# Patient Record
Sex: MALE | Race: WHITE | NOT HISPANIC OR LATINO | Employment: OTHER | URBAN - METROPOLITAN AREA
[De-identification: names, ages, dates, MRNs, and addresses within clinical notes are randomized per-mention and may not be internally consistent; named-entity substitution may affect disease eponyms.]

---

## 2023-08-23 PROBLEM — N50.89 TESTICULAR MASS: Status: ACTIVE | Noted: 2023-08-23

## 2023-08-23 PROBLEM — D48.5 NEOPLASM OF UNCERTAIN BEHAVIOR OF SKIN: Status: ACTIVE | Noted: 2017-01-25

## 2023-08-23 PROBLEM — G47.9 SLEEP DISTURBANCES: Status: ACTIVE | Noted: 2023-08-23

## 2023-08-23 PROBLEM — N52.9 IMPOTENCE DUE TO ERECTILE DYSFUNCTION: Status: ACTIVE | Noted: 2023-08-23

## 2023-08-23 PROBLEM — K80.10 CHRONIC CHOLECYSTITIS WITH CALCULUS: Status: ACTIVE | Noted: 2023-08-23

## 2023-08-23 PROBLEM — K51.90 ULCERATIVE COLITIS (MULTI): Status: ACTIVE | Noted: 2023-08-23

## 2023-08-23 PROBLEM — L74.511 PRIMARY FOCAL HYPERHIDROSIS, FACE: Status: ACTIVE | Noted: 2017-01-25

## 2023-08-23 PROBLEM — K21.9 GERD (GASTROESOPHAGEAL REFLUX DISEASE): Status: ACTIVE | Noted: 2023-08-23

## 2023-08-23 PROBLEM — G62.9 NEUROPATHY: Status: ACTIVE | Noted: 2023-08-23

## 2023-08-23 PROBLEM — Z96.652 STATUS POST TOTAL LEFT KNEE REPLACEMENT: Status: ACTIVE | Noted: 2023-08-23

## 2023-08-23 PROBLEM — N40.0 BPH WITH ELEVATED PSA: Status: ACTIVE | Noted: 2023-08-23

## 2023-08-23 PROBLEM — M12.812 ROTATOR CUFF ARTHROPATHY OF LEFT SHOULDER: Status: ACTIVE | Noted: 2023-08-23

## 2023-08-23 PROBLEM — N39.41 URGE INCONTINENCE OF URINE: Status: ACTIVE | Noted: 2023-08-23

## 2023-08-23 PROBLEM — G47.00 INSOMNIA: Status: ACTIVE | Noted: 2023-08-23

## 2023-08-23 PROBLEM — E78.00 ELEVATED LDL CHOLESTEROL LEVEL: Status: ACTIVE | Noted: 2023-08-23

## 2023-08-23 PROBLEM — M71.22 BAKER'S CYST, LEFT: Status: ACTIVE | Noted: 2023-08-23

## 2023-08-23 PROBLEM — G25.5 CHOREA: Status: ACTIVE | Noted: 2023-08-23

## 2023-08-23 PROBLEM — F41.8 MIXED ANXIETY DEPRESSIVE DISORDER: Status: ACTIVE | Noted: 2023-08-23

## 2023-08-23 PROBLEM — M96.1 POSTLAMINECTOMY SYNDROME, LUMBAR: Status: ACTIVE | Noted: 2023-08-23

## 2023-08-23 PROBLEM — F41.9 ANXIETY: Status: ACTIVE | Noted: 2023-08-23

## 2023-08-23 PROBLEM — E78.2 MIXED HYPERLIPIDEMIA: Status: ACTIVE | Noted: 2023-08-23

## 2023-08-23 PROBLEM — E03.9 HYPOTHYROIDISM: Status: ACTIVE | Noted: 2023-08-23

## 2023-08-23 PROBLEM — M17.9 KNEE OSTEOARTHRITIS: Status: ACTIVE | Noted: 2023-08-23

## 2023-08-23 PROBLEM — G25.9 MOVEMENT DISORDER: Status: ACTIVE | Noted: 2023-08-23

## 2023-08-23 PROBLEM — R97.20 BPH WITH ELEVATED PSA: Status: ACTIVE | Noted: 2023-08-23

## 2023-08-23 PROBLEM — E55.9 VITAMIN D DEFICIENCY: Status: ACTIVE | Noted: 2023-08-23

## 2023-08-23 PROBLEM — F17.210 LIGHT CIGARETTE SMOKER: Status: ACTIVE | Noted: 2023-08-23

## 2023-08-23 PROBLEM — M65.331 TRIGGER MIDDLE FINGER OF RIGHT HAND: Status: ACTIVE | Noted: 2023-08-23

## 2023-08-23 PROBLEM — M79.7 FIBROMYALGIA: Status: ACTIVE | Noted: 2023-08-23

## 2023-08-23 PROBLEM — M81.0 OSTEOPOROSIS: Status: ACTIVE | Noted: 2023-08-23

## 2023-08-23 PROBLEM — E78.00 PURE HYPERCHOLESTEROLEMIA: Status: ACTIVE | Noted: 2023-08-23

## 2023-08-23 PROBLEM — N40.0 ENLARGED PROSTATE: Status: ACTIVE | Noted: 2023-08-23

## 2023-08-23 PROBLEM — L73.8 OTHER SPECIFIED FOLLICULAR DISORDERS: Status: ACTIVE | Noted: 2017-01-25

## 2023-08-23 PROBLEM — R31.9 HEMATURIA: Status: ACTIVE | Noted: 2023-08-23

## 2023-08-23 PROBLEM — R91.1 LUNG NODULE: Status: ACTIVE | Noted: 2023-08-23

## 2023-08-23 RX ORDER — ATORVASTATIN CALCIUM 40 MG/1
1 TABLET, FILM COATED ORAL DAILY
COMMUNITY
Start: 2018-03-28 | End: 2023-10-25 | Stop reason: ALTCHOICE

## 2023-08-23 RX ORDER — CEPHALEXIN 500 MG/1
1 TABLET ORAL 3 TIMES DAILY
COMMUNITY
Start: 2020-11-27 | End: 2023-10-25 | Stop reason: ALTCHOICE

## 2023-08-23 RX ORDER — ATORVASTATIN CALCIUM 20 MG/1
1 TABLET, FILM COATED ORAL DAILY
COMMUNITY
End: 2023-10-25 | Stop reason: SDUPTHER

## 2023-08-23 RX ORDER — IBUPROFEN 800 MG/1
TABLET ORAL
COMMUNITY
End: 2023-10-25 | Stop reason: ALTCHOICE

## 2023-08-23 RX ORDER — DOXYCYCLINE 100 MG/1
1 CAPSULE ORAL 2 TIMES DAILY
COMMUNITY
Start: 2020-11-27 | End: 2023-10-25 | Stop reason: ALTCHOICE

## 2023-08-23 RX ORDER — MUPIROCIN 20 MG/G
1 OINTMENT TOPICAL
COMMUNITY
Start: 2017-01-25 | End: 2023-10-25 | Stop reason: ALTCHOICE

## 2023-08-23 RX ORDER — TOPIRAMATE 100 MG/1
1 TABLET, COATED ORAL 2 TIMES DAILY
COMMUNITY
Start: 2020-10-07 | End: 2023-10-25 | Stop reason: SDUPTHER

## 2023-08-23 RX ORDER — NALOXONE HYDROCHLORIDE 4 MG/.1ML
SPRAY NASAL
COMMUNITY
Start: 2020-09-16 | End: 2023-10-25 | Stop reason: ALTCHOICE

## 2023-08-23 RX ORDER — GABAPENTIN 400 MG/1
CAPSULE ORAL 4 TIMES DAILY
COMMUNITY
Start: 2020-11-09 | End: 2023-10-25 | Stop reason: SDUPTHER

## 2023-08-23 RX ORDER — ESCITALOPRAM OXALATE 5 MG/1
1 TABLET ORAL DAILY
COMMUNITY
Start: 2020-09-17 | End: 2023-10-25 | Stop reason: SDUPTHER

## 2023-08-23 RX ORDER — OXYCODONE HYDROCHLORIDE 5 MG/1
TABLET ORAL
COMMUNITY
Start: 2020-11-27 | End: 2023-10-25 | Stop reason: ALTCHOICE

## 2023-08-23 RX ORDER — OMEPRAZOLE 40 MG/1
1 CAPSULE, DELAYED RELEASE ORAL DAILY
COMMUNITY
Start: 2021-05-17 | End: 2023-10-25 | Stop reason: ALTCHOICE

## 2023-08-23 RX ORDER — TRAZODONE HYDROCHLORIDE 150 MG/1
1 TABLET ORAL NIGHTLY PRN
COMMUNITY
Start: 2016-07-08 | End: 2023-10-25 | Stop reason: SDUPTHER

## 2023-08-23 RX ORDER — HYDROMORPHONE HYDROCHLORIDE 4 MG/1
TABLET ORAL 4 TIMES DAILY PRN
COMMUNITY
Start: 2020-09-16 | End: 2023-10-25 | Stop reason: ALTCHOICE

## 2023-08-23 RX ORDER — ESCITALOPRAM OXALATE 10 MG/1
1 TABLET ORAL DAILY
COMMUNITY
Start: 2020-10-20 | End: 2023-10-25 | Stop reason: ALTCHOICE

## 2023-10-16 ENCOUNTER — APPOINTMENT (OUTPATIENT)
Dept: PRIMARY CARE | Facility: CLINIC | Age: 68
End: 2023-10-16
Payer: MEDICARE

## 2023-10-25 ENCOUNTER — OFFICE VISIT (OUTPATIENT)
Dept: PRIMARY CARE | Facility: CLINIC | Age: 68
End: 2023-10-25
Payer: MEDICARE

## 2023-10-25 VITALS
HEART RATE: 86 BPM | BODY MASS INDEX: 20.73 KG/M2 | WEIGHT: 140 LBS | HEIGHT: 69 IN | TEMPERATURE: 97.6 F | SYSTOLIC BLOOD PRESSURE: 108 MMHG | DIASTOLIC BLOOD PRESSURE: 60 MMHG | OXYGEN SATURATION: 95 %

## 2023-10-25 DIAGNOSIS — Z23 NEED FOR VACCINATION FOR H FLU TYPE B: ICD-10-CM

## 2023-10-25 DIAGNOSIS — G62.9 NEUROPATHY: ICD-10-CM

## 2023-10-25 DIAGNOSIS — Z01.89 ENCOUNTER FOR ROUTINE LABORATORY TESTING: ICD-10-CM

## 2023-10-25 DIAGNOSIS — Z12.5 ENCOUNTER FOR PROSTATE CANCER SCREENING: ICD-10-CM

## 2023-10-25 DIAGNOSIS — F41.8 MIXED ANXIETY DEPRESSIVE DISORDER: ICD-10-CM

## 2023-10-25 DIAGNOSIS — G47.9 SLEEP DISTURBANCES: ICD-10-CM

## 2023-10-25 DIAGNOSIS — G25.5 CHOREA: ICD-10-CM

## 2023-10-25 DIAGNOSIS — E78.2 MIXED HYPERLIPIDEMIA: Primary | ICD-10-CM

## 2023-10-25 DIAGNOSIS — R53.83 OTHER FATIGUE: ICD-10-CM

## 2023-10-25 DIAGNOSIS — M96.1 POSTLAMINECTOMY SYNDROME, LUMBAR: ICD-10-CM

## 2023-10-25 PROBLEM — E78.00 ELEVATED LDL CHOLESTEROL LEVEL: Status: RESOLVED | Noted: 2023-08-23 | Resolved: 2023-10-25

## 2023-10-25 PROCEDURE — 1159F MED LIST DOCD IN RCRD: CPT | Performed by: INTERNAL MEDICINE

## 2023-10-25 PROCEDURE — 99214 OFFICE O/P EST MOD 30 MIN: CPT | Performed by: INTERNAL MEDICINE

## 2023-10-25 PROCEDURE — G0008 ADMIN INFLUENZA VIRUS VAC: HCPCS | Performed by: INTERNAL MEDICINE

## 2023-10-25 PROCEDURE — 90662 IIV NO PRSV INCREASED AG IM: CPT | Performed by: INTERNAL MEDICINE

## 2023-10-25 PROCEDURE — 1125F AMNT PAIN NOTED PAIN PRSNT: CPT | Performed by: INTERNAL MEDICINE

## 2023-10-25 RX ORDER — ATORVASTATIN CALCIUM 20 MG/1
20 TABLET, FILM COATED ORAL DAILY
Qty: 90 TABLET | Refills: 1 | Status: SHIPPED | OUTPATIENT
Start: 2023-10-25 | End: 2024-04-24 | Stop reason: SDUPTHER

## 2023-10-25 RX ORDER — ESCITALOPRAM OXALATE 5 MG/1
5 TABLET ORAL DAILY
Qty: 90 TABLET | Refills: 1 | Status: SHIPPED | OUTPATIENT
Start: 2023-10-25 | End: 2024-04-24 | Stop reason: ALTCHOICE

## 2023-10-25 RX ORDER — TOPIRAMATE 100 MG/1
100 TABLET, COATED ORAL 2 TIMES DAILY
Qty: 90 TABLET | Refills: 1 | Status: SHIPPED | OUTPATIENT
Start: 2023-10-25 | End: 2023-12-06

## 2023-10-25 RX ORDER — GABAPENTIN 400 MG/1
400 CAPSULE ORAL 4 TIMES DAILY
Qty: 360 CAPSULE | Refills: 1 | Status: SHIPPED | OUTPATIENT
Start: 2023-10-25 | End: 2023-11-09 | Stop reason: SDUPTHER

## 2023-10-25 RX ORDER — TRAZODONE HYDROCHLORIDE 150 MG/1
150 TABLET ORAL NIGHTLY PRN
Qty: 90 TABLET | Refills: 1 | Status: SHIPPED | OUTPATIENT
Start: 2023-10-25 | End: 2024-03-22

## 2023-10-25 ASSESSMENT — ENCOUNTER SYMPTOMS
DEPRESSION: 0
OCCASIONAL FEELINGS OF UNSTEADINESS: 1
LOSS OF SENSATION IN FEET: 0

## 2023-10-25 ASSESSMENT — PATIENT HEALTH QUESTIONNAIRE - PHQ9
SUM OF ALL RESPONSES TO PHQ9 QUESTIONS 1 AND 2: 0
2. FEELING DOWN, DEPRESSED OR HOPELESS: NOT AT ALL
1. LITTLE INTEREST OR PLEASURE IN DOING THINGS: NOT AT ALL

## 2023-10-25 ASSESSMENT — PAIN SCALES - GENERAL: PAINLEVEL: 4

## 2023-10-25 NOTE — PROGRESS NOTES
[facility]: [facility]  PROGRESS NOTE      Davi Garsia is a 68 y.o. male that is presenting today for Follow-up.    Assessment/Plan   Diagnoses and all orders for this visit:  Mixed hyperlipidemia    Per most recent BW well controlled  Continue current medication  Rx Escripted   -     atorvastatin (Lipitor) 20 mg tablet; Take 1 tablet (20 mg) by mouth once daily. 90 x 1  -     TSH with reflex to Free T4 if abnormal; Future  -     Lipid Panel; Future  Neuropathy       Under control with current treatment   Continue the same   Rx E-scripted  -     gabapentin (Neurontin) 400 mg capsule; Take 1 capsule (400 mg) by mouth 4 times a day. 90 d x 1  Mixed anxiety depressive disorder     Under control with current treatment   Continue the same   Rx E-scripted  -     escitalopram (Lexapro) 5 mg tablet; Take 1 tablet (5 mg) by mouth once daily. 90 D x 1  -     Topamax 100 mg tablet; Take 1 tablet (100 mg) by mouth 2 times a day.  90 D x 1  Encounter for routine laboratory testing  -     Comprehensive Metabolic Panel; Future  -     CBC; Future  -     Prostate Specific Antigen; Future  -     Lipid Panel; Future  Encounter for prostate cancer screening  -     Prostate Specific Antigen; Future  Sleep disturbances  -     traZODone (Desyrel) 150 mg tablet; Take 1 tablet (150 mg) by mouth as needed at bedtime for sleep.  Other fatigue  -     CBC; Future  Need for vaccination for H flu type B  -     Flu vaccine, quadrivalent, high-dose, preservative free, age 65y+ (FLUZONE)  Chorea        Stable   -     Disability Placard 5 yrs  Postlaminectomy syndrome, lumbar  -     Disability Placard 5 yrs  Subjective   Patient is here for his 6 months FUV been doing fairly well and has no concerns at this time    Review of Systems   All pertinent POSITIVES as noted per HPI.  All other systems have been reviewed and are NEGATIVE and /or Noncontributory to this patient current visit or complaint.   Objective   Vitals:     10/25/23 0931   BP: 108/60   Pulse: 86   Temp: 36.4 °C (97.6 °F)   SpO2: 95%      Body mass index is 20.98 kg/m².  Physical Exam  Vitals and nursing note reviewed.   Constitutional:       Appearance: Normal appearance.   HENT:      Head: Normocephalic and atraumatic.   Neck:      Vascular: No carotid bruit.   Cardiovascular:      Rate and Rhythm: Normal rate and regular rhythm.      Pulses: Normal pulses.      Heart sounds: Normal heart sounds.   Pulmonary:      Effort: Pulmonary effort is normal.      Breath sounds: Normal breath sounds.   Abdominal:      General: Abdomen is flat. Bowel sounds are normal.      Palpations: Abdomen is soft.   Musculoskeletal:         General: No swelling. Normal range of motion.      Cervical back: Neck supple.   Lymphadenopathy:      Cervical: No cervical adenopathy.   Skin:     General: Skin is warm and dry.   Neurological:      Mental Status: He is alert.      Comments: Chorea with involuntary movement   Psychiatric:         Mood and Affect: Mood normal.         Behavior: Behavior normal.       Diagnostic Results   Lab Results   Component Value Date    GLUCOSE 101 (H) 06/26/2023    CALCIUM 9.1 06/26/2023     06/26/2023    K 3.8 06/26/2023    CO2 27 06/26/2023     06/26/2023    BUN 13 06/26/2023    CREATININE 0.9 06/26/2023     Lab Results   Component Value Date    ALT 18 06/26/2023    AST 17 06/26/2023    ALKPHOS 79 06/26/2023    BILITOT 0.2 06/26/2023     Lab Results   Component Value Date    WBC 7.9 06/26/2023    HGB 14.6 06/26/2023    HCT 43.6 06/26/2023    MCV 94.6 06/26/2023     06/26/2023     Lab Results   Component Value Date    CHOL 159 06/26/2023    CHOL 144 08/22/2022    CHOL 163 01/14/2021     Lab Results   Component Value Date    HDL 66 06/26/2023    HDL 57 08/22/2022    HDL 41 01/14/2021     Lab Results   Component Value Date    LDLCALC 77 06/26/2023    LDLCALC 76 08/22/2022    LDLCALC 108 01/14/2021     Lab Results   Component Value Date    TRIG  "80 06/26/2023    TRIG 57 08/22/2022    TRIG 70 01/14/2021     No components found for: \"CHOLHDL\"  Lab Results   Component Value Date    HGBA1C 5.1 06/26/2023     Other labs not included in the list above were reviewed either before or during this encounter.    History    Past Medical History:   Diagnosis Date    Depression     Fibromyalgia     GERD (gastroesophageal reflux disease)     Insomnia     Osteoporosis     Other conditions influencing health status     Arthritis    Personal history of other infectious and parasitic diseases     History of mumps     Past Surgical History:   Procedure Laterality Date    BACK SURGERY      COLONOSCOPY W/ BIOPSIES      PENILE PROSTHESIS IMPLANT      SHOULDER      TOTAL KNEE ARTHROPLASTY       Family History   Problem Relation Name Age of Onset    Dementia Mother      Mental illness Mother      Heart attack Father      Diabetes Father      Other (brain tumor) Father      Heart disease Father      Cancer Maternal Grandfather      Leukemia Other      Pancreatic cancer Other       Social History     Socioeconomic History    Marital status:      Spouse name: Not on file    Number of children: Not on file    Years of education: Not on file    Highest education level: Not on file   Occupational History    Not on file   Tobacco Use    Smoking status: Every Day     Packs/day: .5     Types: Cigarettes     Passive exposure: Current    Smokeless tobacco: Never   Vaping Use    Vaping Use: Never used   Substance and Sexual Activity    Alcohol use: Never    Drug use: Never    Sexual activity: Not on file   Other Topics Concern    Not on file   Social History Narrative    Not on file     Social Determinants of Health     Financial Resource Strain: Not on file   Food Insecurity: Not on file   Transportation Needs: Not on file   Physical Activity: Not on file   Stress: Not on file   Social Connections: Not on file   Intimate Partner Violence: Not on file   Housing Stability: Not on file "     Allergies   Allergen Reactions    Adhesive Tape-Silicones Unknown     Reactions: Blistering    Cephalexin Unknown    Clindamycin Unknown    Linezolid Unknown    Adhesive Rash     water blisters    Penicillins Rash and Swelling     chest swelling     Current Outpatient Medications on File Prior to Visit   Medication Sig Dispense Refill    ketamine (Ketalar) 4 mg/mL swish & spit solution Swish and spit 1 time. Swish and swallow prn      [DISCONTINUED] atorvastatin (Lipitor) 20 mg tablet Take 1 tablet (20 mg) by mouth once daily.      [DISCONTINUED] escitalopram (Lexapro) 5 mg tablet Take 1 tablet (5 mg) by mouth once daily.      [DISCONTINUED] gabapentin (Neurontin) 400 mg capsule Take by mouth 4 times a day.      [DISCONTINUED] Topamax 100 mg tablet Take 1 tablet (100 mg) by mouth 2 times a day.      [DISCONTINUED] traZODone (Desyrel) 150 mg tablet Take 1 tablet (150 mg) by mouth as needed at bedtime.      [DISCONTINUED] atorvastatin (Lipitor) 40 mg tablet Take 1 tablet (40 mg) by mouth once daily.      [DISCONTINUED] cephalexin (Keftab) 500 mg tablet Take 1 tablet (500 mg) by mouth 3 times a day.      [DISCONTINUED] diphenhydramine HCl (BENADRYL ORAL) Benadryl      [DISCONTINUED] doxycycline (Vibramycin) 100 mg capsule Take 1 capsule (100 mg) by mouth 2 times a day.      [DISCONTINUED] escitalopram (Lexapro) 10 mg tablet Take 1 tablet (10 mg) by mouth once daily.      [DISCONTINUED] HYDROmorphone (Dilaudid) 4 mg tablet Take by mouth 4 times a day as needed.      [DISCONTINUED] ibuprofen 800 mg tablet Take by mouth. 3 to 4 times a day      [DISCONTINUED] morphine sulfate (MORPHINE ORAL) as directed as directed via IT pump, managed by pain management      [DISCONTINUED] mupirocin (Bactroban) 2 % ointment 1 Application.      [DISCONTINUED] naloxone (Narcan) 4 mg/0.1 mL nasal spray Administer into affected nostril(s). INHALE 0.1ML INTO NOSTRIL FOR ACCIDENTAL OVERDOSE      [DISCONTINUED] omeprazole (PriLOSEC) 40 mg  DR capsule Take 1 capsule (40 mg) by mouth once daily. 30 minutes before morning meal      [DISCONTINUED] oxyCODONE (Roxicodone) 5 mg immediate release tablet Take by mouth.  TAKE 1-2 TABS BY MOUTH EVERY 6 HOURS AS NEEDED FOR PAIN       No current facility-administered medications on file prior to visit.     Immunization History   Administered Date(s) Administered    Flu vaccine, quadrivalent, high-dose, preservative free, age 65y+ (FLUZONE) 12/17/2020, 10/04/2021, 10/25/2022, 10/25/2023    Influenza, High Dose Seasonal, Preservative Free 10/01/2019, 09/07/2020    Influenza, injectable, quadrivalent 10/07/2016    Influenza, seasonal, injectable 09/29/2012, 10/24/2013, 10/09/2015, 09/19/2018    Novel influenza-H1N1-09, preservative-free 01/04/2010    Pfizer COVID-19 vaccine, bivalent, age 12 years and older (30 mcg/0.3 mL) 10/25/2022    Pfizer Purple Cap SARS-CoV-2 03/24/2021, 04/23/2021, 11/11/2021    Pneumococcal conjugate vaccine, 13-valent (PREVNAR 13) 10/07/2016    Pneumococcal conjugate vaccine, 20-valent (PREVNAR 20) 02/15/2023    Pneumococcal polysaccharide vaccine, 23-valent, age 2 years and older (PNEUMOVAX 23) 03/24/2010, 09/04/2018, 07/17/2020    Td vaccine, age 7 years and older (TENIVAC) 07/23/1996    Tdap vaccine, age 7 year and older (BOOSTRIX) 10/01/2019     Patient's medical history was reviewed and updated either before or during this encounter.    Alcira Wooten MD

## 2023-11-06 ENCOUNTER — TELEPHONE (OUTPATIENT)
Dept: PRIMARY CARE | Facility: CLINIC | Age: 68
End: 2023-11-06
Payer: MEDICARE

## 2023-11-06 DIAGNOSIS — G62.9 NEUROPATHY: ICD-10-CM

## 2023-11-06 NOTE — TELEPHONE ENCOUNTER
Patient states that he has been taking gabapentin 400mg 2 tables 4 times a day and you sent 1 tablet 4 times a day and it won't help if he does not take 2 tablets 4 times a day please advise.

## 2023-11-08 NOTE — TELEPHONE ENCOUNTER
Please review, there is no past Rx in [facility], Epic record matches current 1 tab 4xday, med was sent 10/25 with above orders

## 2023-11-09 RX ORDER — GABAPENTIN 400 MG/1
400 CAPSULE ORAL 4 TIMES DAILY
Qty: 360 CAPSULE | Refills: 0 | Status: SHIPPED | OUTPATIENT
Start: 2023-11-09 | End: 2024-01-29 | Stop reason: SDUPTHER

## 2023-11-22 DIAGNOSIS — F41.8 MIXED ANXIETY DEPRESSIVE DISORDER: ICD-10-CM

## 2023-12-04 NOTE — TELEPHONE ENCOUNTER
"Pt called today asking if can send topiramate 100 mg instead of name brand topamax.  Pt would not pay for brand name because too costly.      Pt indicates the Rx for topamax indicates \"no substitution\" which is what prompted this message from pharm.  Please send topiramate.  "

## 2023-12-06 DIAGNOSIS — G25.9 MOVEMENT DISORDER: ICD-10-CM

## 2023-12-06 DIAGNOSIS — G25.5 CHOREA: Primary | ICD-10-CM

## 2023-12-06 RX ORDER — TOPIRAMATE 100 MG/1
100 TABLET, FILM COATED ORAL 2 TIMES DAILY
Qty: 180 TABLET | Refills: 1 | Status: SHIPPED | OUTPATIENT
Start: 2023-12-06 | End: 2024-12-05

## 2023-12-07 RX ORDER — TOPIRAMATE 100 MG/1
100 TABLET, COATED ORAL 2 TIMES DAILY
Refills: 3 | OUTPATIENT
Start: 2023-12-07

## 2023-12-11 DIAGNOSIS — G25.5 CHOREA: ICD-10-CM

## 2023-12-11 DIAGNOSIS — G25.9 MOVEMENT DISORDER: ICD-10-CM

## 2023-12-11 NOTE — TELEPHONE ENCOUNTER
[facility] states they never received the Rx for topiramate 100 mg even though receipt was confirmed by pharm.  Asking if you could resend to [facility] so they can ship out immediately.

## 2023-12-14 RX ORDER — TOPIRAMATE 100 MG/1
100 TABLET, FILM COATED ORAL 2 TIMES DAILY
Qty: 180 TABLET | Refills: 1 | OUTPATIENT
Start: 2023-12-14 | End: 2024-12-13

## 2024-01-26 ENCOUNTER — TELEPHONE (OUTPATIENT)
Dept: PRIMARY CARE | Facility: CLINIC | Age: 69
End: 2024-01-26
Payer: MEDICARE

## 2024-01-28 DIAGNOSIS — G62.9 NEUROPATHY: ICD-10-CM

## 2024-01-28 DIAGNOSIS — G47.9 SLEEP DISTURBANCES: ICD-10-CM

## 2024-01-29 RX ORDER — GABAPENTIN 400 MG/1
800 CAPSULE ORAL 4 TIMES DAILY
Qty: 360 CAPSULE | Refills: 0
Start: 2024-01-29 | End: 2024-03-11

## 2024-02-09 ENCOUNTER — OFFICE VISIT (OUTPATIENT)
Dept: BEHAVIORAL HEALTH | Facility: CLINIC | Age: 69
End: 2024-02-09
Payer: MEDICARE

## 2024-02-09 ENCOUNTER — OFFICE VISIT (OUTPATIENT)
Dept: NEUROLOGY | Facility: CLINIC | Age: 69
End: 2024-02-09
Payer: MEDICARE

## 2024-02-09 ENCOUNTER — OFFICE VISIT (OUTPATIENT)
Dept: GENETICS | Facility: CLINIC | Age: 69
End: 2024-02-09
Payer: MEDICARE

## 2024-02-09 VITALS — WEIGHT: 129 LBS | RESPIRATION RATE: 16 BRPM | HEIGHT: 68 IN | BODY MASS INDEX: 19.55 KG/M2

## 2024-02-09 DIAGNOSIS — G25.5 CHOREA: Primary | ICD-10-CM

## 2024-02-09 DIAGNOSIS — G25.5 CHOREA: ICD-10-CM

## 2024-02-09 PROCEDURE — 1125F AMNT PAIN NOTED PAIN PRSNT: CPT | Performed by: PSYCHIATRY & NEUROLOGY

## 2024-02-09 PROCEDURE — 99205 OFFICE O/P NEW HI 60 MIN: CPT | Performed by: PSYCHIATRY & NEUROLOGY

## 2024-02-09 PROCEDURE — 99204 OFFICE O/P NEW MOD 45 MIN: CPT | Performed by: PSYCHIATRY & NEUROLOGY

## 2024-02-09 PROCEDURE — 99204 OFFICE O/P NEW MOD 45 MIN: CPT | Performed by: MEDICAL GENETICS

## 2024-02-09 PROCEDURE — 1160F RVW MEDS BY RX/DR IN RCRD: CPT | Performed by: PSYCHIATRY & NEUROLOGY

## 2024-02-09 PROCEDURE — 1159F MED LIST DOCD IN RCRD: CPT | Performed by: PSYCHIATRY & NEUROLOGY

## 2024-02-09 PROCEDURE — 1159F MED LIST DOCD IN RCRD: CPT | Performed by: MEDICAL GENETICS

## 2024-02-09 PROCEDURE — 1160F RVW MEDS BY RX/DR IN RCRD: CPT | Performed by: MEDICAL GENETICS

## 2024-02-09 PROCEDURE — 1125F AMNT PAIN NOTED PAIN PRSNT: CPT | Performed by: MEDICAL GENETICS

## 2024-02-09 ASSESSMENT — ENCOUNTER SYMPTOMS
DEPRESSION: 0
OCCASIONAL FEELINGS OF UNSTEADINESS: 1
LOSS OF SENSATION IN FEET: 0

## 2024-02-09 ASSESSMENT — PATIENT HEALTH QUESTIONNAIRE - PHQ9
1. LITTLE INTEREST OR PLEASURE IN DOING THINGS: NOT AT ALL
SUM OF ALL RESPONSES TO PHQ9 QUESTIONS 1 AND 2: 1
2. FEELING DOWN, DEPRESSED OR HOPELESS: SEVERAL DAYS

## 2024-02-09 ASSESSMENT — PAIN SCALES - GENERAL: PAINLEVEL: 6

## 2024-02-09 NOTE — PROGRESS NOTES
"Subjective   Patient ID: Davi Garsia is a 68 y.o. male who presents for a new patient visit.    Accompanied by wife.    HPI  Mr. 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 He is here today in the [place] disease multidisciplinary clinic for genetic evaluation, and also seeing psychiatry and movement disorders neurology.  He is self-referred, although referral was recommended about 5 years ago by Dr. Francisco Coles.     Tongue movement began 3-4 years ago, patient recalls [likely somewhat earlier according to note by Dr. Coles]. Other movement began last year and have been getting worse. He did have a prescription for Ingrezza, but not currently taking medication for this.     He has memory issues. He reports a deficit in short-term memory. If wife tells him something, he will forget in a day or two. If his wife asks him to bring something on his way back to the room, he will forget. If she reminds him that she asked, he will then recall her asking. He has good long-term memory.     He is able to go to the store and shop. He believes he functions well as far as planning. His concentration ability depends on what he is concentrating on. He does not multi-task, per wife (not necessarily a new issue).     Sometimes has problems with swallowing. He has not had a swallow test, but declines one.     He is concerned with his weight loss. He has lost significant weight recently. He believes he lost 3 lbs in 24 hrs.     He has an enlarged prostate.     Dayron has multiple orthopedic issues.  Going to have a shoulder replacement on March 29th.  He has had a knee replacement. Back and lower extremity pain.  Has had two spinal cord stimulator infections. Believes that his orthopedic issues are due to Ciprofloxacin side effects. He was also on steroids for a long time, per patient.     Per neurology and psychiatry, he reports an episode of unintentional opioid " overdose, about 25 years ago, leading to unresponsiveness.  The chorea did not start acutely after that, however.    PMHx:  lumbar postlaminectomy syndrome (status post pump implant 11/27/2020)    Per Dr. Manning's note on 9/6/2020, “Patient experienced and episode of what was described as spasms in the pelvis. Patient blood pressure was elevated at the time. 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  His R hand trigger finger. Has dull constant pain which is mild. Some weakness. Never had a stroke, had a couple of TIAs.”    Per Dr. Coles's note on 4/4/2019:  “A year back he developed involuntary lip smacking and tongue darting movements, he did not seem to be bothered by these movements.This progressively got worse .This was followed by generalized restlessness ,he couldn't sit still had his legs always moving.After almost for last 6-7 months he developed twitching of the face followed by head movements and upper body and arms irregular movements .More recently has developed grunting,groaning sounds.  Short term memory is getting very poor.  No frequent falls.  Gait is affected since surgery of knees in Nov 2018.  He has mild dysphagia.  He has started talking to himself for last couple of weeks per wife but denies hallucinations.  He has been walking with support for last 6 months uses cane ,as a security blanket but it is not very helpful as he often forgets to take it .  He forgets names,forgets why he was in the kitchen,forgets conversation.  No hx of use of dopamine blockers”      PSHx:  Per Dr. Coles's note on 4/4/2019:  “Leftknee surgeries on NOV 28 th,2018.  laminectomy L5,S1. post morphine pump  Had at one point spinal stimulator but had to be removed because of infections.”      Family  History:  No family history of [place] disease or other movement disorders.  Per Dr. Coles's note on 2019 and my additions in brackets:  “Parents  at old age. mother around mid 80s and father around mid [upper] 70s.None of them had any abnormal involuntary movements. [Father did have an MI, but  from a tumor with hydrocephalus, was in a coma for 3-4 months.]  Seven siblings none of them have any any abnormal involuntary movements.  A brother had a possible aneurysmal bleed(had headache and afterwards was crippled per patient).”    Brother (oldest):  in 70s, MI, diabetes.  Brother (youngest):  in 60s, MI, diabetes.  Son (50 yr): No children.   Grandparents: Lived to old age    Social History:    Per Dr. Coles's note on 2019, “Life long smokers since age 16 years smokes 8 cigerrettes /day”    Specialist Evaluations:  Primary Care - Alcira Wooten MD; 10/25/2023:  “Neuropathy: Under control with current treatment    Comments: Chorea with involuntary movement”     Pain Medicine - Darian Wing MD PhD; 12/10/2020:  “65-year-old man with history of lumbar postlaminectomy syndrome and status post infected intrathecal pump that was replaced 3 weeks ago presenting for virtual follow-up. The wounds appear to be healing appropriately without signs of infection. There may be slight dehiscence at the epidermal layer based on visualization remotely through the phone camera on today's virtual visit.”    Neurology - Francisco Coles MD; 2019:  “Davi Garsia 63 year old RH life long male is here for initial evaluation of involuntary movements.He had L5S1 Laminectomy s/p pain pump.On exam he has generalized chorea,dystonia,mild Parkinsonism,unsurpassable head movements,with hypometric saccades and choppy pursuit movements.MOCA score 22/30.  Based on above the two main differential diagnosis is paraneoplastic chorea vs Huntingtons Disease.  We ve ordered thyroid functions tests and  "antibodies;AWILDA;CMP,Copper,Ceruloplasmin and CBC with acanthocytes and Encephalopathy Paraneoplastic panel.We ve discussed with him that the next step would be the Neurogeneticist consult.We discussed the various FDA approved treatment options for chorea and handed him the information leaflets for tetrabenazine,Austedo and Ingrezza. RTC in 4 weeks.”    Imaging:    \"MRI BRAIN W/WO CONTRAST; 4/11/2019 10:25 am     IMPRESSION:  There is moderate brain parenchymal volume loss.     There are nonspecific white matter changes within the cerebral  hemispheres bilaterally as well as ill-defined foci of increased  signal on the FLAIR and T2 weighted images within the brainstem which  while nonspecific, given the patient's age, likely represent sequelae  of more remote small vessel ischemic change. Additional small foci of  bright signal on the T2 weighted images are noted within the  subinsular regions, basal ganglia, and thalami bilaterally suggesting  incidental mildly prominent perivascular spaces and/or small  scattered more remote lacunar infarctions.\"         Assessment/Plan     It was a pleasure to meet you today.     We reviewed some of the following information about [place] disease today.    Benny disease (HD) is an inherited progressive neurologic condition that affects the brain. HD causes a variety of symptoms: movement, cognitive, and psychiatric. People with HD develop uncontrolled movements (chorea/movement), loss of thinking ability (cognition), and emotional problems such as depression (psychiatric).    HD most commonly affects adults. Symptoms of HD typically develop in a person's 30s or 40s. Early signs and symptoms can include irritability, depression, small involuntary movements, poor coordination, and trouble learning new information or making decisions. Many people with [place] disease develop involuntary jerking or twitching movements known as chorea. As the disease progresses, these " movements become more pronounced. Affected individuals may have trouble walking, speaking, and swallowing. People with this disorder also experience changes in personality and a decline in thinking and reasoning abilities. Individuals with the adult-onset form of Benny disease usually live about 15 to 20 years after signs and symptoms begin.    We reviewed the inheritance of HD. We have around 20,000 or so genes. Our genes come in pairs (we get one copy of each gene from each of our biological parents). HD is an inherited genetic condition. This means it is caused by a faulty or mutated gene. These mutations are usually inherited from a parent. If a parent has HD, there is a 50% chance that that person's children will also inherit the gene mutation that causes HD.    The genetic change that leads to HD is called an expansion.  At the start of the HD gene, there is a repeated sequence of the DNA bases CAG. They are arranged like this:    CAG--CAG--CAG--CAG--CAG--CAG--CAG--CAG--and so on    Most people have 26 or fewer CAG repeats in both copies of their HD gene. However, in people with HD, there are usually greater than 40 CAG repeats, and sometimes up to 100 of these repeats. The increase in the number of the CAG repeats interferes with the way the HD gene is supposed to work, and eventually leads to HD.    As noted above, when someone who carries the HD mutation has children, each child will have a 50% chance to inherit either the normal copy of the HD gene (with 26 or fewer repeats) OR the HD gene with the expansion. The child will inherit a second copy of the normal HD from the other parent (assuming the other parent does not have HD). This is called autosomal dominant inheritance.  Each child who inherits an HD gene with an expansion will develop HD at some point in his or her life.    If a person inherits an expansion (HD mutation) from their father, the number of repeats may be larger than seen in their  father.  Expansions inherited from a person's mother are usually similar in size to that seen in the mother.  A larger number of repeats usually means an earlier average age of onset.  We cannot predict age of onset reliably just from a person's genetic test report.    Currently, we do not have a cure for HD. Some of symptoms of HD can be managed by medication (lessening the uncontrolled movements, treating depression). A person with HD is often followed by a neurologist and a psychiatrist. There are researchers around the world actively studying HD and working towards treatments for HD.    For information about research opportunities, please see the HDSA website, www.HDSA.org, https://hdsa.org/hd-research/hd-trial-finder/ (or search for HDSA Trial Finder), www.Enroll-HD.org (a natural history study), and www.clinicaltrials.gov.    The [facility] Benny Disease Society of [place] (http://[place].hdsa.org/) and their , Raisa Monet, are a great resource for families that have loved ones with [place] disease. Raisa is the local [facility] chapter  and can be reached at [phone] or [email or web address].     The [facility] offers support groups as well as information about the fast-track Disability process.    It is unclear whether or not you have HD.  On one hand, you have pronounced chorea, which is suggestive.  On the other hand, you have no family history and your caudate (a part of the brain that is often small in people with HD) was not particularly small in 2019, although that was nearly 5 years ago.  It is possible to be the first person in the family with HD, so testing is strongly recommended to rule in or rule out HD.    If your testing is negative, I will discuss with Dr. Roldan whether additional genetic testing for neurodegenerative conditions is recommended for you.    We discussed that HD genetic testing options include self-pay through [employer] for $250, or  sending the sample to [facility] (in-network with your insurance) with or without attempting preauthorization first.  You would like my office to attempt preauthorization.  We will contact your insurance company on Monday to see if preauthorization is possible/necessary for this test.    Plan:  Blood draw planned for Monday at  [facility] if we learn this does not require preauthorization --our office will update you.  Please wait for an update from the genetics office before going to the lab for blood draw.  You noted that it is okay to call your wife (Nirmala) if my office is unable to reach you.  If it does require preauthorization, my office will start this process, but this could take up to several weeks.  We would notify you of the outcome.  If blood is drawn on Monday, we will plan to follow-up in 1 month in the next HD clinic.  Otherwise, we will schedule in-person follow-up for about 3 to 4 weeks after blood is drawn.  Consider future swallow study, particularly if your swallowing worsens.   I will be out of the office from 2/13 through 2/19.      If you have any questions, please call Sharmaine Montero in the [facility] at [phone] option 1 or send me a non-urgent message through [employer].     Nikkie Walter MD  Clinical     Visit Time: 2:55 - 3:42    Documentation Time: 4:29 - 4:32 +3 minutes    Scribe Attestation  By signing my name below, IJustyna , Scribe   attest that this documentation has been prepared under the direction and in the presence of Nikkie Walter MD.

## 2024-02-09 NOTE — PATIENT INSTRUCTIONS
Plan:   - It is okay to proceed with genetic testing from psychiatric standpoint.   - You can continue taking your current medications, including escitalopram and trazodone, as prescribed by your other providers.   - Follow up with me only as needed.   - Contact via [facility] or call ([phone]) in case of any questions or concerns.  - Call [phone] for mental health crisis or suicidal thoughts, or call 911 or go to the nearest emergency room for emergencies.

## 2024-02-09 NOTE — PROGRESS NOTES
Subjective     Davi Garsia is a   68 y.o. 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  Visit type: new patient visit     [facility]    HPI He developed fidgetiness and abnormal movements in his hands and trunk since 5 years ago which is getting worse.He also noticed shaking in his hands.No imbalance and fall.He also complains of short term memory loss.Does not impair his function.He still drives and independent.No VH.No depression or anxiety.he is more irritable for about a year.  He is on trazadone,topiramate and Citaloperam.He has a pain pump.Has a history of opium overdose.He was  never on antipsychotic medication or antiemetic medication.    PMH:He is going to do surgery for Lt shoulder in March 2024 bcz of shoulder tendon rupture.  He had Knee replcement in Lt side 4 years ago  He has ankle Fx when he was 1 YO and his ankle is misshaped.      FH:No FH of neurologic problem.He has 6 siblings,2 passed away form heart attack  He has a 51 YO son is healthy  Social:Fired ceramic in furnaces  Active Smoker  No drug/substance abuse  Does not drink alcohol      Patient Health Questionnaire-2 Score: 1  Brain MRI on 2019: There is moderate brain parenchymal volume loss.     There are nonspecific white matter changes within the cerebral  hemispheres bilaterally as well as ill-defined foci of increased  signal on the FLAIR and T2 weighted images within the brainstem which  while nonspecific, given the patient's age, likely represent sequelae  of more remote small vessel ischemic change. Additional small foci of  bright signal on the T2 weighted images are noted within the  subinsular regions, basal ganglia, and thalami bilaterally suggesting  incidental mildly prominent perivascular spaces and/or small  scattered more remote lacunar infarctions.     The study was interpreted at [facility].      Blood work  on 2019:Copper,Ceruloplasmin,AWILDA.TPO 4 years ago normal       Review of Systems  All other system have been reviewed and are negative for complaint.  Objective   Neurological Exam  Mental Status   Oriented to person, place, time and situation.    Cranial Nerves  CN III, IV, VI: Abnormal extraocular movements: No nystagmus. Hypermetric saccades. Diminished smooth pursuit.  CN V: Facial sensation is normal.  CN VII: Full and symmetric facial movement.  CN VIII: Hearing is normal.  CN IX, X: Palate elevates symmetrically    Motor   Strength is 5/5 in all four extremities except as noted.  Lt upper extremity weakness partly is explained due to Lt tendon rupture in proximal but also distal LUE weakness 4/5 distal,Proximal:2/5    LLE:Proximal:4/5 distal:4/5 partly explained by pain .    Sensory  Light touch abnormality: Pinprick abnormality: Vibration abnormality:   Lt lower extremity decreased sensation in light touch and vibration .    Reflexes                                            Right                      Left  Biceps                                 Tr                         Tr  Patellar                                1+                         1+  Achilles                                1+                         1+  Right Plantar: downgoing  Left Plantar: downgoing    Coordination  Right: Finger-to-nose abnormality:    Gait  Casual gait: Normal pull test.  Choreiform gait.      Physical Exam  Eyes:      Extraocular Movements: EOM normalNo nystagmus.   Neurological:      Deep Tendon Reflexes:      Reflex Scores:       Patellar reflexes are 1+ on the right side and 1+ on the left side.       Achilles reflexes are 1+ on the right side and 1+ on the left side.                     UNIFIED [facility] DISEASE RATING SCALE (UHDRS) MOTOR SECTION:    Ocular Pursuit (horizontal)  0-complete  *1-jerky  2-interrupted/full range  3-incomplete range  4-cannot pursue    Ocular Pursuit  (vertical)  *0-complete  1-jerky  2-interrupted/full range  3-incomplete range  4-cannot pursue    Saccade Initiation (horizontal)  *0-normal  1-increased latency  2-suppressible blinks/head movements to  initiate  3-unsuppressible head movements  4-cannot initiate    Saccade Initiation (vertical)  *0-normal  1-increased latency  2-suppressible blinks/head movements to  initiate  3-unsuppressible head movements  4-cannot initiate    * Saccade Velocity (horizontal)  0-normal  1-mild slowing  2-moderate slowing  3-severely slow, full range  4-incomplete range    * Saccade Velocity (vertical)  *0-normal  1-mild slowing  2-moderate slowing  3-severely slow, full range  4-incomplete range    Dysarthria  0-normal  1-unclear, no need to repeat  2-must repeat  3-mostly incomprehensible  4-mute    Tongue Protrusion  0-normal  1-<10 seconds  2-<5 seconds  *3-cannot fully protrude  4-cannot beyond lips    Finger Taps (right)  0-normal (15/5sec)  1-mild slowing or reduction in amp.  *2-moderately impaired. may have occasional  arrests (7- 10/15sec)  3-severely impaired. Frequent hesitations and  arrests  4-can barely perform    Finger Taps (left)  0-normal (15/5sec)  1-mild slowing or reduction in amp.  2-moderately impaired. may have occasional  arrests (7- 10/15sec)  3-severely impaired. Frequent hesitations and  arrests  4-can barely perform    Pronate/Supinate (right)  0-normal  *1-mild slowing/irregular  2-moderate slowing and irregular  3-severe slowing and irregular  4-cannot perform    Pronate/Supinate (left)  0-normal  1-mild slowing/irregular  2-moderate slowing and irregular  3-severe slowing and irregular  4-cannot perform    Fist-Hand-Palm Sequence  0->4 in 10 seconds without cues  1-<4 in 10 sec. without cues  2->4 in 10 sec. with cues  3-<4 in 10 sec. with cues  *4-cannot perform    Rigidity-arms (right)  *0-absent  1-slight or only with activation  2-mild/moderate  3-severe, full range of motion  4-severe with  limited range    Rigidity-arms (left)  0-absent  1-slight or only with activation  2-mild/moderate  3-severe, full range of motion  4-severe with limited range    Bradykinesia  0-normal  *1-minimally slow  2-mildly but clearly slow  3-moderately slow  4-marked slowing, long delays in initiation    Maximal Dystonia(trunk)  *0-absent  1-slight/intermittent  2-mild/common or moderate/intermittent  3-moderate/common  4-marked/prolonged    Maximal Dystonia(RUE)  *0-absent  1-slight/intermittent  2-mild/common or moderate/intermittent  3-moderate/common  4-marked/prolonged    Maximal Dystonia(LUE)  *0-absent  1-slight/intermittent  2-mild/common or moderate/intermittent  3-moderate/common  4-marked/prolonged    Maximal Dystonia(RLE)  0-absent  1-slight/intermittent  2-mild/common or moderate/intermittent  3-moderate/common  4-marked/prolonged    Maximal Dystonia(LLE)  0-absent  1-slight/intermittent  2-mild/common or moderate/intermittent  3-moderate/common  4-marked/prolonged    Maximal Chorea (Face)  0-absent  1-slight/intermittent  2-mild/common or moderate/intermittent  *3-moderate/common  4-marked/prolonged    Maximal Chorea (GABBIE)  0-absent  1-slight/intermittent  2-mild/common or moderate/intermittent  3-moderate/common  4-marked/prolonged    Maximal Chorea (Trunk)  0-absent  1-slight/intermittent  2-mild/common or moderate/intermittent  3-moderate/common  4-marked/prolonged    Maximal Chorea (RUE)  0-absent  1-slight/intermittent  2-mild/common or moderate/intermittent  3-moderate/common  *4-marked/prolonged    Maximal Chorea (LUE)  0-absent  1-slight/intermittent  2-mild/common or moderate/intermittent  *3-moderate/common  4-marked/prolonged    Maximal Chorea (LLE)  0-absent  1-slight/intermittent  2-mild/common or moderate/intermittent  *3-moderate/common  4-marked/prolonged    Maximal Chorea (RLE)  0-absent  1-slight/intermittent  2-mild/common or  moderate/intermittent  *3-moderate/common  4-marked/prolonged    Gait  0-normal narrow base  *1-wide base, and/or slow  2-wide base, walks with difficulty  3-walks with assistance  4-cannot attempt    Tandem Walking  0-normal for 10 steps  1-1-3 deviations  2->3 deviations  3-cannot complete  4-cannot attempt    Retropulsion  *0-normal  1-recovers spontaneously  2-would fall if not caught  3-falls spontaneously  4-cannot stand                     Assessment/Plan Davi Garsia is a   68 y.o. 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 stated 5 years ago and is getting worse.He also has short term memory sleep started 2 years ago.Examination is remarkable for severe tongue,trunk ad upper extremity chorea,mild bradykinesia in both upper and lower extremities and rigidity in lower extremities.Motor muscle strength exam is limited due to pain in LT upper extremity.He has Brain MRI on 2019 showed moderate brain parenchymal volume loss and small foci of bright signal on the T2 weighted images are noted within the subinsular regions, basal ganglia, and thalami bilaterally suggesting incidental mildly prominent perivascular spaces and/or small  scattered more remote lacunar infarctions.Blood work up for Copper,Ceruloplasmin,AWILDA.TPO 4 years ago were normal.      Plan:  Driving evaluation we highly recommend to refrain driving  Neuromuscular referral  EKG  If EKG normal start deutetrabenazine

## 2024-02-09 NOTE — PATIENT INSTRUCTIONS
It was pleasure to visit you  WE ordered Driving evaluation test.we highly recommend to refrain driving until you do driving test  We put a referral for Neuromuscular specialist  We put a referral for EKG  If EKG come back normal we can start medication called deutetrabenazine for chorea

## 2024-02-09 NOTE — LETTER
"February 9, 2024     Nikkie Matthews MD  [address]   [facility]  [facility] [address]    Patient: Davi Garsia   YOB: 1955   Date of Visit: 2/9/2024       Dear Dr. Nikkie Matthews MD:    Thank you for referring Davi Garsia to me for evaluation. Below are my notes for this consultation.  If you have questions, please do not hesitate to call me. I look forward to following your patient along with you.       Sincerely,     Claudine Velasquez MD      CC: Bridger Roldan MD  ______________________________________________________________________________________    Outpatient Psychiatry Initial Evaluation    Subjective  Mr. Davi Garsia is a 68 y.o. male with a history of chorea, multiple back surgeries, postlaminectomy syndrome. Seen in office today for psychiatric evaluation for clearance for genetic testing for [place] disease.     Seen with wife Nirmala.     Chief complaint:  psychiatric clearance for genetic testing for [place] disease.    History of Presenting Illness:   He says he could not sit still even as a kid and his mother would scold him. He says he has a history of involuntary movements \"for a while\" but they are getting worse. He saw Dr. Francisco Coles in 2019 for involuntary lip smacking and tongue darting movements and had metabolic workup, that was unremarkable and MRI brain that showed small vessel ischemic changes and generalized volume loss.     He says he also lost a lot of weight and this bothers him the most.     He also has cognitive concerns - reports short term memory loss.     He says he wants to know what is going on that is causing these abnormal movements and weight loss. He says that if he tested positive for HD, he knows it can't be cured but hopes he can be treated. He also hopes that he can gain some weight.     He says his mood is \"good.\"   Appetite - good. However, he has been losing weight.   Sleep - \"not good\" " "because of pain. His shoulder has been hurting - has surgery scheduled in March.   Energy - \"pretty good.\"   Concentration - \"depends on what I am doing.\"   Motivation - \"ok\"  Denies any hopelessness or worthlessness.   Denies any death wishes or suicidal thoughts, intent or plans.     Denies excessive worry or panic attacks.     Denies any manic or hypomanic episodes.     Denies any AVH, paranoia or delusions.     Denies any impulsive behaviors.     Denies any obsessive thoughts or compulsive behaviors.     He has a pain pump for back pain; says back pain is not bad.     Family history:   No family history of [place] disease or other neurodegenerative disorders.   Mother  close to age 90.   Father  from a heart attack in his 80s.     Current Medications:  Reviewed on Epic.   On escitalopram 5mg - says it was prescribed by pain management because it helps with pain.   On trazodone 150mg for sleep .      Psychiatric Review Of Systems:  As above.     Medical Review Of Systems:    Pertinent items are noted in HPI.    Past Psychiatric History:  Previous psychiatric treatment and medication trials: saw a provider at [facility] about 13 years ago but only for a few weeks. He says that this was when he was dealing with back pain. He does not remember what prescriptions were prescribed but says they were for sleeping.   Previous psychiatric hospitalizations: no  Previous diagnoses: no  Previous suicide attempts: no.   He had an accidental overdose on opioids about 25 years ago- he took oxycodone, went back to sleep but woke up after an hour and a half, and took a second pill, then went back to sleep, and woke up again after an hour and a half, and took a third one. He says his wife's daughter came home from school early for lunch and found him foaming at the mouth. Was hospitalized at [facility].   History of violence: no.    Substance Abuse History:  Recreational drugs:  used marijuana in the past every so " often - last time about 1 year ago . No other illicit drug use.   Use of alcohol:  no history of heavy drinking or abuse. Does not drink alcohol now.   Use of caffeine:  drinks diet Coke - about 1L a day. May drink coffe occasionally.   Tobacco use:  smokes about 10 cigarettes a day.   Legal consequences of chemical use: no    The following portions of the chart were reviewed this encounter and updated as appropriate:         Personal/Social history:   Raised by: biological parents.   Has six siblings.   Childhood trauma: none.     Education: graduated from high school.  Employment history: Worked for [employer] for 4meee. On disability since 1987 for back issues.     Relationship/marital status:  twice.  to current wife for 21 years.   Children: one son from previous marriage.   Living situation: lives at home with wife.   Safety at home: yes    Legal history: none   history: none    PMH/PSH:  Past Medical History:   Diagnosis Date   • Depression    • Fibromyalgia    • GERD (gastroesophageal reflux disease)    • Insomnia    • Osteoporosis    • Other conditions influencing health status     Arthritis   • Personal history of other infectious and parasitic diseases     History of mumps        Meds  Current Outpatient Medications on File Prior to Visit   Medication Sig Dispense Refill   • atorvastatin (Lipitor) 20 mg tablet Take 1 tablet (20 mg) by mouth once daily. 90 tablet 1   • escitalopram (Lexapro) 5 mg tablet Take 1 tablet (5 mg) by mouth once daily. 90 tablet 1   • gabapentin (Neurontin) 400 mg capsule Take 2 capsules (800 mg) by mouth 4 times a day. 360 capsule 0   • ketamine (Ketalar) 4 mg/mL swish & spit solution Swish and spit 1 time. Swish and swallow prn     • topiramate (Topamax) 100 mg tablet Take 1 tablet (100 mg) by mouth 2 times a day. 180 tablet 1   • traZODone (Desyrel) 150 mg tablet Take 1 tablet (150 mg) by mouth as needed at bedtime for sleep. 90 tablet 1  "    No current facility-administered medications on file prior to visit.        Allergies:   Allergies   Allergen Reactions   • Adhesive Tape-Silicones Unknown     Reactions: Blistering   • Cephalexin Unknown   • Clindamycin Unknown   • Linezolid Unknown   • Adhesive Rash     water blisters   • Penicillins Rash and Swelling     chest swelling       Record Review: moderate       Mental Status Evaluation:  Appearance:  thin; fair hygiene and grooming.   Behavior/Attitude: Cooperative. Pleasant.  Jokes at times.   Motor:  constant whole body and orofacial choreiform movements; seemed less prominent when he was tying shoelaces  Gait: choreiform movements while walking.   Speech: Regular in rate, tone and volume. No pressure.   Mood: \"good\"  Affect:  constricted; no apparent distress.   Thought process:  Fairly goal-directed but detailed, and somewhat circumstantial responses.   Thought content: No paranoia, delusion or ideas of reference elicited. No hallucinations in auditory, visual or other sensory modalities.    Suicidal ideation: denied.   Homicidal ideation: denied.   Insight: Fair  Judgment: Fair  Recent and remote memory:  reported subjective memory deficits but fair recall of recent and remote autobiographical information but some difficulty with details.   Attention/concentration: intact during visit   Language: No aphasia or paraphasic errors during conversation    Fund of knowledge: Average       Assessment/Plan  Assessment:   Mr. Davi Garsia is a 68 y.o. male with a history of chorea, multiple back surgeries, postlaminectomy syndrome. Seen in office today for psychiatric evaluation for clearance for genetic testing for [place] disease.     He reports dealing with depressive symptoms due to physical health issues and seeking psychiatric care for a brief period in 2013. Otherwise, he does not have a significant mood disorder, thought disorder, impulse control disorder or substance use disorder. He " has chronic pain and physical health concerns. He has support from his wife and family, although his wife is struggling with coping with his health issues.     He has been taking escitalopram (but unclear if this was for depression/anxeity) and trazodone for sleep.     Diagnosis:   Chorea    Treatment Plan/Recommendations:   - No psychiatric contraindication to genetic testing for HD.   - Will have  contact them to help with resources for patient and wife.   - Follow up with me only as needed.       Claudine Velasquez MD.

## 2024-02-09 NOTE — LETTER
02/10/24    Alcira Wooten MD  [address] [facility] [address]  [place] [address]      Dear Dr. Alcira Wooten MD,    I am writing to confirm that your patient, Davi Garsia  received care in my office on 02/10/24. I have enclosed a summary of the care provided to Davi for your reference.    Please contact me with any questions you may have regarding the visit.    Sincerely,         Nikkie Walter MD  [address] [facility] [address]  [facility] [address]  [phone]    CC: No Recipients

## 2024-02-09 NOTE — PROGRESS NOTES
"Outpatient Psychiatry Initial Evaluation    Subjective   Mr. Davi Garsia is a 68 y.o. male with a history of chorea, multiple back surgeries, postlaminectomy syndrome. Seen in office today for psychiatric evaluation for clearance for genetic testing for [place] disease.     Seen with wife Nirmala.     Chief complaint:  psychiatric clearance for genetic testing for Benny disease.    History of Presenting Illness:   He says he could not sit still even as a kid and his mother would scold him. He says he has a history of involuntary movements \"for a while\" but they are getting worse. He saw Dr. Francisco Coles in 2019 for involuntary lip smacking and tongue darting movements and had metabolic workup, that was unremarkable and MRI brain that showed small vessel ischemic changes and generalized volume loss.     He says he also lost a lot of weight and this bothers him the most.     He also has cognitive concerns - reports short term memory loss.     He says he wants to know what is going on that is causing these abnormal movements and weight loss. He says that if he tested positive for HD, he knows it can't be cured but hopes he can be treated. He also hopes that he can gain some weight.     He says his mood is \"good.\"   Appetite - good. However, he has been losing weight.   Sleep - \"not good\" because of pain. His shoulder has been hurting - has surgery scheduled in March.   Energy - \"pretty good.\"   Concentration - \"depends on what I am doing.\"   Motivation - \"ok\"  Denies any hopelessness or worthlessness.   Denies any death wishes or suicidal thoughts, intent or plans.     Denies excessive worry or panic attacks.     Denies any manic or hypomanic episodes.     Denies any AVH, paranoia or delusions.     Denies any impulsive behaviors.     Denies any obsessive thoughts or compulsive behaviors.     He has a pain pump for back pain; says back pain is not bad.     Family history:   No family history of " [place] disease or other neurodegenerative disorders.   Mother  close to age 90.   Father  from a heart attack in his 80s.     Current Medications:  Reviewed on Epic.   On escitalopram 5mg - says it was prescribed by pain management because it helps with pain.   On trazodone 150mg for sleep .      Psychiatric Review Of Systems:  As above.     Medical Review Of Systems:    Pertinent items are noted in HPI.    Past Psychiatric History:  Previous psychiatric treatment and medication trials: saw a provider at [facility] about 13 years ago but only for a few weeks. He says that this was when he was dealing with back pain. He does not remember what prescriptions were prescribed but says they were for sleeping.   Previous psychiatric hospitalizations: no  Previous diagnoses: no  Previous suicide attempts: no.   He had an accidental overdose on opioids about 25 years ago- he took oxycodone, went back to sleep but woke up after an hour and a half, and took a second pill, then went back to sleep, and woke up again after an hour and a half, and took a third one. He says his wife's daughter came home from school early for lunch and found him foaming at the mouth. Was hospitalized at [place].   History of violence: no.    Substance Abuse History:  Recreational drugs:  used marijuana in the past every so often - last time about 1 year ago . No other illicit drug use.   Use of alcohol:  no history of heavy drinking or abuse. Does not drink alcohol now.   Use of caffeine:  drinks diet Coke - about 1L a day. May drink coffe occasionally.   Tobacco use:  smokes about 10 cigarettes a day.   Legal consequences of chemical use: no    The following portions of the chart were reviewed this encounter and updated as appropriate:         Personal/Social history:   Raised by: biological parents.   Has six siblings.   Childhood trauma: none.     Education: graduated from high school.  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 On disability since 1987 for back issues.     Relationship/marital status:  twice.  to current wife for 21 years.   Children: one son from previous marriage.   Living situation: lives at home with wife.   Safety at home: yes    Legal history: none   history: none    PMH/PSH:  Past Medical History:   Diagnosis Date    Depression     Fibromyalgia     GERD (gastroesophageal reflux disease)     Insomnia     Osteoporosis     Other conditions influencing health status     Arthritis    Personal history of other infectious and parasitic diseases     History of mumps        Meds  Current Outpatient Medications on File Prior to Visit   Medication Sig Dispense Refill    atorvastatin (Lipitor) 20 mg tablet Take 1 tablet (20 mg) by mouth once daily. 90 tablet 1    escitalopram (Lexapro) 5 mg tablet Take 1 tablet (5 mg) by mouth once daily. 90 tablet 1    gabapentin (Neurontin) 400 mg capsule Take 2 capsules (800 mg) by mouth 4 times a day. 360 capsule 0    ketamine (Ketalar) 4 mg/mL swish & spit solution Swish and spit 1 time. Swish and swallow prn      topiramate (Topamax) 100 mg tablet Take 1 tablet (100 mg) by mouth 2 times a day. 180 tablet 1    traZODone (Desyrel) 150 mg tablet Take 1 tablet (150 mg) by mouth as needed at bedtime for sleep. 90 tablet 1     No current facility-administered medications on file prior to visit.        Allergies:   Allergies   Allergen Reactions    Adhesive Tape-Silicones Unknown     Reactions: Blistering    Cephalexin Unknown    Clindamycin Unknown    Linezolid Unknown    Adhesive Rash     water blisters    Penicillins Rash and Swelling     chest swelling       Record Review: moderate       Mental Status Evaluation:  Appearance:  thin; fair hygiene and grooming.   Behavior/Attitude: Cooperative. Pleasant.  Jokes at times.   Motor:  constant whole body and orofacial choreiform movements; seemed less prominent when he was tying shoelaces  Gait:  "choreiform movements while walking.   Speech: Regular in rate, tone and volume. No pressure.   Mood: \"good\"  Affect:  constricted; no apparent distress.   Thought process:  Fairly goal-directed but detailed, and somewhat circumstantial responses.   Thought content: No paranoia, delusion or ideas of reference elicited. No hallucinations in auditory, visual or other sensory modalities.    Suicidal ideation: denied.   Homicidal ideation: denied.   Insight: Fair  Judgment: Fair  Recent and remote memory:  reported subjective memory deficits but fair recall of recent and remote autobiographical information but some difficulty with details.   Attention/concentration: intact during visit   Language: No aphasia or paraphasic errors during conversation    Fund of knowledge: Average       Assessment/Plan   Assessment:   Mr. Davi Garsia is a 68 y.o. male with a history of chorea, multiple back surgeries, postlaminectomy syndrome. Seen in office today for psychiatric evaluation for clearance for genetic testing for Benny disease.     He reports dealing with depressive symptoms due to physical health issues and seeking psychiatric care for a brief period in 2013. Otherwise, he does not have a significant mood disorder, thought disorder, impulse control disorder or substance use disorder. He has chronic pain and physical health concerns. He has support from his wife and family, although his wife is struggling with coping with his health issues.     He has been taking escitalopram (but unclear if this was for depression/anxeity) and trazodone for sleep.     Diagnosis:   Chorea    Treatment Plan/Recommendations:   - No psychiatric contraindication to genetic testing for HD.   - Will have  contact them to help with resources for patient and wife.   - Follow up with me only as needed.       Claudine Velasquez MD.  "

## 2024-02-12 ENCOUNTER — TELEPHONE (OUTPATIENT)
Dept: GENETICS | Facility: CLINIC | Age: 69
End: 2024-02-12
Payer: MEDICARE

## 2024-02-12 DIAGNOSIS — G25.5 CHOREA: Primary | ICD-10-CM

## 2024-02-12 NOTE — TELEPHONE ENCOUNTER
"----- Message from Sharmaine Montero MA sent at 2/12/2024  2:15 PM EST -----  Regarding: RE: [place] Medicare inquiry  Pt aware and schedule for 3/8  ----- Message -----  From: Nikkie Matthews MD  Sent: 2/12/2024  12:54 PM EST  To: Sharmaine Montero MA  Subject: RE: [place] Medicare inquiry                      Please tell patient it does not require or offer preauth.  It will likely be covered by insurance given this and the fact that he has chorea.  Order is in.  Please schedule him for results in the 3/8 HD clinic if he will have blood drawn this week.  ----- Message -----  From: Sharmaine Montero MA  Sent: 2/12/2024  12:06 PM EST  To: Nikkie Matthews MD  Subject: RE: [place] Medicare antione                      Called insurance. I could only get an automated response. It just asked for CPT code. (It did not ask for diagnosis or where lab is being done). According to the automated response  \"CPT code 77272 does not require prior authorization\"  Sharmaine Montero MA    ----- Message -----  From: Nikkie Matthews MD  Sent: 2/10/2024  10:13 AM EST  To: Sharmaine Montero MA  Subject: [employer] Medicare inquiry                          Sharmaine Pinto,    I don't know if there is a [facility] Monday morning (Gwen will be out), but if so, please delegate this time-sensitive task.  Would you or [facility] be able to call patient's insurance company to ask if this test NEEDS or has OPTIONAL preauthorization?      If it does not need preauth and one cannot be done, he would like to have blood drawn Monday 2/12 (I would place an order).  I told him we would update him either way.    The test:    [facility]    [place] [facility]ation [facility], test code 58393    The [place]PT code is 61187.    The ICD10 code is G25.5 (chorea)    Thanks!          "

## 2024-02-13 ENCOUNTER — LAB (OUTPATIENT)
Dept: LAB | Facility: LAB | Age: 69
End: 2024-02-13
Payer: MEDICARE

## 2024-02-13 DIAGNOSIS — G25.5 CHOREA: ICD-10-CM

## 2024-02-16 ENCOUNTER — TELEPHONE (OUTPATIENT)
Dept: GERIATRIC MEDICINE | Facility: CLINIC | Age: 69
End: 2024-02-16
Payer: MEDICARE

## 2024-02-16 NOTE — TELEPHONE ENCOUNTER
I followed up with pt, per Dr. Roldan. Discussed situation and introduced myself. His father's family was from [place] and so some of the family history is obscure. He sounds as if he is going to go ahead with further testing. Asked about home situation. Says wife gets a little upset that he forgets things. He feels she has some memory issues, as well. Daughter comes around, but the rest of the kids are out of state. It is a second marriage for them. Pt says he and wife are managing their routine at home, despite wife using rollator. She cannot walk well, as she has neuropathy. She does not drive. He feels his driving is OK and they go close by, but we talked about distance not figuring in all the time. People can have accidents right in the neighborhood. He agrees. I gave him the contact #'s for the driving evaluations at [facility] and [facility]. He is going to wait on it due to upcoming shoulder surgery. Won't be driving anyway. I mentioned [facility] as a transportation option in [facility]. [facility] is familiar with it. Also gave him the # to the SW at [facility] and my #, as well. He will follow up as needed.

## 2024-02-19 ENCOUNTER — OFFICE VISIT (OUTPATIENT)
Dept: NEUROLOGY | Facility: CLINIC | Age: 69
End: 2024-02-19
Payer: MEDICARE

## 2024-02-19 DIAGNOSIS — G25.5 CHOREA: ICD-10-CM

## 2024-02-19 DIAGNOSIS — R29.898 LEFT ARM WEAKNESS: Primary | ICD-10-CM

## 2024-02-19 PROCEDURE — 1125F AMNT PAIN NOTED PAIN PRSNT: CPT | Performed by: STUDENT IN AN ORGANIZED HEALTH CARE EDUCATION/TRAINING PROGRAM

## 2024-02-19 PROCEDURE — 1160F RVW MEDS BY RX/DR IN RCRD: CPT | Performed by: STUDENT IN AN ORGANIZED HEALTH CARE EDUCATION/TRAINING PROGRAM

## 2024-02-19 PROCEDURE — 99205 OFFICE O/P NEW HI 60 MIN: CPT | Performed by: STUDENT IN AN ORGANIZED HEALTH CARE EDUCATION/TRAINING PROGRAM

## 2024-02-19 PROCEDURE — 99215 OFFICE O/P EST HI 40 MIN: CPT | Mod: GC | Performed by: STUDENT IN AN ORGANIZED HEALTH CARE EDUCATION/TRAINING PROGRAM

## 2024-02-19 PROCEDURE — 1159F MED LIST DOCD IN RCRD: CPT | Performed by: STUDENT IN AN ORGANIZED HEALTH CARE EDUCATION/TRAINING PROGRAM

## 2024-02-19 NOTE — PROGRESS NOTES
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  He now presents with many months to over a year of left arm weakness proximally and distally which is heavily confounded by severe left shoulder pain which is necessitating a replacement.  He has both weakness most severe in left deltoid and distally in the hand, reduced reflexes bilaterally, and diffuse patchy sensory loss worse over the lateral forearm and the hand, however it should be noted there is some inconsistency.  It is unlikely this is purely musculoskeletal and pain related given the distal weakness and sensory changes.  Differential includes a left cervical radiculopathy and/or brachial plexopathy.  He will need an EMG, however he is undergoing a left shoulder replacement the next couple weeks and he would like to fully recover from the surgery before pursuing the EMG.    Plan:  Problem List Items Addressed This Visit       Chorea    Left arm weakness - Primary    Relevant Orders    EMG & nerve conduction to be done after his surgery and recovery     Valeriy Fernandez MD  Neuromuscular Fellow       History of Present Illness:    Mr. Garsia is a 68 y.o. right handed male who presents for evaluation of left arm weakness. He is accompanied by his wife.     He has a chorea for years. He has \"always been a \" and is being evaluated for chorea and HD by movement disorders.     He also has a lot of musculoskeletal complaints including severe left shoulder pain, which she was planning for left shoulder replacement the end of March.  He notes weakness in the left arm and hand going on for at least 6 months but may be longer than a year.  This is heavily confounded by pain in the " left arm.    In addition, he has neuropathy diagnosis, however unclear when this started or etiology. He describes radicular type symptoms in the legs bilaterally rather than numbness tingling in the feet. He gets pain, throbbing, aching in the legs. Gabapentin 800mg QID which may or may not help. He had a pain pump and seen by Dr Wing from pain medicine. He has lumbar postlaminectomy syndrome after lumbar L5/S1 laminectomy in 1989. Ever since he has had back pain and shooting pains down the leg. He has left shoulder pain.     Family history: no neurological disorders.     Relevant past medical, surgical, family, and social histories, along with ROS was reviewed and pertinent details noted above.     Past Medical History:   Diagnosis Date    Depression     Fibromyalgia     GERD (gastroesophageal reflux disease)     Insomnia     Osteoporosis     Other conditions influencing health status     Arthritis    Personal history of other infectious and parasitic diseases     History of mumps     Past Surgical History:   Procedure Laterality Date    BACK SURGERY      COLONOSCOPY W/ BIOPSIES      PENILE PROSTHESIS IMPLANT      SHOULDER      TOTAL KNEE ARTHROPLASTY       Family History   Problem Relation Name Age of Onset    Dementia Mother      Mental illness Mother      Heart attack Father      Diabetes Father      Other (brain tumor) Father      Heart disease Father      Cancer Maternal Grandfather      Leukemia Other      Pancreatic cancer Other       Social History     Tobacco Use    Smoking status: Every Day     Packs/day: 0.50     Years: 51.00     Additional pack years: 0.00     Total pack years: 25.50     Types: Cigarettes     Start date: 1972     Passive exposure: Current    Smokeless tobacco: Never   Substance Use Topics    Alcohol use: Never      Allergies   Allergen Reactions    Adhesive Tape-Silicones Unknown     Reactions: Blistering    Cephalexin Unknown    Clindamycin Unknown    Linezolid Unknown    Adhesive  Rash     water blisters    Penicillins Rash and Swelling     chest swelling        Medications:    Current Outpatient Medications:     atorvastatin (Lipitor) 20 mg tablet, Take 1 tablet (20 mg) by mouth once daily., Disp: 90 tablet, Rfl: 1    escitalopram (Lexapro) 5 mg tablet, Take 1 tablet (5 mg) by mouth once daily., Disp: 90 tablet, Rfl: 1    gabapentin (Neurontin) 400 mg capsule, Take 2 capsules (800 mg) by mouth 4 times a day., Disp: 360 capsule, Rfl: 0    ketamine (Ketalar) 4 mg/mL swish & spit solution, Swish and spit 1 time. Swish and swallow prn, Disp: , Rfl:     topiramate (Topamax) 100 mg tablet, Take 1 tablet (100 mg) by mouth 2 times a day., Disp: 180 tablet, Rfl: 1    traZODone (Desyrel) 150 mg tablet, Take 1 tablet (150 mg) by mouth as needed at bedtime for sleep., Disp: 90 tablet, Rfl: 1       Physical Exam:   There were no vitals taken for this visit.     General Appearance:  No distress, alert, interactive and cooperative.   Neurological:  Mental status: the patient provided an accurate history, language was normal.   Cranial Nerves:  CN 3, 4, 6    Lids symmetric; no ptosis.   EOMs normal alignment, full range, with normal pursuit and convergence  No nystagmus.   CN 5   Jaw opening 5/5   CN 7   Normal and symmetric facial strength. Nasolabial folds symmetric.   Able to lift eyebrows and close eye lids with eye lashes buried symmetrically.   CN 8   Hearing intact to conversation.     CN 9, 10   Palate elevates symmetrically.   Phonation within normal limits, no dysarthria.   CN 11   Normal strength of shoulder shrug and neck turning.   CN 12   Tongue midline, with normal bulk and strength; no fasciculations.     Motor:   Muscle bulk: there is marked atrophy throughout upper lower and trunk muscles.  Muscle tone: Normal in both upper and lower extremities.  Movements: No fasciculations, tremors, or other abnormal movement.     Manual Muscle Testing (MMT) reveals the following MRC grades:  Neck  Flexion 5  Neck Extension 5  R L   Shoulder abduction  5 3  Elbow flexion   5 5-*  Elbow extension  5 5-*  Finger flexion   5 4  Finger extension  5 4  Finger abduction  5 4  Thumb abduction   5 4  Hip flexion   5- 5-  Hip extension   5 5  Hip abduction    5 5  Hip adduction    5 5  Knee flexion   5 5  Knee extension  5 5  Ankle dorsiflexion  5 5  Ankle plantarflexion  5 5  Ankle Inversion   5 5  Ankle Eversion   5 5    *Pain limited and give way in bicep/triceps    Reflexes:     R          L  BR:               Trace bilaterally    Biceps:         1          1  Triceps:        Trace bilaterally  Knee:           1          0  Ankle:          0          0    Babinski: Toes are down going    Sensory:   Reduced sensation to light touch, Temp, and PP in the left lateral forearm and digits 1-4.     Coordination:    Disoriented movements with chorea throughout.     Gait:   Choreiform gait, the right foot is externally rotated (childhood injury), stumbles when walking.         Results:     The following labs, imaging, and/or data were personally reviewed and demonstrated:    Hemoglobin A1C  Order: [id number] - Reflex for Order [id number]      Component  Ref Range & Units 7 mo ago   Hemoglobin A1C  4.0 - 6.0 % 5.1        Component  Ref Range & Units 7 mo ago  (6/26/23) 1 yr ago  (8/22/22) 1 yr ago  (6/22/22) 1 yr ago  (6/20/22) 3 yr ago  (1/14/21) 3 yr ago  (9/5/20) 3 yr ago  (7/24/20)   Glucose  65 - 99 MG/ High  113 High  136 High  111 High  106 High  89 R 123 High    Urea Nitrogen  8 - 25 MG/DL 13 14 14 12 13 12 R 17   Creatinine  0.4 - 1.6 MG/DL 0.9 0.8 0.6 0.8 1.0 0.64 R 0.8   Urea Nitrogen/Creatinine (g/g) Urine  8 - 21 RATIO 14.4 17.5 23.3 High  15.0 13.0  21.3 High    Sodium  133 - 145 MMOL/L 140 141 136 141 140 138 R 138   Potassium  3.4 - 5.1 MMOL/L 3.8 4.3 4.4 3.5 4.0 4.1 R 4.5   Chloride  97 - 107 MMOL/L 106 108 High  104 106 108 High  100 R 102   Bicarbonate  24 - 31 MMOL/L 27 23 Low  21 Low  25 24 25 R 24    Anion Gap  0 - 19 MMOL/L 7 10 11 10 8 17 R 12   Calcium  8.5 - 10.4 MG/DL 9.1 9.0 8.6 8.7 9.2 8.9 R 8.8       MRI L Spine 2012:  IMPRESSION:         1. Bilateral neural foraminal stenosis at L5-S1 due to  grade I (7 mm) spondylolisthesis secondary to bilateral spondylolysis of L5  accompanied by diffuse disc bulging, encroaching on the caudal margin of the  exiting L5 nerve roots bilaterally, but no central spinal stenosis or focal  disc herniation.  2. Status post left-sided laminectomy of L5.  3. No other significant abnormality.     ADDENDUM 10/05/2012 02:08PM:     Addendum to the report of MRI of lumbar spine without contrast by Dr. Persaud:     Reviewing the MRI after discussion with Dr. Serna, there is a tiny focal  susceptibility artifact measuring 3 to 5 mm within the right side of the  thecal sac along the right side of conus medullaris at the level of T12-L1,  consistent with the tip of the intrathecal pain medication pump catheter.  Although one cannot definitely rule out the possibility of a 3 mm or less  catheter tip granuloma because of the susceptibility artifact, there is no  discrete mass or impingement on the right side of the conus medullaris or  displacement of the cauda equina. Contrast enhanced MRI could not be  performed because patient was unable to tolerate the scan any longer.  END OF ADDENDUM

## 2024-02-19 NOTE — PATIENT INSTRUCTIONS
Want to evaluate your left arm weakness with an EMG test.     Call [phone] to schedule your EMG after your surgery when you are ready (about May).

## 2024-02-21 ENCOUNTER — HOSPITAL ENCOUNTER (OUTPATIENT)
Dept: CARDIOLOGY | Facility: HOSPITAL | Age: 69
Discharge: HOME | End: 2024-02-21
Payer: MEDICARE

## 2024-02-21 ENCOUNTER — OFFICE VISIT (OUTPATIENT)
Dept: PAIN MEDICINE | Facility: HOSPITAL | Age: 69
End: 2024-02-21
Payer: MEDICARE

## 2024-02-21 DIAGNOSIS — M96.1 POST LAMINECTOMY SYNDROME: Primary | ICD-10-CM

## 2024-02-21 DIAGNOSIS — G25.5 CHOREA: ICD-10-CM

## 2024-02-21 LAB
ATRIAL RATE: 67 BPM
P AXIS: 66 DEGREES
P OFFSET: 209 MS
P ONSET: 156 MS
PR INTERVAL: 128 MS
Q ONSET: 220 MS
QRS COUNT: 11 BEATS
QRS DURATION: 148 MS
QT INTERVAL: 418 MS
QTC CALCULATION(BAZETT): 441 MS
QTC FREDERICIA: 434 MS
R AXIS: 86 DEGREES
T AXIS: 47 DEGREES
T OFFSET: 429 MS
VENTRICULAR RATE: 67 BPM

## 2024-02-21 PROCEDURE — 93005 ELECTROCARDIOGRAM TRACING: CPT

## 2024-02-21 PROCEDURE — 1160F RVW MEDS BY RX/DR IN RCRD: CPT | Performed by: ANESTHESIOLOGY

## 2024-02-21 PROCEDURE — 1159F MED LIST DOCD IN RCRD: CPT | Performed by: ANESTHESIOLOGY

## 2024-02-21 PROCEDURE — 99214 OFFICE O/P EST MOD 30 MIN: CPT | Performed by: ANESTHESIOLOGY

## 2024-02-21 PROCEDURE — 1125F AMNT PAIN NOTED PAIN PRSNT: CPT | Performed by: ANESTHESIOLOGY

## 2024-02-21 PROCEDURE — 62368 ANALYZE SP INF PUMP W/REPROG: CPT | Performed by: ANESTHESIOLOGY

## 2024-02-21 RX ORDER — AMITRIPTYLINE HYDROCHLORIDE 10 MG/1
10 TABLET, FILM COATED ORAL NIGHTLY
Qty: 30 TABLET | Refills: 11 | Status: SHIPPED | OUTPATIENT
Start: 2024-02-21 | End: 2024-04-24 | Stop reason: SDUPTHER

## 2024-02-21 ASSESSMENT — PAIN - FUNCTIONAL ASSESSMENT: PAIN_FUNCTIONAL_ASSESSMENT: 0-10

## 2024-02-21 ASSESSMENT — PAIN SCALES - GENERAL: PAINLEVEL_OUTOF10: 6

## 2024-02-21 NOTE — PROGRESS NOTES
History Of Present Illness  Davi Garsia is a 68 y.o. male presenting as a follow-up visit last seen 12/10/2020, for back and leg pain.  Has history of postlaminectomy syndrome with history of IDDS, status post infection and reimplantation in 2020.  Since then, patient has been complaining of worsening pain in his legs as well as left shoulder.  He does have pending shoulder surgery at the end of March for the left.  He is also been complaining of bilateral leg pain that is posterior down his legs into his feet that wakes him up at nighttime.  His main problem is the leg pain at this point in time.  He feels his back pain is well-controlled.       Review of Systems   13 point ROS done and negative except for the above.     Physical Exam  PHYSICAL EXAM  Vitals signs reviewed  Constitutional:    General: Cachectic, not in acute distress   Appearance: Normal appearance. Not ill-appearing.  HENT:   Head: Normocephalic and atraumatic  Eyes:   Conjunctiva/sclera normal  Cardiovascular:  No jugular venous distention bilaterally  No gross edema in lower extremities  Pulmonary:   Effort: No respiratory distress  Abdominal:  Abdomen appears nondistended  Musculoskeletal:   Moves all extremities equally  Skin:   General: Skin is warm and dry  Neurological:  Choreiform movements  Sensation to cold decreased on the right up to T2 and on the left T6  Sensation to cold intact at bilateral medial malleoli  Psychiatric:    Mood and Affect: Mood normal    Behavior: Behavior normal    Last Recorded Vitals  There were no vitals taken for this visit.    Relevant Results        ASSESSMENT:  Assessment/Plan   Problem List Items Addressed This Visit    None  Visit Diagnoses         Codes    Post laminectomy syndrome     M96.1    Relevant Orders    MR lumbar spine wo IV contrast    Epidural Steroid Injection    FL pain management TC            Patient is a 68-year-old male with history of chorea and L4/5 laminectomy with instrumented  fusion presents a follow-up visit today for bilateral leg pain as well as left shoulder pain.  Patient has upcoming shoulder replacement at the end of March.  Patient says he has worsening leg pain that is posterior going down to his feet that wakes him at night.  Patient be complaining of decreased to no efficacy with his my PTM bolus dosing, currently set at 0.05 mg of morphine.  Gave IDDS bolus (morphine 0.075 mg/bupivacaine 1 mg) x 1 dose in the office, with resulting examination findings as described above; less concerning for IDDS malfunction/malpositioning.  Will start amitriptyline at nighttime, and also order L-spine MRI given above exam findings and worsening leg pain.  Will schedule for caudal epidural steroid injection.  Will avoid any shoulder injections given upcoming surgery.    PLAN:  - Start amitriptyline 10 mg nightly.  - Schedule L-spine MRI.  - Schedule for caudal epidural steroid injection.  Risks, benefits and alternatives of the procedure were discussed with the patient who expressed understanding and agrees to proceed.        The plan was discussed with the patient and they were invited to contact us back anytime with any questions or concerns and follow-up with us in the office as needed.    Parker Lara MD

## 2024-02-23 DIAGNOSIS — G25.5 CHOREA: Primary | ICD-10-CM

## 2024-02-23 RX ORDER — DEUTETRABENAZINE 6 MG/1
3 TABLET, FILM COATED, EXTENDED RELEASE ORAL DAILY
Qty: 90 TABLET | Refills: 6 | Status: SHIPPED | OUTPATIENT
Start: 2024-02-23 | End: 2024-03-15

## 2024-02-27 ENCOUNTER — TELEPHONE (OUTPATIENT)
Dept: NEUROLOGY | Facility: CLINIC | Age: 69
End: 2024-02-27
Payer: MEDICARE

## 2024-02-27 RX ORDER — TETRABENAZINE 12.5 MG/1
TABLET, COATED ORAL
Qty: 30 TABLET | Refills: 11 | Status: SHIPPED | OUTPATIENT
Start: 2024-02-27 | End: 2024-03-15

## 2024-02-27 NOTE — TELEPHONE ENCOUNTER
Discussed with Dr Roldan.As the insurance philipp not cover [place].We plan order Tetrabenazine.Called the patient and informed him.He has recent EKG which has Normal QTC. Depression is also controlled with Escitaloperam

## 2024-02-27 NOTE — TELEPHONE ENCOUNTER
Patient is calling back stating that medicine is [phone] and he would like to know what other medication he can try would like a call back please

## 2024-02-29 ENCOUNTER — TELEPHONE (OUTPATIENT)
Dept: GERIATRIC MEDICINE | Facility: CLINIC | Age: 69
End: 2024-02-29
Payer: MEDICARE

## 2024-02-29 NOTE — TELEPHONE ENCOUNTER
I received a call from Raisa Monet, the [employer] . ([phone].) Mr. Garsia contacted her at my suggestion and they discussed his situation. His wife has her own issues and now he, too, is not driving due to his HD issues. Transportation is a problem in terms of making it back to the [facility] office for followup HD appts. I had suggested Zelalem Rivas, as they live in [facility], but they will only transport people out of [facility] on the 2nd Monday or 2nd Thursday of the month, and [facility] Clinic falls on the 2nd Friday. Raisa also wondered if Dr. Roldan would consider doing virtual visits. I told Raisa I would email him to ask and then let her know. I sent Dr. Roldan an email.

## 2024-03-01 ENCOUNTER — TELEPHONE (OUTPATIENT)
Dept: GENETICS | Facility: CLINIC | Age: 69
End: 2024-03-01
Payer: MEDICARE

## 2024-03-01 ENCOUNTER — TELEPHONE (OUTPATIENT)
Dept: GERIATRIC MEDICINE | Facility: CLINIC | Age: 69
End: 2024-03-01
Payer: MEDICARE

## 2024-03-01 ENCOUNTER — TELEPHONE (OUTPATIENT)
Dept: NEUROLOGY | Facility: CLINIC | Age: 69
End: 2024-03-01
Payer: MEDICARE

## 2024-03-01 NOTE — TELEPHONE ENCOUNTER
I received an email back from Dr. Roldan and he said that he is not in clinic on Thursdays and doing procedures on Mondays, but they can accommodate Mr. Garsia by providing either a virtual visit or seeing the neurology nurse practitioner on a Monday, when [employer] transportation could bring him. I called back to Raisa Monet, the [employer] . Left voicemail with above information and asked if she might let Mr. Garsia know. If not, I will be glad to call him.

## 2024-03-05 ENCOUNTER — HOSPITAL ENCOUNTER (OUTPATIENT)
Dept: RADIOLOGY | Facility: HOSPITAL | Age: 69
Discharge: HOME | End: 2024-03-05
Payer: MEDICARE

## 2024-03-05 ENCOUNTER — APPOINTMENT (OUTPATIENT)
Dept: NEUROLOGY | Facility: CLINIC | Age: 69
End: 2024-03-05
Payer: MEDICARE

## 2024-03-05 DIAGNOSIS — M96.1 POST LAMINECTOMY SYNDROME: ICD-10-CM

## 2024-03-05 DIAGNOSIS — G62.9 NEUROPATHY: ICD-10-CM

## 2024-03-05 PROCEDURE — 72148 MRI LUMBAR SPINE W/O DYE: CPT

## 2024-03-05 PROCEDURE — 72148 MRI LUMBAR SPINE W/O DYE: CPT | Performed by: RADIOLOGY

## 2024-03-08 ENCOUNTER — APPOINTMENT (OUTPATIENT)
Dept: GENETICS | Facility: CLINIC | Age: 69
End: 2024-03-08
Payer: MEDICARE

## 2024-03-08 ENCOUNTER — TELEPHONE (OUTPATIENT)
Dept: NEUROLOGY | Facility: CLINIC | Age: 69
End: 2024-03-08

## 2024-03-08 NOTE — TELEPHONE ENCOUNTER
Patient called stated medication Austedo was $9000 and generic was $75 for a 1 month supply patient wants to know is there and another at a more affordable price

## 2024-03-11 RX ORDER — GABAPENTIN 400 MG/1
CAPSULE ORAL
Qty: 360 CAPSULE | Refills: 0 | Status: SHIPPED | OUTPATIENT
Start: 2024-03-11 | End: 2024-03-26

## 2024-03-12 ENCOUNTER — APPOINTMENT (OUTPATIENT)
Dept: RADIOLOGY | Facility: HOSPITAL | Age: 69
End: 2024-03-12
Payer: MEDICARE

## 2024-03-12 ENCOUNTER — TELEPHONE (OUTPATIENT)
Dept: NEUROLOGY | Facility: CLINIC | Age: 69
End: 2024-03-12
Payer: MEDICARE

## 2024-03-12 DIAGNOSIS — G25.5 CHOREA: Primary | ICD-10-CM

## 2024-03-12 RX ORDER — AMANTADINE HYDROCHLORIDE 100 MG/1
TABLET ORAL
Qty: 60 TABLET | Refills: 0 | Status: SHIPPED | OUTPATIENT
Start: 2024-03-12 | End: 2024-04-03 | Stop reason: ALTCHOICE

## 2024-03-12 RX ORDER — AMANTADINE HYDROCHLORIDE 100 MG/1
TABLET ORAL
Qty: 180 TABLET | Refills: 3 | Status: SHIPPED | OUTPATIENT
Start: 2024-03-12 | End: 2024-04-24 | Stop reason: WASHOUT

## 2024-03-12 NOTE — TELEPHONE ENCOUNTER
Called Pt sent VM.Offered Amantadine and asked patient call back if he is interested in starting Amantadine.

## 2024-03-13 NOTE — TELEPHONE ENCOUNTER
Patient called asking about medications he can take for shaking... stated no one has called him yet.

## 2024-03-15 ENCOUNTER — APPOINTMENT (OUTPATIENT)
Dept: RADIOLOGY | Facility: HOSPITAL | Age: 69
End: 2024-03-15
Payer: MEDICARE

## 2024-03-15 ENCOUNTER — LAB (OUTPATIENT)
Dept: LAB | Facility: LAB | Age: 69
End: 2024-03-15
Payer: MEDICARE

## 2024-03-15 ENCOUNTER — HOSPITAL ENCOUNTER (OUTPATIENT)
Dept: RADIOLOGY | Facility: CLINIC | Age: 69
End: 2024-03-15
Payer: MEDICARE

## 2024-03-15 ENCOUNTER — HOSPITAL ENCOUNTER (OUTPATIENT)
Dept: RADIOLOGY | Facility: HOSPITAL | Age: 69
Discharge: HOME | End: 2024-03-15
Payer: MEDICARE

## 2024-03-15 ENCOUNTER — PRE-ADMISSION TESTING (OUTPATIENT)
Dept: PREADMISSION TESTING | Facility: HOSPITAL | Age: 69
End: 2024-03-15
Payer: MEDICARE

## 2024-03-15 VITALS
HEART RATE: 73 BPM | SYSTOLIC BLOOD PRESSURE: 128 MMHG | OXYGEN SATURATION: 99 % | WEIGHT: 134.9 LBS | TEMPERATURE: 98.6 F | DIASTOLIC BLOOD PRESSURE: 80 MMHG | BODY MASS INDEX: 19.98 KG/M2 | HEIGHT: 69 IN

## 2024-03-15 DIAGNOSIS — R79.9 ABNORMAL FINDING OF BLOOD CHEMISTRY, UNSPECIFIED: ICD-10-CM

## 2024-03-15 DIAGNOSIS — M12.812 ROTATOR CUFF ARTHROPATHY, LEFT: ICD-10-CM

## 2024-03-15 DIAGNOSIS — Z01.818 PREOP TESTING: ICD-10-CM

## 2024-03-15 DIAGNOSIS — M12.812 OTHER SPECIFIC ARTHROPATHIES, NOT ELSEWHERE CLASSIFIED, LEFT SHOULDER: ICD-10-CM

## 2024-03-15 DIAGNOSIS — Z01.818 PREOP TESTING: Primary | ICD-10-CM

## 2024-03-15 LAB
ANION GAP SERPL CALC-SCNC: 9 MMOL/L
APPEARANCE UR: CLEAR
BASOPHILS # BLD AUTO: 0.06 X10*3/UL (ref 0–0.1)
BASOPHILS NFR BLD AUTO: 1.2 %
BILIRUB UR STRIP.AUTO-MCNC: NEGATIVE MG/DL
BUN SERPL-MCNC: 14 MG/DL (ref 8–25)
CALCIUM SERPL-MCNC: 8.9 MG/DL (ref 8.5–10.4)
CHLORIDE SERPL-SCNC: 103 MMOL/L (ref 97–107)
CO2 SERPL-SCNC: 25 MMOL/L (ref 24–31)
COLOR UR: NORMAL
CREAT SERPL-MCNC: 0.9 MG/DL (ref 0.4–1.6)
EGFRCR SERPLBLD CKD-EPI 2021: >90 ML/MIN/1.73M*2
EOSINOPHIL # BLD AUTO: 0.1 X10*3/UL (ref 0–0.7)
EOSINOPHIL NFR BLD AUTO: 2 %
ERYTHROCYTE [DISTWIDTH] IN BLOOD BY AUTOMATED COUNT: 12.8 % (ref 11.5–14.5)
EST. AVERAGE GLUCOSE BLD GHB EST-MCNC: 108 MG/DL
GLUCOSE SERPL-MCNC: 103 MG/DL (ref 65–99)
GLUCOSE UR STRIP.AUTO-MCNC: NORMAL MG/DL
HBA1C MFR BLD: 5.4 %
HCT VFR BLD AUTO: 41.3 % (ref 41–52)
HGB BLD-MCNC: 13.5 G/DL (ref 13.5–17.5)
HOLD SPECIMEN: NORMAL
IMM GRANULOCYTES # BLD AUTO: 0.01 X10*3/UL (ref 0–0.7)
IMM GRANULOCYTES NFR BLD AUTO: 0.2 % (ref 0–0.9)
KETONES UR STRIP.AUTO-MCNC: NEGATIVE MG/DL
LEUKOCYTE ESTERASE UR QL STRIP.AUTO: NEGATIVE
LYMPHOCYTES # BLD AUTO: 1.46 X10*3/UL (ref 1.2–4.8)
LYMPHOCYTES NFR BLD AUTO: 28.7 %
MCH RBC QN AUTO: 31 PG (ref 26–34)
MCHC RBC AUTO-ENTMCNC: 32.7 G/DL (ref 32–36)
MCV RBC AUTO: 95 FL (ref 80–100)
MONOCYTES # BLD AUTO: 0.33 X10*3/UL (ref 0.1–1)
MONOCYTES NFR BLD AUTO: 6.5 %
NEUTROPHILS # BLD AUTO: 3.13 X10*3/UL (ref 1.2–7.7)
NEUTROPHILS NFR BLD AUTO: 61.4 %
NITRITE UR QL STRIP.AUTO: NEGATIVE
NRBC BLD-RTO: 0 /100 WBCS (ref 0–0)
PH UR STRIP.AUTO: 5.5 [PH]
PLATELET # BLD AUTO: 206 X10*3/UL (ref 150–450)
POTASSIUM SERPL-SCNC: 4.5 MMOL/L (ref 3.4–5.1)
PROT UR STRIP.AUTO-MCNC: NEGATIVE MG/DL
RBC # BLD AUTO: 4.35 X10*6/UL (ref 4.5–5.9)
RBC # UR STRIP.AUTO: NEGATIVE /UL
SODIUM SERPL-SCNC: 137 MMOL/L (ref 133–145)
SP GR UR STRIP.AUTO: 1.02
UROBILINOGEN UR STRIP.AUTO-MCNC: NORMAL MG/DL
WBC # BLD AUTO: 5.1 X10*3/UL (ref 4.4–11.3)

## 2024-03-15 PROCEDURE — 36415 COLL VENOUS BLD VENIPUNCTURE: CPT

## 2024-03-15 PROCEDURE — 87081 CULTURE SCREEN ONLY: CPT | Mod: WESLAB

## 2024-03-15 PROCEDURE — 80048 BASIC METABOLIC PNL TOTAL CA: CPT

## 2024-03-15 PROCEDURE — 85025 COMPLETE CBC W/AUTO DIFF WBC: CPT

## 2024-03-15 PROCEDURE — 83036 HEMOGLOBIN GLYCOSYLATED A1C: CPT

## 2024-03-15 PROCEDURE — 81003 URINALYSIS AUTO W/O SCOPE: CPT

## 2024-03-15 PROCEDURE — 73200 CT UPPER EXTREMITY W/O DYE: CPT | Mod: LT

## 2024-03-15 PROCEDURE — 99204 OFFICE O/P NEW MOD 45 MIN: CPT | Performed by: PHYSICIAN ASSISTANT

## 2024-03-15 RX ORDER — IBUPROFEN 200 MG
200 TABLET ORAL EVERY 6 HOURS PRN
COMMUNITY

## 2024-03-15 RX ORDER — CHLORHEXIDINE GLUCONATE ORAL RINSE 1.2 MG/ML
15 SOLUTION DENTAL AS NEEDED
Qty: 120 ML | Refills: 0 | Status: SHIPPED | OUTPATIENT
Start: 2024-03-28 | End: 2024-04-05

## 2024-03-15 ASSESSMENT — DUKE ACTIVITY SCORE INDEX (DASI)
TOTAL_SCORE: 24.2
CAN YOU WALK A BLOCK OR TWO ON LEVEL GROUND: YES
CAN YOU WALK INDOORS, SUCH AS AROUND YOUR HOUSE: YES
CAN YOU PARTICIPATE IN STRENOUS SPORTS LIKE SWIMMING, SINGLES TENNIS, FOOTBALL, BASKETBALL, OR SKIING: NO
CAN YOU TAKE CARE OF YOURSELF (EAT, DRESS, BATHE, OR USE TOILET): YES
CAN YOU CLIMB A FLIGHT OF STAIRS OR WALK UP A HILL: YES
CAN YOU WALK A BLOCK OR TWO ON LEVEL GROUND: YES
CAN YOU RUN A SHORT DISTANCE: NO
CAN YOU RUN A SHORT DISTANCE: NO
CAN YOU DO HEAVY WORK AROUND THE HOUSE LIKE SCRUBBING FLOORS OR LIFTING AND MOVING HEAVY FURNITURE: NO
CAN YOU DO MODERATE WORK AROUND THE HOUSE LIKE VACUUMING, SWEEPING FLOORS OR CARRYING GROCERIES: YES
CAN YOU DO YARD WORK LIKE RAKING LEAVES, WEEDING OR PUSHING A MOWER: NO
CAN YOU PARTICIPATE IN MODERATE RECREATIONAL ACTIVITIES LIKE GOLF, BOWLING, DANCING, DOUBLES TENNIS OR THROWING A BASEBALL OR FOOTBALL: NO
CAN YOU DO YARD WORK LIKE RAKING LEAVES, WEEDING OR PUSHING A MOWER: YES
CAN YOU DO MODERATE WORK AROUND THE HOUSE LIKE VACUUMING, SWEEPING FLOORS OR CARRYING GROCERIES: YES
CAN YOU DO LIGHT WORK AROUND THE HOUSE LIKE DUSTING OR WASHING DISHES: YES
CAN YOU CLIMB A FLIGHT OF STAIRS OR WALK UP A HILL: YES
CAN YOU TAKE CARE OF YOURSELF (EAT, DRESS, BATHE, OR USE TOILET): YES
CAN YOU DO HEAVY WORK AROUND THE HOUSE LIKE SCRUBBING FLOORS OR LIFTING AND MOVING HEAVY FURNITURE: NO
CAN YOU WALK INDOORS, SUCH AS AROUND YOUR HOUSE: YES
CAN YOU HAVE SEXUAL RELATIONS: YES
DASI METS SCORE: 5.7
CAN YOU DO LIGHT WORK AROUND THE HOUSE LIKE DUSTING OR WASHING DISHES: YES
CAN YOU PARTICIPATE IN MODERATE RECREATIONAL ACTIVITIES LIKE GOLF, BOWLING, DANCING, DOUBLES TENNIS OR THROWING A BASEBALL OR FOOTBALL: NO
CAN YOU PARTICIPATE IN STRENOUS SPORTS LIKE SWIMMING, SINGLES TENNIS, FOOTBALL, BASKETBALL, OR SKIING: NO

## 2024-03-15 ASSESSMENT — LIFESTYLE VARIABLES: SMOKING_STATUS: SMOKER

## 2024-03-15 ASSESSMENT — CHADS2 SCORE
AGE GREATER THAN OR EQUAL TO 75: NO
PRIOR STROKE OR TIA OR THROMBOEMBOLISM: NO
HYPERTENSION: NO
CHADS2 SCORE: 0
DIABETES: NO
CHF: NO

## 2024-03-15 ASSESSMENT — PAIN DESCRIPTION - DESCRIPTORS: DESCRIPTORS: ACHING;BURNING;STABBING;SHARP

## 2024-03-15 ASSESSMENT — PAIN - FUNCTIONAL ASSESSMENT: PAIN_FUNCTIONAL_ASSESSMENT: 0-10

## 2024-03-15 ASSESSMENT — PAIN SCALES - GENERAL: PAINLEVEL_OUTOF10: 5 - MODERATE PAIN

## 2024-03-15 ASSESSMENT — ENCOUNTER SYMPTOMS
BACK PAIN: 1
MYALGIAS: 1

## 2024-03-15 NOTE — PREPROCEDURE INSTRUCTIONS
Medication List            Accurate as of March 15, 2024 11:18 AM. Always use your most recent med list.                * amantadine 100 mg tablet  Commonly known as: Symmetrel  First week take one tablet daily then take one tablet twice a day  Medication Adjustments for Surgery: Take morning of surgery with sip of water, no other fluids     * amantadine 100 mg tablet  Commonly known as: Symmetrel  One tablet twice a day  Medication Adjustments for Surgery: Other (Comment)  Notes to patient: Patient is only taking once per day, as of now     amitriptyline 10 mg tablet  Commonly known as: Elavil  Take 1 tablet (10 mg) by mouth once daily at bedtime.  Medication Adjustments for Surgery: Other (Comment)  Notes to patient: Take bedtime dose, as normal     atorvastatin 20 mg tablet  Commonly known as: Lipitor  Take 1 tablet (20 mg) by mouth once daily.  Medication Adjustments for Surgery: Take morning of surgery with sip of water, no other fluids     chlorhexidine 0.12 % solution  Commonly known as: Peridex  Use 15 mL in the mouth or throat if needed for wound care for up to 2 doses. Use cap to measure 15 mL.  Swish/gargle mouthwash for at least 30 seconds.  Do not swallow.  Use night before surgery after brushing teeth and morning of surgery after brushing teeth. Do not start before March 28, 2024.  Start taking on: March 28, 2024     escitalopram 5 mg tablet  Commonly known as: Lexapro  Take 1 tablet (5 mg) by mouth once daily.  Medication Adjustments for Surgery: Take morning of surgery with sip of water, no other fluids     gabapentin 400 mg capsule  Commonly known as: Neurontin  To be filled by Provider  Medication Adjustments for Surgery: Take morning of surgery with sip of water, no other fluids     ibuprofen 200 mg tablet  Medication Adjustments for Surgery: Stop 7 days before surgery     ketamine 4 mg/mL swish & spit solution  Commonly known as: Ketalar  Medication Adjustments for Surgery: Other  (Comment)  Notes to patient: If needed     Pump Patient Supplied Medication   Medication Adjustments for Surgery: Other (Comment)  Notes to patient: Continue as normal, inform anesthesia day of surgery     topiramate 100 mg tablet  Commonly known as: Topamax  Take 1 tablet (100 mg) by mouth 2 times a day.  Medication Adjustments for Surgery: Take morning of surgery with sip of water, no other fluids     traZODone 150 mg tablet  Commonly known as: Desyrel  Take 1 tablet (150 mg) by mouth as needed at bedtime for sleep.  Medication Adjustments for Surgery: Other (Comment)  Notes to patient: Take bedtime dose, as normal           * This list has 2 medication(s) that are the same as other medications prescribed for you. Read the directions carefully, and ask your doctor or other care provider to review them with you.                                  PAT DISCHARGE INSTRUCTIONS    Please call the Same Day Surgery (SDS) Department of the hospital where your procedure will be performed after 2:00 PM the day before your surgery. If you are scheduled on a Monday, or a Tuesday following a Monday holiday, you will need to call on the last business day prior to your surgery.    [facility]  [id number] [place] [address], [address] [phone]    [facility]  [address] [place] [address] [phone]    [address] [place] [address]  [phone]    Please let your surgeon know if:      You develop any open sores, shingles, burning or painful urination as these may increase your risk of an infection.   You no longer wish to have the surgery.   Any other personal circumstances change that may lead to the need to cancel or defer this surgery-such as being sick or getting admitted to any hospital within one week of your planned procedure.    Your contact details change, such as a change of address  or phone number.    Starting now:     Please DO NOT drink alcohol or smoke for 24 hours before surgery. It is well known that quitting smoking can make a huge difference to your health and recovery from surgery. The longer you abstain from smoking, the better your chances of a healthy recovery. If you need help with quitting, call 1-800-QUIT-NOW to be connected to a trained counselor who will discuss the best methods to help you quit.     Before your surgery:    Please stop all supplements 7 days prior to surgery. Or as directed by your surgeon.   Please stop taking NSAID pain medicine such as Advil and Motrin 7 days before surgery.    If you develop any fever, cough, cold, rashes, cuts, scratches, scrapes, urinary symptoms or infection anywhere on your body (including teeth and gums) prior to surgery, please call your surgeon’s office as soon as possible. This may require treatment to reduce the chance of cancellation on the day of surgery.    The day before your surgery:   DIET- Please follow the diet instructions at the top of your packet.   Get a good night’s rest.  Use the special soap for bathing if you have been instructed to use one.    Scheduled surgery times may change and you will be notified if this occurs - please check your personal voicemail for any updates.     On the morning of surgery:   Wear comfortable, loose fitting clothes which open in the front. Please do not wear moisturizers, creams, lotions, makeup or perfume.    Please bring with you to surgery:   Photo ID and insurance card   Current list of medicines and allergies   Pacemaker/ Defibrillator/Heart stent cards   CPAP machine and mask    Slings/ splints/ crutches   A copy of your complete advanced directive/DHPOA.    Please do NOT bring with you to surgery:   All jewelry and valuables should be left at home.   Prosthetic devices such as contact lenses, hearing aids, dentures, eyelash extensions, hairpins and body piercings must be removed  prior to going in to the surgical suite.    After outpatient surgery:   A responsible adult MUST accompany you at the time of discharge and stay with you for 24 hours after your surgery. You may NOT drive yourself home after surgery.    Do not drive, operate machinery, make critical decisions or do activities that require co-ordination or balance until after a night’s sleep.   Do not drink alcoholic beverages for 24 hours.   Instructions for resuming your medications will be provided by your surgeon.    CALL YOUR DOCTOR AFTER SURGERY IF YOU HAVE:     Chills and/or a fever of 101° F or higher.    Redness, swelling, pus or drainage from your surgical wound or a bad smell from the wound.    Lightheadedness, fainting or confusion.    Persistent vomiting (throwing up) and are not able to eat or drink for 12 hours.    Three or more loose, watery bowel movements in 24 hours (diarrhea).   Difficulty or pain while urinating( after non-urological surgery)    Pain and swelling in your legs, especially if it is only on one side.    Difficulty breathing or are breathing faster than normal.    Any new concerning symptoms. Home Preoperative Antibacterial Shower    What is a home preoperative antibacterial shower?  This shower is a way of cleaning the skin with a germ killing solution before surgery. The solution contains chlorhexidine, commonly known as CHG. CHG is a skin cleanser with germ killing ability. Let your doctor know if you are allergic to chlorhexidine.      Why do I need to take a preoperative antibacterial shower?  Skin is not sterile. It is best to try to make your skin as free of germs as possible before surgery. Proper cleansing with a germ killing soap before surgery can lower the number of germs on your skin. This helps to reduce the risk of infection at the surgical site. Following the instructions listed below will help you prepare your skin for surgery.    How do I use the solution?      Steps: Begin using  your CHG soap FIVE DAYS BEFORE your scheduled surgery on __________________________________________________.  First, wash and rinse your hair using the CHG soap. Keep CHG away soap away from ear canals and eyes.  Rinse completely, do not condition. Hair extensions should be removed.  Wash your face with your normal soap and rinse.  Apply the CHG solution to a clean wet washcloth. Turn the water off or move away from the water spray to avoid premature rinsing of the CHG soap as you are applying.  Firmly lather your entire body from neck down. Do not use on face.  Pay special attention to the areas(s) where your incision(s) will be located unless they are on your face.  Avoid scrubbing your skin too hard.  The important point is to have the CHG soap sit on your skin for 3 minutes.  DO NOT wash with regular soap after you have used the CHG soap solution.  Pat yourself dry with a clean, freshly laundered towel.  DO NOT apply powders, deodorants or lotions.  Dress in clean, freshly laundered night clothes.  Be sure to sleep with clean, freshly laundered sheets.  Be aware that CHG will cause stains on fabrics; if you wash them with bleach after use. Rinse your washcloth and other linens that have contact with CHG completely. Use only non-chlorine detergents to launder the items used.  The morning of surgery is the fifth day. Repeat the above steps and dress in clean comfortable clothing.      Who should I call if I have any questions regarding the use of CHG soap?  Call the [facility] at [phone] if you have any questions. Patient Information: Oral/Dental Rinse    What is oral/dental rinse?  It is a mouthwash. It is a way of cleaning the mouth with a germ killing solution before your surgery. The solution contains chlorhexidine, commonly know as CHG.  It is used inside the mouth to kill bacteria known as Staphylococcus aureus.  Let your doctor know  if you are allergic to chlorhexidine.    Why do I need to use CHG oral/dental rinse?  The CHG oral/dental rinse helps to kill bacteria in your mouth known as Staphylococcus aureus. This reduces the risk of infection at the surgical site.    Using your CHG oral/dental rinse.  STEPS: use your CHG oral/dental rinse after you brush your teeth the night before (at bedtime) and the morning of your surgery. Follow the directions on your prescription label.  Use the cap on the container to measure 15ml (fill cap to fill line)  Swish (gargle if you can) the mouthwash in your mouth for at least 30 seconds, (do not swallow) spit out.  After you use your CHG rinse, do not rinse your mouth with water, drink or eat. Please refer to prescription label for the appropriate time to resume oral intake.    What side effects might I have using the CHG oral/dental rinse?  CHG rinse will stick to the plaque on the teeth. Brush and floss just before use. Teeth brushing will help avoid staining of plaque during use.    Who should I contact if I have questions about the CHG oral/dental rinse?  Please call [facility], [facility] for Per[facility] at [phone] if you have any questions.    INFORM ANESTHESIA OF PAIN PUMP DAY OF SURGERY

## 2024-03-15 NOTE — CPM/PAT H&P
CPM/PAT Evaluation       Name: Davi TORRES Sun (Davi BRIAN Sun)  /Age: 1955/68 y.o.     In-Person       Chief Complaint: Left shoulder pain    HPI 68-year-old male complains of left shoulder pain and decreased range of motion.  Patient states he has issues with both shoulders but he has better range of motion in the right shoulder.  Has movement disorder and is currently being worked up to rule out Parkinson's versus Benny's chorea.  He has a history of depression, fibromyalgia.  He is a daily smoker.  Patient is scheduled for left total shoulder replacement 2024    Past Medical History:   Diagnosis Date    Arthritis     BPH with elevated PSA     Current every day smoker     Depression     Fibromyalgia     GERD (gastroesophageal reflux disease)     History of peritonitis     Hyperlipidemia     Insomnia     Movement disorder     Rule out Huntley's chorea versus Parkinson's    Osteoporosis     Postlaminectomy syndrome     TIA (transient ischemic attack)        Past Surgical History:   Procedure Laterality Date    BACK SURGERY      X 3    COLONOSCOPY W/ BIOPSIES      MEDIPORT INSERTION, SINGLE      Has not been flushed or used in 14 years    PENILE PROSTHESIS IMPLANT      SHOULDER ARTHROSCOPY Bilateral     SPINAL CORD STIMULATOR IMPLANT      X 2.  Removal for infection both times    TOTAL KNEE ARTHROPLASTY Left        Patient  has no history on file for sexual activity.    Family History   Problem Relation Name Age of Onset    Dementia Mother      Mental illness Mother      Heart attack Father      Diabetes Father      Other (brain tumor) Father      Heart disease Father      Cancer Maternal Grandfather      Leukemia Other      Pancreatic cancer Other         Allergies   Allergen Reactions    Adhesive Tape-Silicones Unknown     Reactions: Blistering    Cephalexin Unknown    Clindamycin Unknown    Linezolid Unknown    Adhesive Rash     water blisters    Penicillins Rash and Swelling      chest swelling       Current Outpatient Medications   Medication Instructions    amantadine (Symmetrel) 100 mg tablet First week take one tablet daily then take one tablet twice a day    amantadine (Symmetrel) 100 mg tablet One tablet twice a day    amitriptyline (ELAVIL) 10 mg, oral, Nightly    atorvastatin (LIPITOR) 20 mg, oral, Daily    [START ON 3/28/2024] chlorhexidine (Peridex) 0.12 % solution 15 mL, Mouth/Throat, As needed, Use cap to measure 15 mL.  Swish/gargle mouthwash for at least 30 seconds.  Do not swallow.  Use night before surgery after brushing teeth and morning of surgery after brushing teeth.    escitalopram (LEXAPRO) 5 mg, oral, Daily    gabapentin (Neurontin) 400 mg capsule To be filled by Provider    ibuprofen 200 mg, oral, Every 6 hours PRN    ketamine (Ketalar) 4 mg/mL swish & spit solution 10 mL, Swish & Spit, Once, Swish and swallow prn    Pump Patient Supplied Medication subcutaneous, Continuous, Patient Own Pump<BR><BR>Meds: morphine and lidocaine<BR>Provider name/ #: <BR>Pump name: [facility]<BR>Last & next fill date: next fill date is the 1st week in April    topiramate (TOPAMAX) 100 mg, oral, 2 times daily    traZODone (DESYREL) 150 mg, oral, Nightly PRN     Review of Systems   Musculoskeletal:  Positive for back pain and myalgias.   Neurological:         Arm/shoulder jerking and tongue rolling   All other systems reviewed and are negative.       Physical Exam  Vitals reviewed. Physical exam within normal limits.   Constitutional:       Appearance: Normal appearance.   HENT:      Head: Normocephalic and atraumatic.      Mouth/Throat:      Mouth: Mucous membranes are moist.   Eyes:      Extraocular Movements: Extraocular movements intact.      Pupils: Pupils are equal, round, and reactive to light.   Neck:      Vascular: No carotid bruit.   Cardiovascular:      Rate and Rhythm: Normal rate and regular rhythm.      Pulses: Normal pulses.      Heart sounds: Normal heart sounds.  "  Pulmonary:      Effort: Pulmonary effort is normal.      Breath sounds: Normal breath sounds.   Abdominal:      General: Abdomen is flat. Bowel sounds are normal.      Palpations: Abdomen is soft.      Comments: Pain pump device left lower quadrant   Musculoskeletal:         General: Normal range of motion.      Cervical back: Normal range of motion.      Comments: Mediport right chest   Lymphadenopathy:      Cervical: No cervical adenopathy.   Skin:     General: Skin is warm and dry.   Neurological:      General: No focal deficit present.      Mental Status: He is alert and oriented to person, place, and time.   Psychiatric:         Mood and Affect: Mood normal.         Behavior: Behavior normal.         Thought Content: Thought content normal.         Judgment: Judgment normal.          PAT AIRWAY:   Airway:     Mallampati::  III   upper dentures      Vitals  Heart Rate:  [73]   Temp:  [37 °C (98.6 °F)]   BP: (128)/(80)   Height:  [175.3 cm (5' 9\")]   Weight:  [61.2 kg (134 lb 14.4 oz)]   SpO2:  [99 %]        DASI Risk Score      Flowsheet Row Most Recent Value   DASI SCORE 24.2   METS Score (Will be calculated only when all the questions are answered) 5.7          Caprini DVT Assessment      Flowsheet Row Most Recent Value   DVT Score 21   Current Status Central venous access, Varicose veins, Swollen legs, Major surgery planned, lasting over 3 hours   History Prior major surgery, SVT, DVT/PE, Stroke   Age 60-75 years   BMI 30 or less          Modified Frailty Index    No data to display       CHADS2 Stroke Risk         N/A 3 - 100%: High Risk   2 - 3%: Medium Risk   0 - 2%: Low Risk     Last Change: N/A          This score determines the patient's risk of having a stroke if the patient has atrial fibrillation.        This score is not applicable to this patient. Components are not calculated.          Revised Cardiac Risk Index      Flowsheet Row Most Recent Value   Revised Cardiac Risk Calculator 0      "     Apfel Simplified Score      Flowsheet Row Most Recent Value   Apfel Simplified Score Calculator 0          Risk Analysis Index Results This Encounter    No data found in the last 1 encounters.       Stop Bang Score      Flowsheet Row Most Recent Value   Do you snore loudly? 0   Do you often feel tired or fatigued after your sleep? 0   Has anyone ever observed you stop breathing in your sleep? 0   Do you have or are you being treated for high blood pressure? 0   Recent BMI (Calculated) 19.9   Is BMI greater than 35 kg/m2? 0=No   Age older than 50 years old? 1=Yes   Is your neck circumference greater than 17 inches (Male) or 16 inches (Female)? 0   Gender - Male 1=Yes   STOP-BANG Total Score 2            Assessment and Plan:   1.  Left rotator cuff arthropathy   Plan: Left total shoulder replacement 03/29/2024  2.  [facility]ment disorder [facility]  3.  Postlaminectomy syndrome- has pain pump  4.  GERD  5.  Daily smoker  6.  ASA 3       Caprini DVT score 21       Stop bang total score 2       CHADS2 score 0       DASI score 24.2       METS score 5.7       RCRI score 0       Apfel score 0  7.  CBC, BMP, UA, MRSA, A1c ordered today

## 2024-03-17 LAB — STAPHYLOCOCCUS SPEC CULT: ABNORMAL

## 2024-03-18 LAB — SCAN RESULT: NORMAL

## 2024-03-19 ENCOUNTER — HOSPITAL ENCOUNTER (OUTPATIENT)
Dept: RADIOLOGY | Facility: HOSPITAL | Age: 69
Discharge: HOME | End: 2024-03-19
Payer: MEDICARE

## 2024-03-19 VITALS
BODY MASS INDEX: 19.26 KG/M2 | WEIGHT: 130 LBS | HEIGHT: 69 IN | TEMPERATURE: 98 F | OXYGEN SATURATION: 96 % | RESPIRATION RATE: 18 BRPM | DIASTOLIC BLOOD PRESSURE: 64 MMHG | HEART RATE: 58 BPM | SYSTOLIC BLOOD PRESSURE: 101 MMHG

## 2024-03-19 DIAGNOSIS — M96.1 POST LAMINECTOMY SYNDROME: ICD-10-CM

## 2024-03-19 PROCEDURE — 62323 NJX INTERLAMINAR LMBR/SAC: CPT | Performed by: ANESTHESIOLOGY

## 2024-03-19 PROCEDURE — 62368 ANALYZE SP INF PUMP W/REPROG: CPT | Performed by: ANESTHESIOLOGY

## 2024-03-19 PROCEDURE — 2720000007 HC OR 272 NO HCPCS

## 2024-03-19 PROCEDURE — 2500000004 HC RX 250 GENERAL PHARMACY W/ HCPCS (ALT 636 FOR OP/ED): Performed by: ANESTHESIOLOGY

## 2024-03-19 RX ORDER — MIDAZOLAM HYDROCHLORIDE 1 MG/ML
INJECTION INTRAMUSCULAR; INTRAVENOUS
Status: COMPLETED | OUTPATIENT
Start: 2024-03-19 | End: 2024-03-19

## 2024-03-19 RX ADMIN — MIDAZOLAM HYDROCHLORIDE 2 MG: 1 INJECTION INTRAMUSCULAR; INTRAVENOUS at 10:36

## 2024-03-19 ASSESSMENT — PAIN - FUNCTIONAL ASSESSMENT
PAIN_FUNCTIONAL_ASSESSMENT: 0-10

## 2024-03-19 ASSESSMENT — PAIN SCALES - GENERAL
PAINLEVEL_OUTOF10: 9
PAINLEVEL_OUTOF10: 5 - MODERATE PAIN
PAINLEVEL_OUTOF10: 4
PAINLEVEL_OUTOF10: 9
PAINLEVEL_OUTOF10: 5 - MODERATE PAIN

## 2024-03-19 NOTE — PROCEDURES
Pre-Op Diagnosis: Lumbar postlaminectomy syndrome   Post-Procedure Diagnosis: Same as preop diagnosis  Procedure: Caudal epidural steroid injection with the help of a Racz catheter  Surgeon: Darian Wing MD, PhD   Resident/Fellow/Other Assistant: Stanford Cramer MD     Procedure Note:     Mr. Davi Garsia is a 68 y.o. male with history of previous lumbar laminectomy with fusion status post intrathecal pump implant presents today for caudal epidural steroid injection with the help of a Racz catheter for lumbosacral radicular pain.  He has had a similar injection in the past with good relief.  He is also asking for adjustment in his intrathecal drug delivery program.    Following informed consent, the patient was brought to the operating room and placed in the prone position.  The low back area was prepped with chlorhexidine and draped in the usual sterile fashion.  The skin and subcutaneous tissue overlying the needle trajectory to the sacral hiatus was anesthetized with a total of 4 cc 0.5% lidocaine.  A 16-gauge Coude needle was then advanced under fluoroscopic guidance into the lower sacral canal.  A Racz catheter was then advanced through the needle to the L5-S1 junction where slight resistance to catheter advancement was met.  Injection of iohexol contrast revealed appropriate epidural spread.  Injection of more contrast with catheter manipulation resulted in better contrast spread.  Thereafter, 11 cc 0.5% lidocaine and 40 mg methylprednisolone injected through the catheter and then the catheter and needle were removed together.  The patient tolerated the procedure well and was transferred to the recovery room in stable condition.      In the recovery area, the intrathecal pump was interrogated.  It is delivering a solution of morphine 1.5 mg/mL and bupivacaine 30 mg/mL.  The continuous infusion rate was increased by 10% to 0.4121 mg of morphine per day corresponding to 8.243 mg of bupivacaine per  day.  The patient administered PTM boluses were kept the same at 50 mcg morphine corresponding to 1 mg bupivacaine with 1 hour lockout and a maximum of 12 boluses per day.  The current reservoir volume is 18.7 mL.    The patient will update us on the response to the injection over the next few days.

## 2024-03-19 NOTE — H&P
HISTORY AND PHYSICAL    History Of Present Illness  Davi Garsia is a 68 y.o. male presenting with chronic pain.  Here for Caudal epidural steroid injection    he denies any recent antibiotic use or infections, he denies any blood thinner use , and he denies contrast or local anesthetic allergies     Past Medical History  Past Medical History:   Diagnosis Date    Arthritis     BPH with elevated PSA     Current every day smoker     Depression     Fibromyalgia     GERD (gastroesophageal reflux disease)     History of peritonitis     Hyperlipidemia     Insomnia     Movement disorder     Rule out Windham's chorea versus Parkinson's    Osteoporosis     Postlaminectomy syndrome     TIA (transient ischemic attack) 2005       Surgical History  Past Surgical History:   Procedure Laterality Date    BACK SURGERY      X 3    CHOLECYSTECTOMY  2022    COLONOSCOPY W/ BIOPSIES      ESOPHAGOGASTRODUODENOSCOPY      MEDIPORT INSERTION, SINGLE      Has not been flushed or used in 14 years    PENILE PROSTHESIS IMPLANT      SHOULDER ARTHROSCOPY Bilateral     SPINAL CORD STIMULATOR IMPLANT      X 2.  Removal for infection both times    TOTAL KNEE ARTHROPLASTY Left         Social History  He reports that he has been smoking cigarettes. He started smoking about 52 years ago. He has a 25.50 pack-year smoking history. He has been exposed to tobacco smoke. He has never used smokeless tobacco. He reports that he does not drink alcohol and does not use drugs.    Family History  Family History   Problem Relation Name Age of Onset    Dementia Mother      Mental illness Mother      Heart attack Father      Diabetes Father      Other (brain tumor) Father      Heart disease Father      Cancer Maternal Grandfather      Leukemia Other      Pancreatic cancer Other          Allergies  Adhesive tape-silicones, Cephalexin, Clindamycin, Linezolid, Adhesive, and Penicillins    Review of Systems   12 point ROS done and negative except for the above.  "  Physical Exam     General: NAD, well groomed, well nourished  Eyes: Non-icteric sclera, EOMI  Ears, Nose, Mouth, and Throat: External ears and nose appear to be without deformity or rash. No lesions or masses noted. Hearing is grossly intact.   Neck: Trachea midline  Respiratory: Nonlabored breathing   Cardiovascular: No peripheral edema   Skin: No rashes or open lesions/ulcers identified on skin.    Last Recorded Vitals  Blood pressure 126/66, pulse 59, temperature 36.6 °C (97.9 °F), temperature source Temporal, resp. rate 18, height 1.753 m (5' 9\"), weight 59 kg (130 lb), SpO2 100 %.    Relevant Results           Assessment/Plan       Risks, benefits, alternatives discussed. All questions answered to the best of my ability. Patient agrees to proceed.   -We will proceed with planned procedure        Chang Cramer MD  Chronic Pain Fellow  [facility]    "

## 2024-03-21 DIAGNOSIS — G47.9 SLEEP DISTURBANCES: ICD-10-CM

## 2024-03-22 RX ORDER — TRAZODONE HYDROCHLORIDE 150 MG/1
150 TABLET ORAL NIGHTLY PRN
Qty: 90 TABLET | Refills: 0 | Status: SHIPPED | OUTPATIENT
Start: 2024-03-22 | End: 2024-04-24 | Stop reason: SDUPTHER

## 2024-03-24 DIAGNOSIS — G62.9 NEUROPATHY: ICD-10-CM

## 2024-03-26 RX ORDER — GABAPENTIN 400 MG/1
800 CAPSULE ORAL 4 TIMES DAILY
Qty: 360 CAPSULE | Refills: 0 | Status: SHIPPED | OUTPATIENT
Start: 2024-03-26 | End: 2024-04-24 | Stop reason: SDUPTHER

## 2024-03-28 ENCOUNTER — OFFICE VISIT (OUTPATIENT)
Dept: GENETICS | Facility: CLINIC | Age: 69
End: 2024-03-28
Payer: MEDICARE

## 2024-03-28 VITALS
DIASTOLIC BLOOD PRESSURE: 81 MMHG | HEIGHT: 66 IN | HEART RATE: 63 BPM | SYSTOLIC BLOOD PRESSURE: 126 MMHG | WEIGHT: 136.47 LBS | BODY MASS INDEX: 21.93 KG/M2

## 2024-03-28 DIAGNOSIS — R29.898 LEFT ARM WEAKNESS: ICD-10-CM

## 2024-03-28 DIAGNOSIS — G25.5 CHOREA: Primary | ICD-10-CM

## 2024-03-28 PROCEDURE — 1160F RVW MEDS BY RX/DR IN RCRD: CPT | Performed by: MEDICAL GENETICS

## 2024-03-28 PROCEDURE — 1125F AMNT PAIN NOTED PAIN PRSNT: CPT | Performed by: MEDICAL GENETICS

## 2024-03-28 PROCEDURE — 1159F MED LIST DOCD IN RCRD: CPT | Performed by: MEDICAL GENETICS

## 2024-03-28 PROCEDURE — 99214 OFFICE O/P EST MOD 30 MIN: CPT | Performed by: MEDICAL GENETICS

## 2024-03-28 ASSESSMENT — ENCOUNTER SYMPTOMS
OCCASIONAL FEELINGS OF UNSTEADINESS: 0
LOSS OF SENSATION IN FEET: 0
DEPRESSION: 0

## 2024-03-28 ASSESSMENT — PAIN SCALES - GENERAL: PAINLEVEL: 5

## 2024-03-28 NOTE — LETTER
03/30/24    Alcira Wooten MD  [address]  [place] [address]      Dear Dr. Alcira Wooten MD,    I am writing to confirm that your patient, Davi Garsia  received care in my office on 03/30/24. I have enclosed a summary of the care provided to Davi for your reference.    Please contact me with any questions you may have regarding the visit.    Sincerely,         Nikkie Walter MD  [address]  [facility] [address]  [facility] [id number]    CC: No Recipients

## 2024-03-28 NOTE — PROGRESS NOTES
"Subjective   Patient ID: Davi Garsia is a 68 y.o. male who presents for a follow up visit.     HPI  Mr. Garsia returns with his wife to receive the results of genetic testing for Mon[place] disease (HD). Results were NEGATIVE, so Mr. Garsia does not have HD and is not at risk to develop HD.  He does not have a family history of HD or a similar condition.    HTT gene testing (Benny disease repeat expansion analysis):  Allele 1: 16 repeats  Allele 2: 22 repeats    This testing was performed at [facility], with a report date of 3/14/2024.     Interval History:  Currently taking amantadine, due to prohibitive cost of [employer] including the generic.   He finds the the amantadine has improved his tongue movement and jerky movements in arms and legs although causes him to slur his words and his thinking is slowed.     He wakes up in the morning with numb hands/reduced sensation.    He has left hand weakness and struggles to  a cup of water with that hand.   For these two reasons, he was referred to neuromuscular neurology and saw Dr. Donte Treadwell, as below.     Recent EKG was normal, ordered by Dr. Block.     He last saw Dr. Treadwell on 2/19.   Per Dr. Treadwell, \"On exam he has weakness in left arm abduction along with weakness in flexion, extension and abduction of the fingers. There is patchy sensory loss throughout the lateral forearm and the hand, but there is some inconsistencies on testing. An EMG is warranted for further evaluation as the differential includes a cervical radicular radiculopathy and brachial plexopathy. An EMG is ordered; however, he is undergoing left shoulder replacement in a few weeks and we will hold off until he has recovered from the surgery.   I will follow-up with the patient after his EMG.\"      Assessment/Plan     It was a pleasure to see you again today.     Your testing was negative (NORMAL) for Monongalia disease, with 16 CAG repeats (normal) and 22 CAG " "repeats on the 2 copies of the HTT gene related to HD.    You do not have HD and are not at risk to develop HD.  Your son is NOT at an increased risk to inherit HD, unless his other parent has a family history of HD. If you have a different genetic condition (i.e. a condition that is not HD), you have not been tested for it, so it could still be possible for a child to inherit that.     You asked about Parkinson disease, a diagnosis under consideration for you according to movement disorders neurology notes.  (You have not been diagnosed with Parkinson disease, although you have some overlapping symptoms, and this has been noted as \"mild parkinsonism.\"  Parkinsonism is different from Parkinson disease and has a variety of causes.)  With no family history of Parkinson disease or young onset of symptoms, I would not recommend genetic testing even if you were given a diagnosis of Parkinson disease in the future.      At this time, it is unclear whether your symptoms (mainly your movement disorder symptoms) could be related to a genetic disorder.  I would be interested in Dr. Roldan's re-evaluation of you now that you are taking amantadine.  As we discussed, if he is still concerned about a possible genetic cause, the next step in testing would involve much broader genetic testing than the test you just had.   I would also be interested in your EMG results.     Plan:  I will discuss with Dr. Bridger Roldan to confirm the timing of follow up with him.   I would like you to see Dr. Roldan again before considering any other genetic testing, and will await his assessment.  My office will call your wife, [phone], with an update about follow-up.  Please call Dr. Treadwell when you are ready to have an EMG.   I will mail you this note. For issues and questions related to [facility], you can contact the [facility] Patient Support Line 24/7 at [phone].   If you need to return to genetics, you are willing to have a " virtual visit.   Otherwise, [facility], where you were seen today, would be your preferred location.       If you have any questions, please call Sharmaine Montero in the [facility] at [phone] option 1 or send me a non-urgent message through [employer].     Nikkie Walter MD  Clinical       Visit Time: 1:22 - 1:55    Documentation Time: 2:09 - 2:13    Scribe Attestation  By signing my name below, I, Justyna Groves , Scribe   attest that this documentation has been prepared under the direction and in the presence of Nikkie Walter MD.

## 2024-03-29 ENCOUNTER — ANESTHESIA (OUTPATIENT)
Dept: OPERATING ROOM | Facility: HOSPITAL | Age: 69
End: 2024-03-29
Payer: MEDICARE

## 2024-03-29 ENCOUNTER — HOSPITAL ENCOUNTER (OUTPATIENT)
Facility: HOSPITAL | Age: 69
Setting detail: OUTPATIENT SURGERY
Discharge: HOME | End: 2024-03-29
Attending: ORTHOPAEDIC SURGERY | Admitting: ORTHOPAEDIC SURGERY
Payer: MEDICARE

## 2024-03-29 ENCOUNTER — ANESTHESIA EVENT (OUTPATIENT)
Dept: OPERATING ROOM | Facility: HOSPITAL | Age: 69
End: 2024-03-29

## 2024-03-29 VITALS
RESPIRATION RATE: 16 BRPM | OXYGEN SATURATION: 99 % | SYSTOLIC BLOOD PRESSURE: 118 MMHG | TEMPERATURE: 100.4 F | DIASTOLIC BLOOD PRESSURE: 68 MMHG | HEART RATE: 78 BPM

## 2024-03-29 LAB — SARS-COV-2 RNA RESP QL NAA+PROBE: NOT DETECTED

## 2024-03-29 PROCEDURE — 87635 SARS-COV-2 COVID-19 AMP PRB: CPT | Performed by: ANESTHESIOLOGY

## 2024-03-29 RX ORDER — OXYCODONE HCL 10 MG/1
10 TABLET, FILM COATED, EXTENDED RELEASE ORAL ONCE
Status: DISCONTINUED | OUTPATIENT
Start: 2024-03-29 | End: 2024-03-29 | Stop reason: HOSPADM

## 2024-03-29 RX ORDER — ACETAMINOPHEN 500 MG
500 TABLET ORAL ONCE
Status: DISCONTINUED | OUTPATIENT
Start: 2024-03-29 | End: 2024-03-29 | Stop reason: HOSPADM

## 2024-03-29 RX ORDER — CELECOXIB 200 MG/1
400 CAPSULE ORAL ONCE
Status: DISCONTINUED | OUTPATIENT
Start: 2024-03-29 | End: 2024-03-29 | Stop reason: HOSPADM

## 2024-03-29 RX ORDER — VANCOMYCIN HYDROCHLORIDE 1 G/20ML
1 INJECTION, POWDER, LYOPHILIZED, FOR SOLUTION INTRAVENOUS ONCE
Status: DISCONTINUED | OUTPATIENT
Start: 2024-03-29 | End: 2024-03-29 | Stop reason: HOSPADM

## 2024-03-29 RX ORDER — PREGABALIN 75 MG/1
75 CAPSULE ORAL ONCE
Status: DISCONTINUED | OUTPATIENT
Start: 2024-03-29 | End: 2024-03-29 | Stop reason: HOSPADM

## 2024-03-29 SDOH — HEALTH STABILITY: MENTAL HEALTH: CURRENT SMOKER: 1

## 2024-03-29 ASSESSMENT — COLUMBIA-SUICIDE SEVERITY RATING SCALE - C-SSRS
2. HAVE YOU ACTUALLY HAD ANY THOUGHTS OF KILLING YOURSELF?: NO
1. IN THE PAST MONTH, HAVE YOU WISHED YOU WERE DEAD OR WISHED YOU COULD GO TO SLEEP AND NOT WAKE UP?: NO
6. HAVE YOU EVER DONE ANYTHING, STARTED TO DO ANYTHING, OR PREPARED TO DO ANYTHING TO END YOUR LIFE?: NO

## 2024-03-29 ASSESSMENT — PAIN DESCRIPTION - DESCRIPTORS: DESCRIPTORS: ACHING;BURNING;THROBBING

## 2024-03-29 ASSESSMENT — PAIN - FUNCTIONAL ASSESSMENT: PAIN_FUNCTIONAL_ASSESSMENT: 0-10

## 2024-03-29 ASSESSMENT — PAIN SCALES - GENERAL: PAINLEVEL_OUTOF10: 5 - MODERATE PAIN

## 2024-03-29 NOTE — H&P
History Of Present Illness  Davi Garsia is a 68 y.o. male presenting with shoulder arthritis.     Past Medical History  He has a past medical history of Arthritis, BPH with elevated PSA, Current every day smoker, Depression, Fibromyalgia, GERD (gastroesophageal reflux disease), History of peritonitis, Hyperlipidemia, Insomnia, Movement disorder, Osteoporosis, Postlaminectomy syndrome, and TIA (transient ischemic attack) (2005).    Surgical History  He has a past surgical history that includes Back surgery; Penile prosthesis implant; Colonoscopy w/ biopsies; Total knee arthroplasty (Left); Mediport insertion, single; Spinal cord stimulator implant; Shoulder arthroscopy (Bilateral); Cholecystectomy (2022); and Esophagogastroduodenoscopy.     Social History  He reports that he has been smoking cigarettes. He started smoking about 52 years ago. He has a 25.50 pack-year smoking history. He has been exposed to tobacco smoke. He has never used smokeless tobacco. He reports that he does not drink alcohol and does not use drugs.    Family History  Family History   Problem Relation Name Age of Onset    Dementia Mother      Mental illness Mother      Heart attack Father      Diabetes Father      Other (brain tumor) Father      Heart disease Father      Cancer Maternal Grandfather      Leukemia Other      Pancreatic cancer Other          Allergies  Adhesive tape-silicones, Cephalexin, Clindamycin, Linezolid, Adhesive, and Penicillins    Review of Systems     Physical Exam     Last Recorded Vitals  Blood pressure 118/68, pulse 78, temperature 38 °C (100.4 °F), resp. rate 16, SpO2 99 %.    Relevant Results      Scheduled medications    Continuous medications    PRN medications    Results for orders placed or performed during the hospital encounter of 03/29/24 (from the past 24 hour(s))   Sars-CoV-2 PCR   Result Value Ref Range    Coronavirus 2019, PCR Not Detected Not Detected       Assessment/Plan   Active  Problems:  There are no active Hospital Problems.      Surgery cancelled due to fever  Rescheduled for next week.  Will obtain medical clearance       I spent 20 minutes in the professional and overall care of this patient.      Terrell Michelle MD

## 2024-03-29 NOTE — ANESTHESIA PREPROCEDURE EVALUATION
Patient: Davi Garsia    Procedure Information       Date/Time: 03/29/24 1045    Procedure: Arthroplasty Total Shoulder (Left: Shoulder) - ARTHREX    [facility]: [facility] / [facility]    Surgeons: Terrell Michelle MD        Low grade fever on admission this morning, we are testing for SARS CO V2 via np swabbing    Relevant Problems   Cardiac   (+) Mixed hyperlipidemia      Neuro   (+) Anxiety   (+) Mixed anxiety depressive disorder      GI   (+) GERD (gastroesophageal reflux disease)   (+) Ulcerative colitis (CMS/HCC)      /Renal   (+) BPH with elevated PSA      Liver   (+) Chronic cholecystitis with calculus      Endocrine   (+) Hypothyroidism      Musculoskeletal   (+) Fibromyalgia   (+) Knee osteoarthritis     Past Surgical History:   Procedure Laterality Date    BACK SURGERY      X 3    CHOLECYSTECTOMY  2022    COLONOSCOPY W/ BIOPSIES      ESOPHAGOGASTRODUODENOSCOPY      MEDIPORT INSERTION, SINGLE      Has not been flushed or used in 14 years    PENILE PROSTHESIS IMPLANT      SHOULDER ARTHROSCOPY Bilateral     SPINAL CORD STIMULATOR IMPLANT      X 2.  Removal for infection both times    TOTAL KNEE ARTHROPLASTY Left        Clinical information reviewed:   Tobacco  Allergies  Meds   Med Hx  Surg Hx   Fam Hx  Soc Hx        NPO Detail:  NPO/Void Status  Carbohydrate Drink Given Prior to Surgery? : N  Date of Last Liquid: 03/28/24  Time of Last Liquid: 2200  Date of Last Solid: 03/28/24  Time of Last Solid: 2000  Last Intake Type: Clear fluids  Time of Last Void: 0900         Physical Exam    Airway  Mallampati: II  TM distance: >3 FB  Neck ROM: full     Cardiovascular   Comments: deferred   Dental    Pulmonary   Comments: deferred   Abdominal     Comments: deferred           Anesthesia Plan    History of general anesthesia?: yes  History of complications of general anesthesia?: no    ASA 3     regional and MAC   (Left brachial plexus block)  The patient is a current smoker.  Patient was previously  instructed to abstain from smoking on day of procedure.  Patient did not smoke on day of procedure.  Education provided regarding risk of obstructive sleep apnea.  intravenous induction   Postoperative administration of opioids is intended.  Anesthetic plan and risks discussed with patient.    This patient is being rescheduled for next week due to new fever.  COVID test (PCR) is negative today.

## 2024-04-02 ENCOUNTER — TELEPHONE (OUTPATIENT)
Dept: GENETICS | Facility: CLINIC | Age: 69
End: 2024-04-02
Payer: MEDICARE

## 2024-04-02 PROBLEM — M79.89 SYMPTOM OF LEG SWELLING: Status: ACTIVE | Noted: 2024-04-02

## 2024-04-02 PROBLEM — G89.4 CHRONIC PAIN SYNDROME: Status: ACTIVE | Noted: 2022-06-22

## 2024-04-02 PROBLEM — Z86.19 HISTORY OF MUMPS: Status: ACTIVE | Noted: 2024-04-02

## 2024-04-02 PROBLEM — I82.439 ACUTE DEEP VEIN THROMBOSIS (DVT) OF POPLITEAL VEIN (MULTI): Status: ACTIVE | Noted: 2022-06-22

## 2024-04-02 PROBLEM — R11.0 NAUSEA: Status: ACTIVE | Noted: 2024-04-02

## 2024-04-02 PROBLEM — M25.462 EFFUSION OF LEFT KNEE: Status: ACTIVE | Noted: 2024-04-02

## 2024-04-02 PROBLEM — G45.9 TIA (TRANSIENT ISCHEMIC ATTACK): Status: ACTIVE | Noted: 2024-04-02

## 2024-04-02 PROBLEM — M17.12 OSTEOARTHRITIS OF LEFT KNEE: Status: ACTIVE | Noted: 2023-08-23

## 2024-04-02 PROBLEM — M25.519 SHOULDER PAIN: Status: ACTIVE | Noted: 2024-04-02

## 2024-04-02 PROBLEM — L98.9 DISORDER OF SKIN: Status: ACTIVE | Noted: 2017-01-25

## 2024-04-02 PROBLEM — G89.18 POSTOPERATIVE PAIN: Status: ACTIVE | Noted: 2024-04-02

## 2024-04-02 PROBLEM — Z86.0100 HISTORY OF COLONIC POLYPS: Status: ACTIVE | Noted: 2024-04-02

## 2024-04-02 PROBLEM — R53.83 FATIGUE: Status: ACTIVE | Noted: 2024-04-02

## 2024-04-02 PROBLEM — E66.9 OBESITY WITH BODY MASS INDEX 30 OR GREATER: Status: ACTIVE | Noted: 2024-04-02

## 2024-04-02 PROBLEM — M71.22 SYNOVIAL CYST OF LEFT KNEE: Status: ACTIVE | Noted: 2023-08-23

## 2024-04-02 PROBLEM — G10 HUNTINGTON'S DISEASE (MULTI): Status: ACTIVE | Noted: 2022-06-22

## 2024-04-02 PROBLEM — Z86.010 HISTORY OF COLONIC POLYPS: Status: ACTIVE | Noted: 2024-04-02

## 2024-04-02 PROBLEM — Z96.659 HISTORY OF TOTAL KNEE REPLACEMENT: Status: ACTIVE | Noted: 2023-08-23

## 2024-04-02 PROBLEM — M19.019 ARTHROPATHY OF SHOULDER REGION: Status: ACTIVE | Noted: 2024-04-02

## 2024-04-02 PROBLEM — N39.0 URINARY TRACT INFECTION: Status: ACTIVE | Noted: 2024-04-02

## 2024-04-02 NOTE — TELEPHONE ENCOUNTER
----- Message from Sharmaine Montero MA sent at 4/2/2024 10:41 AM EDT -----  Regarding: RE: movement disorders follow up  I called and left a voicemail on wife's cell with this number.   ----- Message -----  From: Nikkie Matthews MD  Sent: 4/1/2024   1:51 PM EDT  To: Sharmaine Montero MA  Subject: FW: movement disorders follow up                 Is this the number you gave his wife?  This split into 2 threads.  ----- Message -----  From: Naheed Block MD  Sent: 4/1/2024   1:19 PM EDT  To: Nikkie Matthews MD  Subject: RE: movement disorders follow up                 [phone]  ----- Message -----  From: Nikkie Matthews MD  Sent: 4/1/2024  11:48 AM EDT  To: Sharmaine Montero MA; Naheed Block MD  Subject: RE: movement disorders follow up                 Thanks, Dr. Roldan replied 1-3 months but 2 works.  He mentioned HD clinic.  What is the scheduling number, please?  ----- Message -----  From: Naheed Block MD  Sent: 3/31/2024   9:36 PM EDT  To: Nikkie Matthews MD  Subject: RE: movement disorders follow up                 [facility] Dr Matthews we would like to see him in two months  ----- Message -----  From: Nikkie Matthews MD  Sent: 3/30/2024  10:12 AM EDT  To: Bridger Roldan MD; Sharmaine Montero MA; #  Subject: movement disorders follow up                     Pablo Pinto and Naheed,    This patient was scheduled for his shoulder replacement yesterday, but it was cancelled due to fever.  Notes state that they are rescheduling for next week.    When did you wish to see him back to assess his symptoms on amantadine?  I will await your thoughts before considering any more genetic testing.    He is also pursuing EMG with Dr. Treadwell after recovered from surgery.    S

## 2024-04-03 ENCOUNTER — OFFICE VISIT (OUTPATIENT)
Dept: PRIMARY CARE | Facility: CLINIC | Age: 69
DRG: 483 | End: 2024-04-03
Payer: MEDICARE

## 2024-04-03 VITALS
SYSTOLIC BLOOD PRESSURE: 112 MMHG | BODY MASS INDEX: 22.34 KG/M2 | DIASTOLIC BLOOD PRESSURE: 74 MMHG | OXYGEN SATURATION: 97 % | WEIGHT: 139 LBS | HEART RATE: 72 BPM | HEIGHT: 66 IN | TEMPERATURE: 97.9 F

## 2024-04-03 DIAGNOSIS — M19.012 PRIMARY OSTEOARTHRITIS OF LEFT SHOULDER: Primary | ICD-10-CM

## 2024-04-03 DIAGNOSIS — R50.9 FEVER, UNSPECIFIED FEVER CAUSE: ICD-10-CM

## 2024-04-03 PROCEDURE — 1159F MED LIST DOCD IN RCRD: CPT | Performed by: INTERNAL MEDICINE

## 2024-04-03 PROCEDURE — 99214 OFFICE O/P EST MOD 30 MIN: CPT | Performed by: INTERNAL MEDICINE

## 2024-04-03 PROCEDURE — 1160F RVW MEDS BY RX/DR IN RCRD: CPT | Performed by: INTERNAL MEDICINE

## 2024-04-03 PROCEDURE — 1125F AMNT PAIN NOTED PAIN PRSNT: CPT | Performed by: INTERNAL MEDICINE

## 2024-04-03 ASSESSMENT — PAIN SCALES - GENERAL: PAINLEVEL: 1

## 2024-04-03 ASSESSMENT — ENCOUNTER SYMPTOMS
LOSS OF SENSATION IN FEET: 0
OCCASIONAL FEELINGS OF UNSTEADINESS: 1
DEPRESSION: 0

## 2024-04-03 ASSESSMENT — PATIENT HEALTH QUESTIONNAIRE - PHQ9
2. FEELING DOWN, DEPRESSED OR HOPELESS: NOT AT ALL
1. LITTLE INTEREST OR PLEASURE IN DOING THINGS: NOT AT ALL
SUM OF ALL RESPONSES TO PHQ9 QUESTIONS 1 AND 2: 0

## 2024-04-03 NOTE — PROGRESS NOTES
[facility]: [facility]  PROGRESS NOTE      Davi Garsia is a 68 y.o. male that is presenting today for Pre-op Clearance (Upcoming surgery, current fever).    Assessment/Plan   Diagnoses and all orders for this visit:  Primary osteoarthritis of left shoulder     Per ortho team  Fever, unspecified fever cause    As per the HPI/Exam/ BW and patient has been afebrile for the past 5 days w/o any Tx or meds. He should be ready for the surgery on 4/5/24    Subjective    - Patient is here today for Re-evaluation before his Left shoulder arthroplasty that was postponed from 3/29/24 to 4/5/24 due to fever which resolved that same day per the patient today he is still afebrile and denies any ENT, respiratory, GI, Uro or new cuts or incision  ,     - Patient denies any symptoms or concerns at this time.    - patient denies any adverse reactions to or concerns with his/her meds.      Review of Systems   All pertinent POSITIVES as noted per HPI.  All other systems have been reviewed and are NEGATIVE and /or Noncontributory to this patient current visit or complaint.    Objective   Vitals:    04/03/24 1443   BP: 112/74   Pulse: 72   Temp: 36.6 °C (97.9 °F)   SpO2: 97%      Body mass index is 22.44 kg/m².  Physical Exam  Vitals and nursing note reviewed.   Constitutional:       Appearance: Normal appearance.   HENT:      Head: Normocephalic and atraumatic.      Right Ear: Tympanic membrane, ear canal and external ear normal.      Left Ear: Tympanic membrane, ear canal and external ear normal.      Nose: Nose normal.      Mouth/Throat:      Mouth: Mucous membranes are moist.      Pharynx: Oropharynx is clear.   Eyes:      Extraocular Movements: Extraocular movements intact.      Conjunctiva/sclera: Conjunctivae normal.      Pupils: Pupils are equal, round, and reactive to light.   Neck:      Vascular: No carotid bruit.   Cardiovascular:      Rate and Rhythm: Normal rate and regular rhythm.      Pulses:  "Normal pulses.      Heart sounds: Normal heart sounds.   Pulmonary:      Effort: Pulmonary effort is normal.      Breath sounds: Normal breath sounds.   Abdominal:      General: Abdomen is flat. Bowel sounds are normal.      Palpations: Abdomen is soft.   Musculoskeletal:         General: Tenderness present. No swelling. Normal range of motion.      Cervical back: Normal range of motion and neck supple.      Comments: Left shoulder with significant limitation in the ROM   Lymphadenopathy:      Cervical: No cervical adenopathy.   Skin:     General: Skin is warm and dry.   Neurological:      Mental Status: He is alert and oriented to person, place, and time. Mental status is at baseline.   Psychiatric:         Mood and Affect: Mood normal.         Behavior: Behavior normal.     Diagnostic Results   Lab Results   Component Value Date    GLUCOSE 103 (H) 03/15/2024    CALCIUM 8.9 03/15/2024     03/15/2024    K 4.5 03/15/2024    CO2 25 03/15/2024     03/15/2024    BUN 14 03/15/2024    CREATININE 0.90 03/15/2024     Lab Results   Component Value Date    ALT 18 06/26/2023    AST 17 06/26/2023    ALKPHOS 79 06/26/2023    BILITOT 0.2 06/26/2023     Lab Results   Component Value Date    WBC 5.1 03/15/2024    HGB 13.5 03/15/2024    HCT 41.3 03/15/2024    MCV 95 03/15/2024     03/15/2024     Lab Results   Component Value Date    CHOL 159 06/26/2023    CHOL 144 08/22/2022    CHOL 163 01/14/2021     Lab Results   Component Value Date    HDL 66 06/26/2023    HDL 57 08/22/2022    HDL 41 01/14/2021     Lab Results   Component Value Date    LDLCALC 77 06/26/2023    LDLCALC 76 08/22/2022    LDLCALC 108 01/14/2021     Lab Results   Component Value Date    TRIG 80 06/26/2023    TRIG 57 08/22/2022    TRIG 70 01/14/2021     No components found for: \"CHOLHDL\"  Lab Results   Component Value Date    HGBA1C 5.4 03/15/2024     Other labs not included in the list above were reviewed either before or during this " encounter.    History    Past Medical History:   Diagnosis Date    Arthritis     BPH with elevated PSA     Current every day smoker     Depression     Fibromyalgia     GERD (gastroesophageal reflux disease)     History of peritonitis     Hyperlipidemia     Insomnia     Movement disorder     Rule out Louisa's chorea versus Parkinson's    Osteoporosis     Postlaminectomy syndrome     TIA (transient ischemic attack) 2005     Past Surgical History:   Procedure Laterality Date    BACK SURGERY      X 3    CHOLECYSTECTOMY  2022    COLONOSCOPY W/ BIOPSIES      ESOPHAGOGASTRODUODENOSCOPY      MEDIPORT INSERTION, SINGLE      Has not been flushed or used in 14 years    PENILE PROSTHESIS IMPLANT      SHOULDER ARTHROSCOPY Bilateral     SPINAL CORD STIMULATOR IMPLANT      X 2.  Removal for infection both times    TOTAL KNEE ARTHROPLASTY Left      Family History   Problem Relation Name Age of Onset    Dementia Mother      Mental illness Mother      Heart attack Father      Diabetes Father      Other (brain tumor) Father      Heart disease Father      Cancer Maternal Grandfather      Leukemia Other      Pancreatic cancer Other       Social History     Socioeconomic History    Marital status:      Spouse name: Not on file    Number of children: Not on file    Years of education: Not on file    Highest education level: Not on file   Occupational History    Not on file   Tobacco Use    Smoking status: Every Day     Packs/day: 0.50     Years: 51.00     Additional pack years: 0.00     Total pack years: 25.50     Types: Cigarettes     Start date: 1972     Passive exposure: Current    Smokeless tobacco: Never   Vaping Use    Vaping Use: Never used   Substance and Sexual Activity    Alcohol use: Never    Drug use: Never    Sexual activity: Not on file   Other Topics Concern    Not on file   Social History Narrative    Not on file     Social Determinants of Health     Financial Resource Strain: Not on file   Food Insecurity: Not  on file   Transportation Needs: Not on file   Physical Activity: Not on file   Stress: Not on file   Social Connections: Not on file   Intimate Partner Violence: Not on file   Housing Stability: Not on file     Allergies   Allergen Reactions    Adhesive Tape-Silicones Unknown     Reactions: Blistering    Cephalexin Unknown    Clindamycin Unknown    Linezolid Unknown    Adhesive Rash     water blisters    Penicillins Rash and Swelling     chest swelling     Current Outpatient Medications on File Prior to Visit   Medication Sig Dispense Refill    amantadine (Symmetrel) 100 mg tablet One tablet twice a day 180 tablet 3    amitriptyline (Elavil) 10 mg tablet Take 1 tablet (10 mg) by mouth once daily at bedtime. 30 tablet 11    atorvastatin (Lipitor) 20 mg tablet Take 1 tablet (20 mg) by mouth once daily. (Patient taking differently: Take 2 tablets (40 mg) by mouth once daily.) 90 tablet 1    escitalopram (Lexapro) 5 mg tablet Take 1 tablet (5 mg) by mouth once daily. (Patient taking differently: Take 1 tablet (5 mg) by mouth once daily in the morning.) 90 tablet 1    gabapentin (Neurontin) 400 mg capsule Take 2 capsules (800 mg) by mouth 4 times a day. 360 capsule 0    ibuprofen 200 mg tablet Take 1 tablet (200 mg) by mouth every 6 hours if needed for mild pain (1 - 3).      ketamine (Ketalar) 4 mg/mL swish & spit solution Swish and spit 10 mL 1 time. Swish and swallow prn      Pump Patient Supplied Medication Inject under the skin continuously. Patient Own Pump    Meds: morphine and lidocaine  Provider name/ #:   Pump name: [employer]  Last & next fill date: next fill date is the 1st week in April      rivaroxaban (Xarelto) 20 mg tablet Take by mouth.      topiramate (Topamax) 100 mg tablet Take 1 tablet (100 mg) by mouth 2 times a day. 180 tablet 1    traZODone (Desyrel) 150 mg tablet TAKE 1 TABLET AS NEEDED AT BEDTIME FOR SLEEP 90 tablet 0    [] chlorhexidine (Peridex) 0.12 % solution Use 15 mL in the mouth or  throat if needed for wound care for up to 2 doses. Use cap to measure 15 mL.  Swish/gargle mouthwash for at least 30 seconds.  Do not swallow.  Use night before surgery after brushing teeth and morning of surgery after brushing teeth. Do not start before March 28, 2024. 120 mL 0    [DISCONTINUED] amantadine (Symmetrel) 100 mg tablet First week take one tablet daily then take one tablet twice a day (Patient taking differently: Take 1 tablet (100 mg) by mouth once daily in the morning. First week take one tablet daily then take one tablet twice a day) 60 tablet 0     No current facility-administered medications on file prior to visit.     Immunization History   Administered Date(s) Administered    Covaxin Sars-cov-2 Vaccination 12/09/2023    Flu vaccine, quadrivalent, high-dose, preservative free, age 65y+ (FLUZONE) 12/17/2020, 10/04/2021, 10/25/2022, 10/25/2023    Influenza, High Dose Seasonal, Preservative Free 10/01/2019, 09/07/2020    Influenza, Unspecified 10/01/2001, 10/01/2009    Influenza, injectable, quadrivalent 10/07/2016    Influenza, seasonal, injectable 09/29/2012, 10/24/2013, 10/09/2015, 09/19/2018    Novel influenza-H1N1-09, preservative-free 01/04/2010    Pfizer COVID-19 vaccine, bivalent, age 12 years and older (30 mcg/0.3 mL) 10/25/2022    Pfizer Purple Cap SARS-CoV-2 03/24/2021, 04/23/2021, 11/11/2021    Pneumococcal conjugate vaccine, 13-valent (PREVNAR 13) 10/07/2016    Pneumococcal conjugate vaccine, 20-valent (PREVNAR 20) 02/15/2023    Pneumococcal polysaccharide vaccine, 23-valent, age 2 years and older (PNEUMOVAX 23) 01/01/2008, 03/24/2010, 09/04/2018, 07/17/2020    RSV, 60 Years And Older (AREXVY) 12/09/2023    Td vaccine, age 7 years and older (TDVAX) 02/17/2003    Td vaccine, age 7 years and older (TENIVAC) 07/23/1996    Tdap vaccine, age 7 year and older (BOOSTRIX, ADACEL) 10/01/2019     Patient's medical history was reviewed and updated either before or during this encounter.        Alcira Wooten MD

## 2024-04-05 ENCOUNTER — ANESTHESIA (OUTPATIENT)
Dept: OPERATING ROOM | Facility: HOSPITAL | Age: 69
DRG: 483 | End: 2024-04-05
Payer: MEDICARE

## 2024-04-05 ENCOUNTER — HOSPITAL ENCOUNTER (INPATIENT)
Facility: HOSPITAL | Age: 69
LOS: 1 days | Discharge: HOME | DRG: 483 | End: 2024-04-06
Attending: ORTHOPAEDIC SURGERY | Admitting: ORTHOPAEDIC SURGERY
Payer: MEDICARE

## 2024-04-05 ENCOUNTER — ANESTHESIA EVENT (OUTPATIENT)
Dept: OPERATING ROOM | Facility: HOSPITAL | Age: 69
DRG: 483 | End: 2024-04-05
Payer: MEDICARE

## 2024-04-05 ENCOUNTER — APPOINTMENT (OUTPATIENT)
Dept: RADIOLOGY | Facility: HOSPITAL | Age: 69
DRG: 483 | End: 2024-04-05
Payer: MEDICARE

## 2024-04-05 DIAGNOSIS — M19.019 SHOULDER ARTHRITIS: Primary | ICD-10-CM

## 2024-04-05 DIAGNOSIS — M12.812 ROTATOR CUFF ARTHROPATHY, LEFT: ICD-10-CM

## 2024-04-05 PROCEDURE — 7100000002 HC RECOVERY ROOM TIME - EACH INCREMENTAL 1 MINUTE: Performed by: ORTHOPAEDIC SURGERY

## 2024-04-05 PROCEDURE — 2500000001 HC RX 250 WO HCPCS SELF ADMINISTERED DRUGS (ALT 637 FOR MEDICARE OP): Performed by: NURSE PRACTITIONER

## 2024-04-05 PROCEDURE — 2500000004 HC RX 250 GENERAL PHARMACY W/ HCPCS (ALT 636 FOR OP/ED): Mod: JZ | Performed by: ANESTHESIOLOGIST ASSISTANT

## 2024-04-05 PROCEDURE — 2500000004 HC RX 250 GENERAL PHARMACY W/ HCPCS (ALT 636 FOR OP/ED): Performed by: ANESTHESIOLOGY

## 2024-04-05 PROCEDURE — 73030 X-RAY EXAM OF SHOULDER: CPT | Mod: LEFT SIDE | Performed by: RADIOLOGY

## 2024-04-05 PROCEDURE — C1713 ANCHOR/SCREW BN/BN,TIS/BN: HCPCS | Performed by: ORTHOPAEDIC SURGERY

## 2024-04-05 PROCEDURE — 0RRK00Z REPLACEMENT OF LEFT SHOULDER JOINT WITH REVERSE BALL AND SOCKET SYNTHETIC SUBSTITUTE, OPEN APPROACH: ICD-10-PCS | Performed by: ORTHOPAEDIC SURGERY

## 2024-04-05 PROCEDURE — 23472 RECONSTRUCT SHOULDER JOINT: CPT

## 2024-04-05 PROCEDURE — 94760 N-INVAS EAR/PLS OXIMETRY 1: CPT

## 2024-04-05 PROCEDURE — 3700000001 HC GENERAL ANESTHESIA TIME - INITIAL BASE CHARGE: Performed by: ORTHOPAEDIC SURGERY

## 2024-04-05 PROCEDURE — 2500000001 HC RX 250 WO HCPCS SELF ADMINISTERED DRUGS (ALT 637 FOR MEDICARE OP): Performed by: ORTHOPAEDIC SURGERY

## 2024-04-05 PROCEDURE — 3700000002 HC GENERAL ANESTHESIA TIME - EACH INCREMENTAL 1 MINUTE: Performed by: ORTHOPAEDIC SURGERY

## 2024-04-05 PROCEDURE — 2500000004 HC RX 250 GENERAL PHARMACY W/ HCPCS (ALT 636 FOR OP/ED): Performed by: ORTHOPAEDIC SURGERY

## 2024-04-05 PROCEDURE — 2740000001 HC OR 274 NO HCPCS: Performed by: ORTHOPAEDIC SURGERY

## 2024-04-05 PROCEDURE — A23472 PR RECONSTR TOTAL SHOULDER IMPLANT: Performed by: ANESTHESIOLOGIST ASSISTANT

## 2024-04-05 PROCEDURE — 2500000005 HC RX 250 GENERAL PHARMACY W/O HCPCS: Performed by: ANESTHESIOLOGIST ASSISTANT

## 2024-04-05 PROCEDURE — 73030 X-RAY EXAM OF SHOULDER: CPT | Mod: LT

## 2024-04-05 PROCEDURE — 64415 NJX AA&/STRD BRCH PLXS IMG: CPT | Performed by: ANESTHESIOLOGY

## 2024-04-05 PROCEDURE — C1776 JOINT DEVICE (IMPLANTABLE): HCPCS | Performed by: ORTHOPAEDIC SURGERY

## 2024-04-05 PROCEDURE — L3670 SO ACRO/CLAV CAN WEB PRE OTS: HCPCS | Performed by: ORTHOPAEDIC SURGERY

## 2024-04-05 PROCEDURE — 9420000001 HC RT PATIENT EDUCATION 5 MIN

## 2024-04-05 PROCEDURE — 2500000002 HC RX 250 W HCPCS SELF ADMINISTERED DRUGS (ALT 637 FOR MEDICARE OP, ALT 636 FOR OP/ED): Mod: MUE | Performed by: NURSE PRACTITIONER

## 2024-04-05 PROCEDURE — 96365 THER/PROPH/DIAG IV INF INIT: CPT | Mod: 59

## 2024-04-05 PROCEDURE — 1100000001 HC PRIVATE ROOM DAILY

## 2024-04-05 PROCEDURE — 2500000004 HC RX 250 GENERAL PHARMACY W/ HCPCS (ALT 636 FOR OP/ED): Mod: JZ | Performed by: ORTHOPAEDIC SURGERY

## 2024-04-05 PROCEDURE — G0378 HOSPITAL OBSERVATION PER HR: HCPCS

## 2024-04-05 PROCEDURE — 7100000001 HC RECOVERY ROOM TIME - INITIAL BASE CHARGE: Performed by: ORTHOPAEDIC SURGERY

## 2024-04-05 PROCEDURE — A23472 PR RECONSTR TOTAL SHOULDER IMPLANT: Performed by: ANESTHESIOLOGY

## 2024-04-05 PROCEDURE — 2780000003 HC OR 278 NO HCPCS: Performed by: ORTHOPAEDIC SURGERY

## 2024-04-05 PROCEDURE — 3600000005 HC OR TIME - INITIAL BASE CHARGE - PROCEDURE LEVEL FIVE: Performed by: ORTHOPAEDIC SURGERY

## 2024-04-05 PROCEDURE — 2500000005 HC RX 250 GENERAL PHARMACY W/O HCPCS: Performed by: ORTHOPAEDIC SURGERY

## 2024-04-05 PROCEDURE — 97165 OT EVAL LOW COMPLEX 30 MIN: CPT | Mod: GO

## 2024-04-05 PROCEDURE — 97161 PT EVAL LOW COMPLEX 20 MIN: CPT | Mod: GP

## 2024-04-05 PROCEDURE — 2720000007 HC OR 272 NO HCPCS: Performed by: ORTHOPAEDIC SURGERY

## 2024-04-05 PROCEDURE — 3600000010 HC OR TIME - EACH INCREMENTAL 1 MINUTE - PROCEDURE LEVEL FIVE: Performed by: ORTHOPAEDIC SURGERY

## 2024-04-05 DEVICE — DYNANITE VIP GLENOID PIN, NITINOL, 2.8MM
Type: IMPLANTABLE DEVICE | Site: SHOULDER | Status: FUNCTIONAL
Brand: ARTHREX®

## 2024-04-05 DEVICE — HUMERAL INSERT S/36 +3 TO FIT IN 36 CUP
Type: IMPLANTABLE DEVICE | Site: SHOULDER | Status: FUNCTIONAL
Brand: ARTHREX®

## 2024-04-05 DEVICE — IMPLANTABLE DEVICE: Type: IMPLANTABLE DEVICE | Site: SHOULDER | Status: FUNCTIONAL

## 2024-04-05 DEVICE — 5.5X20MM PERIPHERAL SCREW, LOCKING
Type: IMPLANTABLE DEVICE | Site: SHOULDER | Status: FUNCTIONAL
Brand: ARTHREX®

## 2024-04-05 DEVICE — GLENOSPHERE, 36/24 BASEPLATE TAPER: Type: IMPLANTABLE DEVICE | Site: SHOULDER | Status: FUNCTIONAL

## 2024-04-05 DEVICE — UNIVERS REVERS SPACER 36+6MM
Type: IMPLANTABLE DEVICE | Site: SHOULDER | Status: FUNCTIONAL
Brand: ARTHREX®

## 2024-04-05 DEVICE — 24MM BASEPLATE, 10° FULL AUG, +2 LAT, ST
Type: IMPLANTABLE DEVICE | Site: SHOULDER | Status: FUNCTIONAL
Brand: ARTHREX®

## 2024-04-05 DEVICE — 4.5X28MM PERIPHERAL SCREW, NON-LOCKING
Type: IMPLANTABLE DEVICE | Site: SHOULDER | Status: FUNCTIONAL
Brand: ARTHREX®

## 2024-04-05 DEVICE — SCREW, NON-LOCKING, 4.5MM X 32MM: Type: IMPLANTABLE DEVICE | Site: SHOULDER | Status: FUNCTIONAL

## 2024-04-05 DEVICE — UNIVERS REVERS SUTURE CUP, 36 (+2 RIGHT)
Type: IMPLANTABLE DEVICE | Site: SHOULDER | Status: FUNCTIONAL
Brand: ARTHREX®

## 2024-04-05 RX ORDER — GABAPENTIN 800 MG/1
800 TABLET ORAL 4 TIMES DAILY
Status: DISCONTINUED | OUTPATIENT
Start: 2024-04-05 | End: 2024-04-06 | Stop reason: HOSPADM

## 2024-04-05 RX ORDER — CHLORHEXIDINE GLUCONATE ORAL RINSE 1.2 MG/ML
15 SOLUTION DENTAL AS NEEDED
Status: DISCONTINUED | OUTPATIENT
Start: 2024-04-05 | End: 2024-04-06 | Stop reason: HOSPADM

## 2024-04-05 RX ORDER — LABETALOL HYDROCHLORIDE 5 MG/ML
5 INJECTION, SOLUTION INTRAVENOUS ONCE AS NEEDED
Status: DISCONTINUED | OUTPATIENT
Start: 2024-04-05 | End: 2024-04-05 | Stop reason: HOSPADM

## 2024-04-05 RX ORDER — ACETAMINOPHEN 325 MG/1
650 TABLET ORAL EVERY 6 HOURS SCHEDULED
Status: DISCONTINUED | OUTPATIENT
Start: 2024-04-05 | End: 2024-04-06 | Stop reason: HOSPADM

## 2024-04-05 RX ORDER — SODIUM CHLORIDE, SODIUM LACTATE, POTASSIUM CHLORIDE, CALCIUM CHLORIDE 600; 310; 30; 20 MG/100ML; MG/100ML; MG/100ML; MG/100ML
100 INJECTION, SOLUTION INTRAVENOUS CONTINUOUS
Status: DISCONTINUED | OUTPATIENT
Start: 2024-04-05 | End: 2024-04-05 | Stop reason: HOSPADM

## 2024-04-05 RX ORDER — VANCOMYCIN HYDROCHLORIDE 1 G/20ML
1 INJECTION, POWDER, LYOPHILIZED, FOR SOLUTION INTRAVENOUS ONCE
Status: DISCONTINUED | OUTPATIENT
Start: 2024-04-05 | End: 2024-04-05 | Stop reason: HOSPADM

## 2024-04-05 RX ORDER — FENTANYL CITRATE 50 UG/ML
50 INJECTION, SOLUTION INTRAMUSCULAR; INTRAVENOUS EVERY 5 MIN PRN
Status: DISCONTINUED | OUTPATIENT
Start: 2024-04-05 | End: 2024-04-05 | Stop reason: HOSPADM

## 2024-04-05 RX ORDER — NALOXONE HYDROCHLORIDE 0.4 MG/ML
0.2 INJECTION, SOLUTION INTRAMUSCULAR; INTRAVENOUS; SUBCUTANEOUS EVERY 5 MIN PRN
Status: DISCONTINUED | OUTPATIENT
Start: 2024-04-05 | End: 2024-04-06 | Stop reason: HOSPADM

## 2024-04-05 RX ORDER — VANCOMYCIN 1 G/200ML
INJECTION, SOLUTION INTRAVENOUS AS NEEDED
Status: DISCONTINUED | OUTPATIENT
Start: 2024-04-05 | End: 2024-04-05

## 2024-04-05 RX ORDER — VANCOMYCIN 1 G/200ML
1 INJECTION, SOLUTION INTRAVENOUS ONCE
Status: COMPLETED | OUTPATIENT
Start: 2024-04-05 | End: 2024-04-05

## 2024-04-05 RX ORDER — PREGABALIN 75 MG/1
75 CAPSULE ORAL ONCE
Status: COMPLETED | OUTPATIENT
Start: 2024-04-05 | End: 2024-04-05

## 2024-04-05 RX ORDER — ESCITALOPRAM OXALATE 10 MG/1
5 TABLET ORAL DAILY
Status: DISCONTINUED | OUTPATIENT
Start: 2024-04-05 | End: 2024-04-06 | Stop reason: HOSPADM

## 2024-04-05 RX ORDER — SODIUM CHLORIDE, SODIUM LACTATE, POTASSIUM CHLORIDE, CALCIUM CHLORIDE 600; 310; 30; 20 MG/100ML; MG/100ML; MG/100ML; MG/100ML
50 INJECTION, SOLUTION INTRAVENOUS CONTINUOUS
Status: DISCONTINUED | OUTPATIENT
Start: 2024-04-05 | End: 2024-04-06 | Stop reason: HOSPADM

## 2024-04-05 RX ORDER — ONDANSETRON HYDROCHLORIDE 2 MG/ML
INJECTION, SOLUTION INTRAVENOUS AS NEEDED
Status: DISCONTINUED | OUTPATIENT
Start: 2024-04-05 | End: 2024-04-05

## 2024-04-05 RX ORDER — DEXAMETHASONE SODIUM PHOSPHATE 10 MG/ML
INJECTION INTRAMUSCULAR; INTRAVENOUS AS NEEDED
Status: DISCONTINUED | OUTPATIENT
Start: 2024-04-05 | End: 2024-04-05

## 2024-04-05 RX ORDER — VANCOMYCIN HYDROCHLORIDE 1 G/20ML
INJECTION, POWDER, LYOPHILIZED, FOR SOLUTION INTRAVENOUS AS NEEDED
Status: DISCONTINUED | OUTPATIENT
Start: 2024-04-05 | End: 2024-04-05 | Stop reason: HOSPADM

## 2024-04-05 RX ORDER — ATORVASTATIN CALCIUM 20 MG/1
20 TABLET, FILM COATED ORAL NIGHTLY
Status: DISCONTINUED | OUTPATIENT
Start: 2024-04-05 | End: 2024-04-06 | Stop reason: HOSPADM

## 2024-04-05 RX ORDER — AMITRIPTYLINE HYDROCHLORIDE 10 MG/1
10 TABLET, FILM COATED ORAL NIGHTLY
Status: DISCONTINUED | OUTPATIENT
Start: 2024-04-05 | End: 2024-04-06 | Stop reason: HOSPADM

## 2024-04-05 RX ORDER — ONDANSETRON HYDROCHLORIDE 2 MG/ML
4 INJECTION, SOLUTION INTRAVENOUS EVERY 8 HOURS PRN
Status: DISCONTINUED | OUTPATIENT
Start: 2024-04-05 | End: 2024-04-06 | Stop reason: HOSPADM

## 2024-04-05 RX ORDER — MIDAZOLAM HYDROCHLORIDE 1 MG/ML
2 INJECTION, SOLUTION INTRAMUSCULAR; INTRAVENOUS ONCE
Status: COMPLETED | OUTPATIENT
Start: 2024-04-05 | End: 2024-04-05

## 2024-04-05 RX ORDER — OXYCODONE AND ACETAMINOPHEN 5; 325 MG/1; MG/1
1 TABLET ORAL EVERY 4 HOURS PRN
Status: DISCONTINUED | OUTPATIENT
Start: 2024-04-05 | End: 2024-04-06 | Stop reason: HOSPADM

## 2024-04-05 RX ORDER — CELECOXIB 200 MG/1
400 CAPSULE ORAL ONCE
Status: COMPLETED | OUTPATIENT
Start: 2024-04-05 | End: 2024-04-05

## 2024-04-05 RX ORDER — ETOMIDATE 2 MG/ML
INJECTION INTRAVENOUS AS NEEDED
Status: DISCONTINUED | OUTPATIENT
Start: 2024-04-05 | End: 2024-04-05

## 2024-04-05 RX ORDER — HYDRALAZINE HYDROCHLORIDE 20 MG/ML
5 INJECTION INTRAMUSCULAR; INTRAVENOUS EVERY 30 MIN PRN
Status: DISCONTINUED | OUTPATIENT
Start: 2024-04-05 | End: 2024-04-05 | Stop reason: HOSPADM

## 2024-04-05 RX ORDER — PROPOFOL 10 MG/ML
INJECTION, EMULSION INTRAVENOUS AS NEEDED
Status: DISCONTINUED | OUTPATIENT
Start: 2024-04-05 | End: 2024-04-05

## 2024-04-05 RX ORDER — AMANTADINE HYDROCHLORIDE 100 MG/1
100 CAPSULE, GELATIN COATED ORAL DAILY
Status: DISCONTINUED | OUTPATIENT
Start: 2024-04-05 | End: 2024-04-06 | Stop reason: HOSPADM

## 2024-04-05 RX ORDER — ONDANSETRON 4 MG/1
4 TABLET, ORALLY DISINTEGRATING ORAL EVERY 8 HOURS PRN
Status: DISCONTINUED | OUTPATIENT
Start: 2024-04-05 | End: 2024-04-06 | Stop reason: HOSPADM

## 2024-04-05 RX ORDER — ACETAMINOPHEN 500 MG
500 TABLET ORAL ONCE
Status: COMPLETED | OUTPATIENT
Start: 2024-04-05 | End: 2024-04-05

## 2024-04-05 RX ORDER — ONDANSETRON HYDROCHLORIDE 2 MG/ML
4 INJECTION, SOLUTION INTRAVENOUS ONCE AS NEEDED
Status: DISCONTINUED | OUTPATIENT
Start: 2024-04-05 | End: 2024-04-05 | Stop reason: HOSPADM

## 2024-04-05 RX ORDER — OXYCODONE AND ACETAMINOPHEN 5; 325 MG/1; MG/1
1 TABLET ORAL EVERY 6 HOURS PRN
Qty: 25 TABLET | Refills: 0 | Status: SHIPPED | OUTPATIENT
Start: 2024-04-05 | End: 2024-04-11

## 2024-04-05 RX ORDER — TOPIRAMATE 100 MG/1
100 TABLET, FILM COATED ORAL 2 TIMES DAILY
Status: DISCONTINUED | OUTPATIENT
Start: 2024-04-05 | End: 2024-04-06 | Stop reason: HOSPADM

## 2024-04-05 RX ORDER — VANCOMYCIN 1.5 G/300ML
1.5 INJECTION, SOLUTION INTRAVENOUS EVERY 12 HOURS
Status: DISCONTINUED | OUTPATIENT
Start: 2024-04-05 | End: 2024-04-05 | Stop reason: SDUPTHER

## 2024-04-05 RX ORDER — TRANEXAMIC ACID 100 MG/ML
INJECTION, SOLUTION INTRAVENOUS AS NEEDED
Status: DISCONTINUED | OUTPATIENT
Start: 2024-04-05 | End: 2024-04-05

## 2024-04-05 RX ORDER — FENTANYL CITRATE 50 UG/ML
100 INJECTION, SOLUTION INTRAMUSCULAR; INTRAVENOUS ONCE
Status: COMPLETED | OUTPATIENT
Start: 2024-04-05 | End: 2024-04-05

## 2024-04-05 RX ORDER — LIDOCAINE HYDROCHLORIDE 10 MG/ML
INJECTION INFILTRATION; PERINEURAL AS NEEDED
Status: DISCONTINUED | OUTPATIENT
Start: 2024-04-05 | End: 2024-04-05

## 2024-04-05 RX ORDER — ROPIVACAINE HYDROCHLORIDE 5 MG/ML
INJECTION, SOLUTION EPIDURAL; INFILTRATION; PERINEURAL AS NEEDED
Status: DISCONTINUED | OUTPATIENT
Start: 2024-04-05 | End: 2024-04-05

## 2024-04-05 RX ORDER — ALBUTEROL SULFATE 0.83 MG/ML
2.5 SOLUTION RESPIRATORY (INHALATION) ONCE AS NEEDED
Status: DISCONTINUED | OUTPATIENT
Start: 2024-04-05 | End: 2024-04-05 | Stop reason: HOSPADM

## 2024-04-05 RX ADMIN — PREGABALIN 75 MG: 75 CAPSULE ORAL at 10:25

## 2024-04-05 RX ADMIN — ESCITALOPRAM OXALATE 5 MG: 10 TABLET ORAL at 17:30

## 2024-04-05 RX ADMIN — VANCOMYCIN 1 G: 1 INJECTION, SOLUTION INTRAVENOUS at 17:30

## 2024-04-05 RX ADMIN — GABAPENTIN 800 MG: 800 TABLET, FILM COATED ORAL at 23:07

## 2024-04-05 RX ADMIN — POVIDONE-IODINE 1 APPLICATION: 5 SOLUTION TOPICAL at 10:23

## 2024-04-05 RX ADMIN — FENTANYL CITRATE 50 MCG: 50 INJECTION INTRAMUSCULAR; INTRAVENOUS at 10:36

## 2024-04-05 RX ADMIN — VANCOMYCIN 1 G: 1 INJECTION, SOLUTION INTRAVENOUS at 11:18

## 2024-04-05 RX ADMIN — CELECOXIB 400 MG: 200 CAPSULE ORAL at 10:25

## 2024-04-05 RX ADMIN — SODIUM CHLORIDE, SODIUM LACTATE, POTASSIUM CHLORIDE, AND CALCIUM CHLORIDE: 600; 310; 30; 20 INJECTION, SOLUTION INTRAVENOUS at 11:03

## 2024-04-05 RX ADMIN — TOPIRAMATE 100 MG: 100 TABLET, FILM COATED ORAL at 23:07

## 2024-04-05 RX ADMIN — SODIUM CHLORIDE, SODIUM LACTATE, POTASSIUM CHLORIDE, AND CALCIUM CHLORIDE 50 ML/HR: 600; 310; 30; 20 INJECTION, SOLUTION INTRAVENOUS at 15:13

## 2024-04-05 RX ADMIN — GABAPENTIN 800 MG: 800 TABLET, FILM COATED ORAL at 17:30

## 2024-04-05 RX ADMIN — MIDAZOLAM 2 MG: 1 INJECTION INTRAMUSCULAR; INTRAVENOUS at 10:36

## 2024-04-05 RX ADMIN — ATORVASTATIN CALCIUM 20 MG: 20 TABLET, FILM COATED ORAL at 23:07

## 2024-04-05 RX ADMIN — AMITRIPTYLINE HYDROCHLORIDE 10 MG: 10 TABLET, FILM COATED ORAL at 23:07

## 2024-04-05 RX ADMIN — DEXAMETHASONE SODIUM PHOSPHATE 10 MG: 10 INJECTION, SOLUTION INTRAMUSCULAR; INTRAVENOUS at 10:43

## 2024-04-05 RX ADMIN — LIDOCAINE HYDROCHLORIDE 5 ML: 10 INJECTION, SOLUTION INFILTRATION; PERINEURAL at 11:10

## 2024-04-05 RX ADMIN — TRANEXAMIC ACID 1000 MG: 100 INJECTION, SOLUTION INTRAVENOUS at 11:29

## 2024-04-05 RX ADMIN — ACETAMINOPHEN 500 MG: 500 TABLET ORAL at 10:25

## 2024-04-05 RX ADMIN — TRAZODONE HYDROCHLORIDE 150 MG: 100 TABLET ORAL at 23:10

## 2024-04-05 RX ADMIN — ROPIVACAINE HYDROCHLORIDE 20 ML: 5 INJECTION, SOLUTION EPIDURAL; INFILTRATION; PERINEURAL at 10:43

## 2024-04-05 RX ADMIN — PROPOFOL 120 MG: 10 INJECTION, EMULSION INTRAVENOUS at 11:10

## 2024-04-05 RX ADMIN — ONDANSETRON HYDROCHLORIDE 4 MG: 2 INJECTION INTRAMUSCULAR; INTRAVENOUS at 12:49

## 2024-04-05 RX ADMIN — AMANTADINE HYDROCHLORIDE 100 MG: 100 CAPSULE ORAL at 17:33

## 2024-04-05 SDOH — SOCIAL STABILITY: SOCIAL INSECURITY: ARE YOU OR HAVE YOU BEEN THREATENED OR ABUSED PHYSICALLY, EMOTIONALLY, OR SEXUALLY BY ANYONE?: NO

## 2024-04-05 SDOH — SOCIAL STABILITY: SOCIAL INSECURITY: DOES ANYONE TRY TO KEEP YOU FROM HAVING/CONTACTING OTHER FRIENDS OR DOING THINGS OUTSIDE YOUR HOME?: NO

## 2024-04-05 SDOH — SOCIAL STABILITY: SOCIAL INSECURITY: HAVE YOU HAD THOUGHTS OF HARMING ANYONE ELSE?: NO

## 2024-04-05 SDOH — SOCIAL STABILITY: SOCIAL INSECURITY: DO YOU FEEL UNSAFE GOING BACK TO THE PLACE WHERE YOU ARE LIVING?: NO

## 2024-04-05 SDOH — SOCIAL STABILITY: SOCIAL INSECURITY: ABUSE: ADULT

## 2024-04-05 SDOH — SOCIAL STABILITY: SOCIAL INSECURITY: WERE YOU ABLE TO COMPLETE ALL THE BEHAVIORAL HEALTH SCREENINGS?: YES

## 2024-04-05 SDOH — SOCIAL STABILITY: SOCIAL INSECURITY: DO YOU FEEL ANYONE HAS EXPLOITED OR TAKEN ADVANTAGE OF YOU FINANCIALLY OR OF YOUR PERSONAL PROPERTY?: NO

## 2024-04-05 SDOH — SOCIAL STABILITY: SOCIAL INSECURITY: HAS ANYONE EVER THREATENED TO HURT YOUR FAMILY OR YOUR PETS?: NO

## 2024-04-05 SDOH — SOCIAL STABILITY: SOCIAL INSECURITY: ARE THERE ANY APPARENT SIGNS OF INJURIES/BEHAVIORS THAT COULD BE RELATED TO ABUSE/NEGLECT?: NO

## 2024-04-05 SDOH — HEALTH STABILITY: MENTAL HEALTH: CURRENT SMOKER: 1

## 2024-04-05 ASSESSMENT — COGNITIVE AND FUNCTIONAL STATUS - GENERAL
PERSONAL GROOMING: A LITTLE
HELP NEEDED FOR BATHING: A LITTLE
TOILETING: A LITTLE
MOVING TO AND FROM BED TO CHAIR: A LITTLE
TOILETING: A LITTLE
WALKING IN HOSPITAL ROOM: A LITTLE
TURNING FROM BACK TO SIDE WHILE IN FLAT BAD: A LITTLE
PATIENT BASELINE BEDBOUND: NO
STANDING UP FROM CHAIR USING ARMS: A LITTLE
DAILY ACTIVITIY SCORE: 15
MOVING TO AND FROM BED TO CHAIR: A LITTLE
DRESSING REGULAR LOWER BODY CLOTHING: A LOT
WALKING IN HOSPITAL ROOM: A LITTLE
DRESSING REGULAR UPPER BODY CLOTHING: A LOT
EATING MEALS: A LITTLE
HELP NEEDED FOR BATHING: A LOT
MOVING FROM LYING ON BACK TO SITTING ON SIDE OF FLAT BED WITH BEDRAILS: A LITTLE
MOBILITY SCORE: 18
DAILY ACTIVITIY SCORE: 19
MOVING FROM LYING ON BACK TO SITTING ON SIDE OF FLAT BED WITH BEDRAILS: A LITTLE
CLIMB 3 TO 5 STEPS WITH RAILING: A LITTLE
PERSONAL GROOMING: A LITTLE
MOBILITY SCORE: 18
DRESSING REGULAR UPPER BODY CLOTHING: A LITTLE
CLIMB 3 TO 5 STEPS WITH RAILING: A LITTLE
TURNING FROM BACK TO SIDE WHILE IN FLAT BAD: A LITTLE
STANDING UP FROM CHAIR USING ARMS: A LITTLE
DRESSING REGULAR LOWER BODY CLOTHING: A LITTLE

## 2024-04-05 ASSESSMENT — LIFESTYLE VARIABLES
HOW OFTEN DO YOU HAVE 6 OR MORE DRINKS ON ONE OCCASION: NEVER
AUDIT-C TOTAL SCORE: 0
SKIP TO QUESTIONS 9-10: 1
HOW OFTEN DO YOU HAVE A DRINK CONTAINING ALCOHOL: NEVER
AUDIT-C TOTAL SCORE: 0
HOW MANY STANDARD DRINKS CONTAINING ALCOHOL DO YOU HAVE ON A TYPICAL DAY: PATIENT DOES NOT DRINK

## 2024-04-05 ASSESSMENT — ACTIVITIES OF DAILY LIVING (ADL)
GROOMING: NEEDS ASSISTANCE
LACK_OF_TRANSPORTATION: NO
HOME_MANAGEMENT_TIME_ENTRY: 4
WALKS IN HOME: NEEDS ASSISTANCE
HEARING - RIGHT EAR: FUNCTIONAL
PATIENT'S MEMORY ADEQUATE TO SAFELY COMPLETE DAILY ACTIVITIES?: YES
BATHING: NEEDS ASSISTANCE
ADL_ASSISTANCE: INDEPENDENT
ADL_ASSISTANCE: INDEPENDENT
TOILETING: NEEDS ASSISTANCE
DRESSING YOURSELF: NEEDS ASSISTANCE
JUDGMENT_ADEQUATE_SAFELY_COMPLETE_DAILY_ACTIVITIES: YES
BATHING_ASSISTANCE: MODERATE
HEARING - LEFT EAR: FUNCTIONAL
ADEQUATE_TO_COMPLETE_ADL: YES
FEEDING YOURSELF: INDEPENDENT

## 2024-04-05 ASSESSMENT — PATIENT HEALTH QUESTIONNAIRE - PHQ9
2. FEELING DOWN, DEPRESSED OR HOPELESS: NOT AT ALL
2. FEELING DOWN, DEPRESSED OR HOPELESS: NOT AT ALL
SUM OF ALL RESPONSES TO PHQ9 QUESTIONS 1 & 2: 0
1. LITTLE INTEREST OR PLEASURE IN DOING THINGS: NOT AT ALL
1. LITTLE INTEREST OR PLEASURE IN DOING THINGS: NOT AT ALL
SUM OF ALL RESPONSES TO PHQ9 QUESTIONS 1 & 2: 0

## 2024-04-05 ASSESSMENT — ENCOUNTER SYMPTOMS
TROUBLE SWALLOWING: 0
EYES NEGATIVE: 1
ABDOMINAL DISTENTION: 0
SORE THROAT: 0
SINUS PRESSURE: 0
TREMORS: 0
MYALGIAS: 0
HEADACHES: 0
FLANK PAIN: 0
WHEEZING: 0
LIGHT-HEADEDNESS: 0
APNEA: 0
WEAKNESS: 0
CONSTIPATION: 0
DIZZINESS: 0
DIAPHORESIS: 0
SPEECH DIFFICULTY: 0
NAUSEA: 0
CHILLS: 0
ABDOMINAL PAIN: 0
RHINORRHEA: 0
SHORTNESS OF BREATH: 0
HEMATURIA: 0
FATIGUE: 0
NUMBNESS: 0
COUGH: 0
PALPITATIONS: 0
NECK PAIN: 0
BACK PAIN: 1
DYSURIA: 0
FEVER: 0
CHEST TIGHTNESS: 0
DIFFICULTY URINATING: 0
APPETITE CHANGE: 0
PSYCHIATRIC NEGATIVE: 1
DIARRHEA: 0
CONSTITUTIONAL NEGATIVE: 1
NECK STIFFNESS: 0
JOINT SWELLING: 1
HEMATOLOGIC/LYMPHATIC NEGATIVE: 1

## 2024-04-05 ASSESSMENT — PAIN - FUNCTIONAL ASSESSMENT
PAIN_FUNCTIONAL_ASSESSMENT: 0-10

## 2024-04-05 ASSESSMENT — PAIN SCALES - GENERAL
PAINLEVEL_OUTOF10: 0 - NO PAIN
PAINLEVEL_OUTOF10: 4
PAIN_LEVEL: 1
PAINLEVEL_OUTOF10: 0 - NO PAIN
PAINLEVEL_OUTOF10: 1
PAINLEVEL_OUTOF10: 0 - NO PAIN
PAINLEVEL_OUTOF10: 0 - NO PAIN

## 2024-04-05 ASSESSMENT — COLUMBIA-SUICIDE SEVERITY RATING SCALE - C-SSRS
1. IN THE PAST MONTH, HAVE YOU WISHED YOU WERE DEAD OR WISHED YOU COULD GO TO SLEEP AND NOT WAKE UP?: NO
2. HAVE YOU ACTUALLY HAD ANY THOUGHTS OF KILLING YOURSELF?: NO
6. HAVE YOU EVER DONE ANYTHING, STARTED TO DO ANYTHING, OR PREPARED TO DO ANYTHING TO END YOUR LIFE?: NO

## 2024-04-05 NOTE — CONSULTS
Inpatient consult to Medicine  Consult performed by: JUSTEN Alvarez-CNP  Consult ordered by: Terrell Michelle MD        Reason For Consult  BPH, GERD, HLD, tobacco use depression     History Of Present Illness  Davi Garsia is a 68 y.o. male who admitted for scheduled surgery Arthroplasty  left reverse shoulder. Surgery was done   today 4/5/24 by Dr Casanova.  Patient just came from PACU does not move his left hand no pain.  Patient was walking with PT working in the room denies any dizziness or light headache.  Patient admits that he has morphine pump and lidocaine after back surgery to control his pain.  Patient denies any chest pain shortness of breath.  No nausea, vomiting or abdominal pain.  No fever or chills.  He still smokes half pack a day but he used to smoke 1 pack a day since he was 15 years old.   Hospitalist team were consult due to with past medical history of back surgery , arthritis, BPH,   Bacot use ,  Depression, Fibromyalgia, GERD  History of peritonitis, Hyperlipidemia, Insomnia, Movement disorder, Osteoporosis, Postlaminectomy syndrome, and TIA (2005).      Past Medical History  He has a past medical history of Arthritis, BPH with elevated PSA, Current every day smoker, Depression, Fibromyalgia, GERD (gastroesophageal reflux disease), History of peritonitis, Hyperlipidemia, Insomnia, Movement disorder, Osteoporosis, Postlaminectomy syndrome, and TIA (transient ischemic attack) (2005).    Surgical History  He has a past surgical history that includes Back surgery; Penile prosthesis implant; Colonoscopy w/ biopsies; Total knee arthroplasty (Left); Mediport insertion, single; Spinal cord stimulator implant; Shoulder arthroscopy (Bilateral); Cholecystectomy (2022); and Esophagogastroduodenoscopy.     Social History  He reports that he has been smoking cigarettes. He started smoking about 52 years ago. He has a 25.50 pack-year smoking history. He has been exposed to tobacco smoke. He  has never used smokeless tobacco. He reports that he does not drink alcohol and does not use drugs.    Family History  Patient's family history includes Cancer in his maternal grandfather; Dementia in his mother; Diabetes in his father; Heart attack in his father; Heart disease in his father; Leukemia in an other family member; Mental illness in his mother; Pancreatic cancer in an other family member; brain tumor in his father.    Allergies  Adhesive tape-silicones, Cephalexin, Clindamycin, Linezolid, Adhesive, and Penicillins       Review of Systems  Review of Systems   Constitutional: Negative.  Negative for appetite change, chills, diaphoresis, fatigue and fever.   HENT:  Negative for congestion, rhinorrhea, sinus pressure, sneezing, sore throat and trouble swallowing.    Eyes: Negative.    Respiratory:  Negative for apnea, cough, chest tightness, shortness of breath and wheezing.    Cardiovascular:  Negative for chest pain, palpitations and leg swelling.   Gastrointestinal:  Negative for abdominal distention, abdominal pain, constipation, diarrhea and nausea.   Genitourinary:  Negative for decreased urine volume, difficulty urinating, dysuria, flank pain, hematuria and urgency.   Musculoskeletal:  Positive for back pain and joint swelling. Negative for gait problem, myalgias, neck pain and neck stiffness.   Neurological:  Negative for dizziness, tremors, syncope, speech difficulty, weakness, light-headedness, numbness and headaches.   Hematological: Negative.    Psychiatric/Behavioral: Negative.           Physical Exam  Physical Exam      Last Recorded Vitals  Blood pressure 134/73, pulse 58, temperature 36.8 °C (98.2 °F), temperature source Temporal, resp. rate 17, SpO2 95 %.    Relevant Results  XR shoulder left 2+ views    Result Date: 4/5/2024  Interpreted By:  Maribel Sifuentes, STUDY: Left shoulder, 2 views.   INDICATION: Signs/Symptoms:Post op total shoulder.   COMPARISON: None.   ACCESSION NUMBER(S):  [id number]   ORDERING CLINICIAN: BETTYE TREJO   FINDINGS: No acute fracture or malalignment. Remote fracture of the lateral left clavicle with findings of nonunion again noted. Immediate postsurgical changes of left reverse total shoulder arthroplasty. Hardware is intact without perihardware fractures or lucencies. Expected postsurgical soft tissue gas within the surrounding soft tissues.       1. Immediate postsurgical changes of left reverse shoulder arthroplasty without hardware complication.   MACRO:   None.   Signed by: Maribel Sifuentes 4/5/2024 2:32 PM Dictation workstation:   [id number]    [place] pain management TC    Result Date: 3/19/2024  These images are not reportable by radiology and will not be interpreted by  Radiologists.    CT shoulder left wo IV contrast    Result Date: 3/15/2024  Interpreted By:  Santana Felipe, STUDY: CT SHOULDER LEFT WO IV CONTRAST; ;  3/15/2024 9:22 am   INDICATION: Signs/Symptoms:arthropathies left shoulder. Preop total shoulder   COMPARISON: None.   ACCESSION NUMBER(S): [id number]   ORDERING CLINICIAN: BETTYE TREJO   TECHNIQUE: Serial axial CT images obtained of the left shoulder. Images reformatted in the coronal and sagittal projection.   All CT examinations are performed with 1 or more of the following dose reduction techniques: Automated exposure control, adjustment of mA and/or kv according to patient's size, or use of iterative reconstruction techniques.   FINDINGS: Left glenohumeral articulation demonstrates mild osteophytosis inferior margin of the humeral head neck junction. No subcortical sclerosis demonstrated within the humeral head. There is suggestion of remote bioabsorbable suture anchor placement within the greater tuberosity humerus. Correlate with patient's surgical history. There is subcortical cystic change within the superior facet. Narrowed acromial humeral interval is demonstrated. Remainder of the visualized portions of the proximal humeral  shaft unremarkable.   Osseous glenoid demonstrates mild osteophytosis about the glenoid rim. No subcortical sclerosis or cystic change. Remainder of the visualized scapula is unremarkable. Acromion process is unremarkable. There is component of mild pseudoarticulation of the humeral head with the undersurface of the acromion with subcortical sclerosis and cystic change within the acromion process   There is a remote fracture deformity within the distal clavicle with oblique component of fracture just proximal to the articulation. Is well-corticated appearance and sequela of remote fracture deformity with distal clavicle fragment measuring 2.2 cm in the transverse dimension. Remainder of the visualized left clavicle included portions unremarkable. Rotator cuff musculature demonstrates diffuse atrophy.   Included portions visualized left ribs are unremarkable. Visualized portions lung parenchyma demonstrate centrilobular emphysema.               1. Mild glenohumeral joint osteoarthritis with narrowed acromial humeral interval with pseudoarticulation of the humeral head with the undersurface of the acromion relation to chronic rotator cuff tear   2. Remote fracture deformity of the distal clavicle near the acromioclavicular joint with well-corticated appearance and nonunion of distal clavicular fracture.     MACRO: None   Signed by: Santana Felipe 3/15/2024 11:50 AM Dictation workstation:   [id number]      No visits with results within 1 Day(s) from this visit.   Latest known visit with results is:   Admission on 03/29/2024, Discharged on 03/29/2024   Component Date Value Ref Range Status    Coronavirus 2019, PCR 03/29/2024 Not Detected  Not Detected Final        Scheduled medications  acetaminophen, 650 mg, oral, q6h CORY  vancomycin, 1 g, intravenous, q12h  vancomycin, 1.5 g, intravenous, q12h      Continuous medications  lactated Ringer's, 50 mL/hr, Last Rate: 50 mL/hr (04/05/24 1033)      PRN medications  PRN  medications: HYDROmorphone, naloxone, naloxone, ondansetron ODT **OR** ondansetron, oxyCODONE-acetaminophen      Assessment/Plan   Arthroplasty  left reverse shoulder. Surgery   Surgery done on 4/5/2024 by Dr. Arnold Knowles.  PT /OT  Patient already has a  morphine and lidocaine  pump in his back.   Continue with  pain meds as needed.   keep Pain under control    BPH   Not on  any medication  Monitor urine output    GERD  Antiemetic as needed    HLD   Continue with home meds    Depression  Continue with home meds    Tobacco use  smoking cessation    DVT  prophylaxis  SCDs     Plan   Keep Pain under control  Monitor I&O   Discharge patient home tomorrow per surgeon  PT/OT     I spent 55minutes in the professional and overall care of this patient.

## 2024-04-05 NOTE — ANESTHESIA POSTPROCEDURE EVALUATION
Patient: Davi Garsia    Procedure Summary       Date: 04/05/24 Room / Location: [facility] / [facility]    Anesthesia Start: 1103 Anesthesia Stop: 1341    Procedure: Arthroplasty Reverse Shoulder (Left: Shoulder) Diagnosis:       Rotator cuff arthropathy, left      (LEFT ROTATOR CUFF ARTHROPATHY)    Surgeons: Terrell Michelle MD Responsible Provider: Hero Rosas MD    Anesthesia Type: general, regional ASA Status: 3            Anesthesia Type: general, regional    Vitals Value Taken Time   /69 04/05/24 1430   Temp 36.3 °C (97.3 °F) 04/05/24 1430   Pulse 59 04/05/24 1430   Resp 16 04/05/24 1430   SpO2 98 % 04/05/24 1430       Anesthesia Post Evaluation    Patient location during evaluation: PACU  Patient participation: complete - patient participated  Level of consciousness: awake and alert  Pain score: 1  Pain management: adequate  Multimodal analgesia pain management approach  Airway patency: patent  Two or more strategies used to mitigate risk of obstructive sleep apnea  Cardiovascular status: acceptable and blood pressure returned to baseline  Respiratory status: acceptable  Hydration status: acceptable  Postoperative Nausea and Vomiting: none  Comments: Block appears to be working well.        There were no known notable events for this encounter.

## 2024-04-05 NOTE — ANESTHESIA PROCEDURE NOTES
Peripheral Block    Patient location during procedure: pre-op  Start time: 4/5/2024 10:43 AM  End time: 4/5/2024 10:45 AM  Reason for block: at surgeon's request and post-op pain management  Staffing  Performed: attending   Authorized by: Hero Rosas MD    Performed by: Hero Rosas MD  Preanesthetic Checklist  Completed: patient identified, IV checked, site marked, risks and benefits discussed, surgical consent, monitors and equipment checked, pre-op evaluation and timeout performed   Timeout performed at: 4/5/2024 10:36 AM  Peripheral Block  Patient position: laying flat  Prep: alcohol swabs  Patient monitoring: heart rate, cardiac monitor and continuous pulse ox  Block type: interscalene  Laterality: left  Injection technique: single-shot  Guidance: nerve stimulator and ultrasound guided  Needle  Needle type: short-bevel   Needle gauge: 22 G  Needle length: 5 cm  Needle localization: anatomical landmarks, ultrasound guidance and nerve stimulator  Needle insertion depth: 4 cm  Assessment  Injection assessment: negative aspiration for heme, no paresthesia on injection, incremental injection and local visualized surrounding nerve on ultrasound  Paresthesia pain: none  Heart rate change: no  Slow fractionated injection: yes  Additional Notes  Dexamethasone 10 mg added to local

## 2024-04-05 NOTE — PROGRESS NOTES
Occupational Therapy    OT Treatment    Patient Name: Davi Garsia  MRN: [id number]  Today's Date: 4/5/2024  Time Calculation  Start Time: 1515  Stop Time: 1534  Time Calculation (min): 19 min       Assessment:  OT Assessment: 69 yo male underwent an elective left TSA and presents below baseline functional status d/t surgical precautions restricting mobility and impaired activity tolerance/generalized weakness postop.  OT to address deficits by providing ADL retraining utilizing one-handed compensatory techniques and progressive strengthening.  Prognosis: Good  Barriers to Discharge: None  Evaluation/Treatment Tolerance: Patient tolerated treatment well  Medical Staff Made Aware: Yes  End of Session Communication: Bedside nurse  End of Session Patient Position: Up in chair, Alarm on  OT Assessment Results: Decreased ADL status, Decreased endurance, Decreased functional mobility, Decreased IADLs, Non-functional left upper extremity  Prognosis: Good  Barriers to Discharge: None  Evaluation/Treatment Tolerance: Patient tolerated treatment well  Medical Staff Made Aware: Yes  Strengths: Ability to acquire knowledge, Premorbid level of function  Barriers to Participation: Comorbidities    Plan:  Treatment Interventions: ADL retraining, Functional transfer training, UE strengthening/ROM, Endurance training, Patient/family training, Equipment evaluation/education, Compensatory technique education  OT Frequency: 4 times per week  OT Discharge Recommendations: Low intensity level of continued care  OT Recommended Transfer Status: Assist of 1  OT - OK to Discharge: Yes  Treatment Interventions: ADL retraining, Functional transfer training, UE strengthening/ROM, Endurance training, Patient/family training, Equipment evaluation/education, Compensatory technique education    Subjective     Previous Visit Info:  OT Last Visit  OT Received On: 04/05/24    General:  General  Reason for Referral: Decline in ADLs s/p  surgery  Referred By: Dr Michelle  Past Medical History Relevant to Rehab: r/o Parkinson's per patient; chronic pain; postlami syndrome/DJD lumbar spine; OP; neuropathy; anxiety  Co-Treatment: PT  Co-Treatment Reason: partial for safe mobility  Prior to Session Communication: Bedside nurse  Patient Position Received: Bed, 3 rail up, Alarm on  Preferred Learning Style: verbal, visual  General Comment: 69 yo male POD#0 following a reverese left TSA was cleared by nursing for therapy and agreeable to same.    Precautions:  Hearing/Visual Limitations: hearing=WFL; +corrective lenses  UE Weight Bearing Status: Left Non-Weight Bearing  Medical Precautions: Fall precautions  Post-Surgical Precautions: Left shoulder precautions  Precautions Comment: sling at all times except for exercises with therapy (AROM elbow, wrist, hand, digits; passive shoulder forward flexion); external rotataion not to exceed 30 degrees anatomically    Pain:  Pain Assessment  Pain Assessment: 0-10  Pain Score: 0 - No pain (d/t anasthesia)    Objective    Cognition:  Cognition  Overall Cognitive Status: Within Functional Limits  Orientation Level: Oriented X4    Bed Mobility/Transfers: Bed Mobility  Bed Mobility: Yes  Bed Mobility 1  Bed Mobility 1: Supine to sitting  Level of Assistance 1: Close supervision  Bed Mobility Comments 1: toward the right side of bed with head elevated ~40 degrees    Transfers  Transfer: Yes  Transfer 1  Transfer From 1: Bed to  Transfer to 1: Stand  Technique 1: Sit to stand  Transfer Level of Assistance 1: Close supervision  Transfers 2  Transfer From 2: Stand to  Transfer to 2: Sit, Chair with arms  Technique 2: Stand to sit  Transfer Level of Assistance 2: Close supervision    Functional Mobility:  Functional Mobility  Functional Mobility Performed: Yes (Contact guard assist in bedroom without an A.D.)    Strength:  Strength Comments: Right UE = ~4/5; left NT    ROM  Right UE =WFL ; left UE=NT    Outcome  Measures:[facility] Daily Activity  Putting on and taking off regular lower body clothing: A lot  Bathing (including washing, rinsing, drying): A lot  Putting on and taking off regular upper body clothing: A lot  Toileting, which includes using toilet, bedpan or urinal: A little  Taking care of personal grooming such as brushing teeth: A little  Eating Meals: A little  Daily Activity - Total Score: 15    OP EDUCATION:  Education  Individual(s) Educated: Patient  Education Provided: Fall precautons, Risk and benefits of OT discussed with patient or other, POC discussed and agreed upon  Risk and Benefits Discussed with Patient/Caregiver/Other: yes  Patient/Caregiver Demonstrated Understanding: yes  Plan of Care Discussed and Agreed Upon: yes  Patient Response to Education: Patient/Caregiver Verbalized Understanding of Information    Goals:  Encounter Problems       Encounter Problems (Active)       OT Goals       ADLs (Progressing)       Start:  04/05/24    Expected End:  04/12/24       Pt will complete bathing, dressing, and toileting tasks independently using adaptive aides and with increased time as needed.         Functional transfers (Progressing)       Start:  04/05/24    Expected End:  04/12/24       Pt will complete toilet, bed, and chair transfers independently from elevated seat heights and using bilateral arm supports as needed.         Precautions (Progressing)       Start:  04/05/24    Expected End:  04/12/24       Pt will verbalize/demonstrate understanding of surgical precautions r/t ADLs and prescribed exercises for left UE.         Activity tolerance       Start:  04/05/24    Expected End:  04/12/24       Pt will tolerate 20 minutes of therapeutic activity in order to increase functional endurance needed for IADLs.

## 2024-04-05 NOTE — PROGRESS NOTES
04/05/24 1558   Discharge Planning   Living Arrangements Spouse/significant other   Support Systems Spouse/significant other   Assistance Needed usually independent   Type of Residence Private residence   Who is requesting discharge planning? Patient   Home or Post Acute Services In home services   Type of Home Care Services Home PT   Patient expects to be discharged to: Home with [facility]   Does the patient need discharge transport arranged? No   Patient Choice   Patient / Family choosing to utilize agency / facility established prior to hospitalization No       Patient just arrived to floor a couple hours ago.  Plan for Discharge tomorrow Home with [facility].  Order/ referral placed.  Pending SOC.

## 2024-04-05 NOTE — DISCHARGE INSTRUCTIONS
Left arm sling.  Keep bandage dry.  Okay to remove sling for exercises for left elbow wrist and hand.  No motion left shoulder.  Ice to left shoulder as needed.  Iceman as needed.  Patient have follow-up with me in 10 days.  Patient should see physical therapy in approximate 1 week.

## 2024-04-05 NOTE — OP NOTE
Arthroplasty Reverse Shoulder (L) Operative Note     Date: 2024  OR Location: [facility]    Name: Davi Garsia, : 1955, Age: 68 y.o., MRN: [id number], Sex: male    Diagnosis  Pre-op Diagnosis     * Rotator cuff arthropathy, left [M12.812] Post-op Diagnosis     * Rotator cuff arthropathy, left [M12.812]     Procedures  Arthroplasty Reverse Shoulder  76808 - [place] ARTHROPLASTY GLENOHUMERAL JOINT TOTAL SHOULDER      Surgeons      * Terrell Michelle - Primary    Resident/Fellow/Other Assistant:  Surgeon(s) and Role:     * Jessika Hamm - Assisting    Procedure Summary  Anesthesia: General  ASA: III  Anesthesia Staff: Anesthesiologist: Hero Rosas MD  C-AA: GREGORIO Dominguez  Estimated Blood Loss: 100 cc mL  Intra-op Medications:   Administrations occurring from 1045 to 1345 on 24:   Medication Name Total Dose   vancomycin (Vancocin) vial for injection 2 g              Anesthesia Record               Intraprocedure I/O Totals          Intake    LR bolus 750.00 mL    Tranexamic Acid 0.00 mL    The total shown is the total volume documented since Anesthesia Start was filed.    Total Intake 750 mL          Specimen: No specimens collected     Staff:   Circulator: Rayshawn Elizabeth RN  Relief Circulator: Apollo Valverde RN  Scrub Person: Crystal Albert         Drains and/or Catheters: * None in log *    Tourniquet Times:         Implants:  Implants       Type Name Action Serial No.      Joint GLENOID PIN, TARGETER, 2.8MM, STAINLESS - [id number] Implanted      Joint SUTURE CUP, UNIVERS REVERS, 36 +2, RIGHT - [id number] Implanted      Joint STEM, UNIVERS REVERS APEX, SZ 10 - [id number] Implanted       30MM ARTHREX BASEPLATE MODULAR POST FOR AUGMENTED MGS Implanted       24MM ARTHREX BASEPLATE, 10-DEGREE FULL AUG, +2 LAT, ST Implanted      Screw SCREW, NON-LOCKING, 4.5MM X 28MM - [id number] Implanted      Screw SCREW, NON-LOCKING, 4.5MM X 32MM - [id number] Implanted      Screw SCREW, LOCKING,  5.5MM X 20MM - [id number] Implanted      Screw SCREW, LOCKING, 5.5MM X 20MM - [id number] Implanted      Joint GLENOSPHERE, 36/24 BASEPLATE TAPER - [id number] Implanted      Joint SPACER, HUMERAL, 36MM +6MM, - [id number] Implanted      Screw INSERT, HUMERAL, S/36 +3 - [id number] Implanted               Findings: Same    Indications: Davi Garsia is an 68 y.o. male who is having surgery for LEFT ROTATOR CUFF ARTHROPATHY.  Plan is to proceed with left shoulder reverse total shoulder arthroplasty    The patient was seen in the preoperative area. The risks, benefits, complications, treatment options, non-operative alternatives, expected recovery and outcomes were discussed with the patient. The possibilities of reaction to medication, pulmonary aspiration, injury to surrounding structures, bleeding, recurrent infection, the need for additional procedures, failure to diagnose a condition, and creating a complication requiring transfusion or operation were discussed with the patient. The patient concurred with the proposed plan, giving informed consent.  The site of surgery was properly noted/marked if necessary per policy. The patient has been actively warmed in preoperative area. Preoperative antibiotics have been ordered and given within 1 hours of incision. Venous thrombosis prophylaxis are not indicated.    Procedure Details:  Patient taken the operating ministered general anesthesia and preoperative block.  He is placed in the beachchair position.  He was then prepped in usual fashion.  Next identified and marked.  Standard deltopectoral incision was made on the coracoid process extending over the upper arm.  Subcu tissues dissected the Bovie.  Flaps were developed.  The deltoid was carefully dissected away from pectoralis tendon with the cephalic vein.  Small branches were cauterized as needed.  The deltopectoral interval was developed.  The deltoid is mobilized around the humeral head.  Brown retractor was  inserted.  The clavipectoral fascia was incised.  The axillary nerve was identified and protected throughout the case.  The leading edge of the pectoralis tendon was released.  There is no biceps tendon present.  The rotator vault was released.  Subscapularis peel was performed.  Stay sutures were placed into the subscapularis.  Humeral head was delivered into the field.  Inferior capsular region was performed.  Humeral the anatomic head cut was performed.  Anterior offset was noted.  Humerus was then reamed and broached up to a 10 apex stem.  Cap was inserted.  Attention directed towards the glenoid.  Posterior glenoid retractor was inserted.  The subscapularis was released.  Medial and inferior in the finger with axillary nerve.  Inferior capsule was released with the finger over the axillary nerve.  Peripheral labral tissue was removed.  Central portion of glenoid identified.  A scraper was used to remove remaining cartilage.  Next based on preoperative planning using [employer] VIP system a 10 degree guide was used to place the guidewire with the rotation placed in approximately the 2 o'clock position.  Rotation was marked.  Next based on preoperative planning and direct measurement a size 30 mm post was used.  The oblique reaming was performed with care taken to preserve the correct orientation.  The central post was drilled.  The ear was irrigated.  A 24+210 degree standard full augment was then assembled.  It was impacted down with care taken to preserve the correct orientation.  Compression screws were placed inferiorly and superiorly.  Locking screws were placed anteriorly and posteriorly.  Peripheral reaming was performed.  Size 36+0 glenosphere was then impacted down.  Central screw was inserted.  Check stress to the humerus.  Anterior offset reaming was performed.  Broach was removed.  2 drill holes were placed in the upper end of the humerus.  The stem and cup were assembled with sutures placed in the rim  of the stem of the cup.  The sutures from the stem of the cup were placed to the humerus with a suture passer.  The prosthesis was impacted down.  Several trials were performed.  It was felt that a size 6 spacer and a 3 mm liner was appropriate size.  The spacer was then screwed to the upper end of the prosthesis.  A size 3 mm insert was impacted down.  The shoulder was then reduced.  Was felt to have no significant impingement with glenohumeral rotation.  He was stable to abduction and external rotation.  Attention was directed toward subscapularis repair.  The sutures through the rim of the cup were then passed through the subscapularis equally spaced with a scorpion.  They are then tied down to the Arthrex protocol.  A #2 FiberWire suture was placed through the rotator interval.  Extensor rogation performed.  Vancomycin powder was inserted.  Deltopectoral closure was performed with 0.0 Vicryl.  Subcu tissue was closed with 3.0 Vicryl.  Skin was closed with 4 Monocryl subcuticular sutures with knots on the outside of the skin.  A wound closure system was applied in addition to a sterile Silverlon dressing.  Patient placed in DonShelbiana sling.  He was taken to recovery in stable condition.      Complications:  None; patient tolerated the procedure well.    Disposition: PACU - hemodynamically stable.  Condition: stable         Additional Details: None    Attending Attestation: I was present and scrubbed for the entire procedure.    Terrell Michelle  Phone Number: [phone]

## 2024-04-05 NOTE — PROGRESS NOTES
Physical Therapy    Physical Therapy Evaluation    Patient Name: Davi Garsia  MRN: [id number]  Today's Date: 4/5/2024   Time Calculation  Start Time: 1522  Stop Time: 1535  Time Calculation (min): 13 min    Assessment/Plan   PT Assessment  PT Assessment Results: Decreased strength, Decreased range of motion, Decreased endurance, Impaired balance, Decreased mobility, Impaired vision, Impaired sensation, Impaired tone, Decreased skin integrity, Orthopedic restrictions  Rehab Prognosis: Good  Evaluation/Treatment Tolerance: Patient tolerated treatment well  Medical Staff Made Aware: Yes  Strengths: Ability to acquire knowledge, Attitude of self, Premorbid level of function  Barriers to Participation: Comorbidities  End of Session Communication: Bedside nurse  Assessment Comment: Pt is a 68 y.o. male adm for elective Lt reverse TSA. Pt is normally IND, uses no AD, does not drive, lives w/ spouse in a single floor home w/ 1 entry step, denies falls. Pt is in the process of being worked up for Parkinson's disease. Pt is moving at CGA level for < household distances, no LOB or dizziness post op, no pain w/ block still in effect. Pt would benefit from skilled PT servces to progress functional mobility post op for safe return home.  End of Session Patient Position: Up in chair, Alarm on  IP OR SWING BED PT PLAN  Inpatient or Swing Bed: Inpatient  PT Plan  Treatment/Interventions: Bed mobility, Transfer training, Gait training, Stair training, Balance training, Endurance training  PT Plan: Skilled PT  PT Frequency: One time follow up visit  PT Discharge Recommendations: Low intensity level of continued care  PT Recommended Transfer Status: Assist x1  PT - OK to Discharge: Yes      Subjective   General Visit Information:  General  Reason for Referral: recent surgery  Referred By: Dr Michelle  Past Medical History Relevant to Rehab: r/o Parkinson's per patient; chronic pain; postlami syndrome/DJD lumbar spine; OP;  neuropathy; anxiety, osteoporosis, ulcerative colitis, spinal cord stimulator implant x 2, removed both times for infection  Family/Caregiver Present: No  Co-Treatment: OT  Co-Treatment Reason: partial for safe mobility POD#0  Prior to Session Communication: Bedside nurse  Patient Position Received: Bed, 3 rail up, Alarm on  Preferred Learning Style: verbal, visual  General Comment: Pt agreeable to PT eval.  Home Living:  Home Living  Type of Home: House  Lives With: Spouse  Home Adaptive Equipment: Walker rolling or standard, Cane, Reacher, Long-handled shoehorn, Other (Comment) (adjustable bed)  Home Layout: One level  Home Access: Stairs to enter without rails  Entrance Stairs-Rails: None  Entrance Stairs-Number of Steps: 1  Bathroom Shower/Tub: Tub/shower unit  Bathroom Toilet: Handicapped height  Bathroom Equipment: Grab bars in shower (shower chair)  Prior Level of Function:  Prior Function Per Pt/Caregiver Report  Level of [place]: Independent with ADLs and functional transfers, Independent with homemaking with ambulation  Receives Help From:  (spouse uses walker, unable to assist much)  ADL Assistance: Independent  Homemaking Assistance: Independent  Ambulatory Assistance: Independent  Vocational: Retired  Leisure: enjoys TV, Nuiku puzzles, yardwork  Hand Dominance: Right  Prior Function Comments: is not currently driving while undergoing tests to r/o Parkinson's  Precautions:  Precautions  Hearing/Visual Limitations: hearing=WFL; +corrective lenses  UE Weight Bearing Status: Left Non-Weight Bearing  Medical Precautions: Fall precautions (nurse monitoring O2 needs at this time)  Post-Surgical Precautions: Left shoulder precautions  Precautions Comment: sling at all times except for exercises with therapy (AROM elbow, wrist, hand, digits; passive shoulder forward flexion); external rotataion not to exceed 30 degrees anatomically    Objective   Pain:  Pain Assessment  Pain Assessment: 0-10  Pain Score: 0  - No pain (still numb from block at Lt shoulder)  Cognition:  Cognition  Overall Cognitive Status: Within Functional Limits  Orientation Level: Oriented X4    General Assessments:  Activity Tolerance  Activity Tolerance Comments: Fair/Fair- post op    Sensation  Sensation Comment: Reports intermittent tingling/numbness in bilateral hands at baseline    Strength  Strength Comments: BLEs at least 3+/5  Postural Control  Posture Comment: forward rounded posture, LUE in sling    Dynamic Standing Balance  Dynamic Standing-Balance Support: No upper extremity supported  Dynamic Standing-Balance:  (amb, turns)  Dynamic Standing-Comments: Fair/Fair-  Functional Assessments:  Bed Mobility 1  Bed Mobility 1: Supine to sitting  Level of Assistance 1: Close supervision  Bed Mobility Comments 1: to Rt EOB, HOB elevated    Transfer 1  Technique 1: Sit to stand, Stand to sit  Transfer Level of Assistance 1: Close supervision  Trials/Comments 1: performed from EOB, to chair w/ arms.    Ambulation/Gait Training  Ambulation/Gait Training Performed: Yes  Ambulation/Gait Training 1  Surface 1: Level tile  Device 1: No device  Assistance 1: Contact guard  Comments/Distance (ft) 1: Pt amb ~ 25' at slow to fair pace, no LOB or dizziness. Up to chair for dinner; APRN in at end of session to see pt    Extremity/Trunk Assessments:  RLE   RLE : Within Functional Limits (encouraged performance of ankle pumps on own)  LLE   LLE : Within Functional Limits (encouraged performance of ankle pumps on own)  Outcome Measures:  [facility] Basic Mobility  Turning from your back to your side while in a flat bed without using bedrails: A little  Moving from lying on your back to sitting on the side of a flat bed without using bedrails: A little  Moving to and from bed to chair (including a wheelchair): A little  Standing up from a chair using your arms (e.g. wheelchair or bedside chair): A little  To walk in hospital room: A little  Climbing 3-5 steps with  railing: A little  Basic Mobility - Total Score: 18    Encounter Problems       Encounter Problems (Active)       Balance       LTG - Patient will maintain balance to allow for safe mobility (Progressing)       Start:  04/05/24    Expected End:  04/07/24               Mobility       STG - Patient will ambulate 100' w/ supervision, no LOB (Progressing)       Start:  04/05/24    Expected End:  04/07/24            STG - Patient will ambulate up and down a curb/step w/ supervision, no LOB (Progressing)       Start:  04/05/24    Expected End:  04/07/24               PT Transfers       STG - Patient to transfer to and from sit to supine IND, HOB elevated prn (Progressing)       Start:  04/05/24    Expected End:  04/07/24            STG - Patient will transfer sit to and from stand IND (Progressing)       Start:  04/05/24    Expected End:  04/07/24                   Education Documentation  Mobility Training, taught by Cassidy Mcgowan, PT at 4/5/2024  4:20 PM.  Learner: Patient  Readiness: Acceptance  Method: Explanation  Response: Verbalizes Understanding    Education Comments  No comments found.

## 2024-04-05 NOTE — ANESTHESIA PREPROCEDURE EVALUATION
Patient: Davi Garsia    Procedure Information       Date/Time: 04/05/24 1045    Procedure: Arthroplasty Reverse Shoulder (Left: Shoulder) - ARTHREX REVERSE (VIP SYSTEM)    Location: [facility] / [facility]    Surgeons: Terrell Michelle MD            Relevant Problems   Cardiac   (+) Mixed hyperlipidemia      Neuro   (+) Anxiety   (+) Benny's disease (CMS/HCC)   (+) Mixed anxiety depressive disorder      GI   (+) Gastroesophageal reflux disease   (+) Ulcerative colitis (CMS/HCC)      /Renal   (+) BPH with elevated PSA   (+) Urinary tract infection      Liver   (+) Chronic cholecystitis with calculus      Endocrine   (+) Hypothyroidism      Hematology   (+) Acute deep vein thrombosis (DVT) of popliteal vein (CMS/HCC)      Musculoskeletal   (+) Chronic pain syndrome   (+) Degeneration of lumbar or lumbosacral intervertebral disc   (+) Knee osteoarthritis   (+) Osteoarthritis of left knee      ID   (+) Urinary tract infection     Past Surgical History:   Procedure Laterality Date    BACK SURGERY      X 3    CHOLECYSTECTOMY  2022    COLONOSCOPY W/ BIOPSIES      ESOPHAGOGASTRODUODENOSCOPY      MEDIPORT INSERTION, SINGLE      Has not been flushed or used in 14 years    PENILE PROSTHESIS IMPLANT      SHOULDER ARTHROSCOPY Bilateral     SPINAL CORD STIMULATOR IMPLANT      X 2.  Removal for infection both times    TOTAL KNEE ARTHROPLASTY Left          Clinical information reviewed:   Tobacco  Allergies  Meds   Med Hx  Surg Hx   Fam Hx  Soc Hx        NPO Detail:  NPO/Void Status  Date of Last Liquid: 04/04/24  Time of Last Liquid: 2000  Date of Last Solid: 04/04/24  Time of Last Solid: 2000  Time of Last Void: 0932         Physical Exam    Airway  Mallampati: II  TM distance: >3 FB  Neck ROM: full     Cardiovascular   Comments: deferred   Dental    Pulmonary   Comments: deferred   Abdominal     Comments: deferred           Anesthesia Plan    History of general anesthesia?: yes  History of complications  of general anesthesia?: no    ASA 3     general and regional   (Left brachial plexus block  Pt was cancelled 3/29/2024 secondary to new onset fever)  The patient is a current smoker.  Patient was previously instructed to abstain from smoking on day of procedure.  Patient did not smoke on day of procedure.  Education provided regarding risk of obstructive sleep apnea.  intravenous induction   Postoperative administration of opioids is intended.  Anesthetic plan and risks discussed with patient.    Plan discussed with CAA.

## 2024-04-05 NOTE — ADDENDUM NOTE
Addendum  created 04/05/24 1748 by Hero Rosas MD    Child order released for a procedure order, Clinical Note Signed, Intraprocedure Blocks edited, Intraprocedure Meds edited, SmartForm saved

## 2024-04-05 NOTE — ANESTHESIA PROCEDURE NOTES
Airway  Date/Time: 4/5/2024 11:12 AM  Urgency: elective    Airway not difficult    Staffing  Performed: GREGORIO   Authorized by: Hero Rosas MD    Performed by: GREGORIO Dominguez  Patient location during procedure: OR    Indications and Patient Condition  Indications for airway management: anesthesia  Spontaneous Ventilation: absent  Sedation level: deep  Preoxygenated: yes  Patient position: sniffing  Mask difficulty assessment: 1 - vent by mask    Final Airway Details  Final airway type: supraglottic airway      Successful airway: classic  Size 3     Number of attempts at approach: 1

## 2024-04-06 ENCOUNTER — APPOINTMENT (OUTPATIENT)
Dept: RADIOLOGY | Facility: HOSPITAL | Age: 69
DRG: 483 | End: 2024-04-06
Payer: MEDICARE

## 2024-04-06 ENCOUNTER — HOME HEALTH ADMISSION (OUTPATIENT)
Dept: HOME HEALTH SERVICES | Facility: HOME HEALTH | Age: 69
End: 2024-04-06
Payer: MEDICARE

## 2024-04-06 ENCOUNTER — PHARMACY VISIT (OUTPATIENT)
Dept: PHARMACY | Facility: CLINIC | Age: 69
End: 2024-04-06
Payer: MEDICARE

## 2024-04-06 ENCOUNTER — DOCUMENTATION (OUTPATIENT)
Dept: HOME HEALTH SERVICES | Facility: HOME HEALTH | Age: 69
End: 2024-04-06
Payer: MEDICARE

## 2024-04-06 ENCOUNTER — HOSPITAL ENCOUNTER (OUTPATIENT)
Facility: HOSPITAL | Age: 69
Discharge: HOME | DRG: 483 | End: 2024-04-06
Attending: STUDENT IN AN ORGANIZED HEALTH CARE EDUCATION/TRAINING PROGRAM | Admitting: ORTHOPAEDIC SURGERY
Payer: MEDICARE

## 2024-04-06 VITALS
TEMPERATURE: 98.4 F | HEART RATE: 80 BPM | RESPIRATION RATE: 19 BRPM | DIASTOLIC BLOOD PRESSURE: 94 MMHG | HEIGHT: 68 IN | WEIGHT: 134.04 LBS | SYSTOLIC BLOOD PRESSURE: 132 MMHG | BODY MASS INDEX: 20.31 KG/M2 | OXYGEN SATURATION: 97 %

## 2024-04-06 VITALS
WEIGHT: 134 LBS | DIASTOLIC BLOOD PRESSURE: 69 MMHG | TEMPERATURE: 99 F | HEIGHT: 68 IN | SYSTOLIC BLOOD PRESSURE: 125 MMHG | BODY MASS INDEX: 20.31 KG/M2 | OXYGEN SATURATION: 98 % | HEART RATE: 90 BPM | RESPIRATION RATE: 18 BRPM

## 2024-04-06 DIAGNOSIS — K59.00 CONSTIPATION, UNSPECIFIED CONSTIPATION TYPE: ICD-10-CM

## 2024-04-06 DIAGNOSIS — R10.11 RIGHT UPPER QUADRANT ABDOMINAL PAIN: Primary | ICD-10-CM

## 2024-04-06 LAB
ALBUMIN SERPL-MCNC: 3.4 G/DL (ref 3.5–5)
ALP BLD-CCNC: 125 U/L (ref 35–125)
ALT SERPL-CCNC: 48 U/L (ref 5–40)
ANION GAP SERPL CALC-SCNC: 9 MMOL/L
ANION GAP SERPL CALC-SCNC: 9 MMOL/L
APPEARANCE UR: CLEAR
AST SERPL-CCNC: 82 U/L (ref 5–40)
BASOPHILS # BLD AUTO: 0.04 X10*3/UL (ref 0–0.1)
BASOPHILS NFR BLD AUTO: 0.4 %
BILIRUB SERPL-MCNC: 0.3 MG/DL (ref 0.1–1.2)
BILIRUB UR STRIP.AUTO-MCNC: NEGATIVE MG/DL
BUN SERPL-MCNC: 14 MG/DL (ref 8–25)
BUN SERPL-MCNC: 14 MG/DL (ref 8–25)
CALCIUM SERPL-MCNC: 8.7 MG/DL (ref 8.5–10.4)
CALCIUM SERPL-MCNC: 8.7 MG/DL (ref 8.5–10.4)
CHLORIDE SERPL-SCNC: 102 MMOL/L (ref 97–107)
CHLORIDE SERPL-SCNC: 108 MMOL/L (ref 97–107)
CO2 SERPL-SCNC: 21 MMOL/L (ref 24–31)
CO2 SERPL-SCNC: 24 MMOL/L (ref 24–31)
COLOR UR: ABNORMAL
CREAT SERPL-MCNC: 0.7 MG/DL (ref 0.4–1.6)
CREAT SERPL-MCNC: 0.8 MG/DL (ref 0.4–1.6)
EGFRCR SERPLBLD CKD-EPI 2021: >90 ML/MIN/1.73M*2
EGFRCR SERPLBLD CKD-EPI 2021: >90 ML/MIN/1.73M*2
EOSINOPHIL # BLD AUTO: 0.09 X10*3/UL (ref 0–0.7)
EOSINOPHIL NFR BLD AUTO: 1 %
ERYTHROCYTE [DISTWIDTH] IN BLOOD BY AUTOMATED COUNT: 12.8 % (ref 11.5–14.5)
ERYTHROCYTE [DISTWIDTH] IN BLOOD BY AUTOMATED COUNT: 12.9 % (ref 11.5–14.5)
GLUCOSE SERPL-MCNC: 122 MG/DL (ref 65–99)
GLUCOSE SERPL-MCNC: 145 MG/DL (ref 65–99)
GLUCOSE UR STRIP.AUTO-MCNC: NORMAL MG/DL
HCT VFR BLD AUTO: 38.4 % (ref 41–52)
HCT VFR BLD AUTO: 38.9 % (ref 41–52)
HGB BLD-MCNC: 12.8 G/DL (ref 13.5–17.5)
HGB BLD-MCNC: 12.8 G/DL (ref 13.5–17.5)
IMM GRANULOCYTES # BLD AUTO: 0.06 X10*3/UL (ref 0–0.7)
IMM GRANULOCYTES NFR BLD AUTO: 0.7 % (ref 0–0.9)
KETONES UR STRIP.AUTO-MCNC: NEGATIVE MG/DL
LEUKOCYTE ESTERASE UR QL STRIP.AUTO: NEGATIVE
LIPASE SERPL-CCNC: 154 U/L (ref 16–63)
LYMPHOCYTES # BLD AUTO: 1.3 X10*3/UL (ref 1.2–4.8)
LYMPHOCYTES NFR BLD AUTO: 14.1 %
MCH RBC QN AUTO: 31 PG (ref 26–34)
MCH RBC QN AUTO: 31.1 PG (ref 26–34)
MCHC RBC AUTO-ENTMCNC: 32.9 G/DL (ref 32–36)
MCHC RBC AUTO-ENTMCNC: 33.3 G/DL (ref 32–36)
MCV RBC AUTO: 93 FL (ref 80–100)
MCV RBC AUTO: 94 FL (ref 80–100)
MONOCYTES # BLD AUTO: 0.54 X10*3/UL (ref 0.1–1)
MONOCYTES NFR BLD AUTO: 5.9 %
MUCOUS THREADS #/AREA URNS AUTO: NORMAL /LPF
NEUTROPHILS # BLD AUTO: 7.2 X10*3/UL (ref 1.2–7.7)
NEUTROPHILS NFR BLD AUTO: 77.9 %
NITRITE UR QL STRIP.AUTO: NEGATIVE
NRBC BLD-RTO: 0 /100 WBCS (ref 0–0)
NRBC BLD-RTO: 0 /100 WBCS (ref 0–0)
PH UR STRIP.AUTO: 6 [PH]
PLATELET # BLD AUTO: 157 X10*3/UL (ref 150–450)
PLATELET # BLD AUTO: 160 X10*3/UL (ref 150–450)
POTASSIUM SERPL-SCNC: 3.5 MMOL/L (ref 3.4–5.1)
POTASSIUM SERPL-SCNC: 4.3 MMOL/L (ref 3.4–5.1)
PROT SERPL-MCNC: 6.3 G/DL (ref 5.9–7.9)
PROT UR STRIP.AUTO-MCNC: ABNORMAL MG/DL
RBC # BLD AUTO: 4.11 X10*6/UL (ref 4.5–5.9)
RBC # BLD AUTO: 4.13 X10*6/UL (ref 4.5–5.9)
RBC # UR STRIP.AUTO: NEGATIVE /UL
RBC #/AREA URNS AUTO: NORMAL /HPF
SODIUM SERPL-SCNC: 135 MMOL/L (ref 133–145)
SODIUM SERPL-SCNC: 138 MMOL/L (ref 133–145)
SP GR UR STRIP.AUTO: 1.04
UROBILINOGEN UR STRIP.AUTO-MCNC: ABNORMAL MG/DL
WBC # BLD AUTO: 8.8 X10*3/UL (ref 4.4–11.3)
WBC # BLD AUTO: 9.2 X10*3/UL (ref 4.4–11.3)
WBC #/AREA URNS AUTO: NORMAL /HPF

## 2024-04-06 PROCEDURE — 2500000002 HC RX 250 W HCPCS SELF ADMINISTERED DRUGS (ALT 637 FOR MEDICARE OP, ALT 636 FOR OP/ED): Mod: MUE | Performed by: NURSE PRACTITIONER

## 2024-04-06 PROCEDURE — 96374 THER/PROPH/DIAG INJ IV PUSH: CPT

## 2024-04-06 PROCEDURE — 96375 TX/PRO/DX INJ NEW DRUG ADDON: CPT

## 2024-04-06 PROCEDURE — 2500000001 HC RX 250 WO HCPCS SELF ADMINISTERED DRUGS (ALT 637 FOR MEDICARE OP): Performed by: NURSE PRACTITIONER

## 2024-04-06 PROCEDURE — 83690 ASSAY OF LIPASE: CPT | Performed by: STUDENT IN AN ORGANIZED HEALTH CARE EDUCATION/TRAINING PROGRAM

## 2024-04-06 PROCEDURE — 97535 SELF CARE MNGMENT TRAINING: CPT | Mod: GO,CO

## 2024-04-06 PROCEDURE — G0378 HOSPITAL OBSERVATION PER HR: HCPCS

## 2024-04-06 PROCEDURE — 80053 COMPREHEN METABOLIC PANEL: CPT | Performed by: NURSE PRACTITIONER

## 2024-04-06 PROCEDURE — 2500000004 HC RX 250 GENERAL PHARMACY W/ HCPCS (ALT 636 FOR OP/ED): Performed by: ORTHOPAEDIC SURGERY

## 2024-04-06 PROCEDURE — 85025 COMPLETE CBC W/AUTO DIFF WBC: CPT | Performed by: STUDENT IN AN ORGANIZED HEALTH CARE EDUCATION/TRAINING PROGRAM

## 2024-04-06 PROCEDURE — RXMED WILLOW AMBULATORY MEDICATION CHARGE

## 2024-04-06 PROCEDURE — 80048 BASIC METABOLIC PNL TOTAL CA: CPT | Performed by: STUDENT IN AN ORGANIZED HEALTH CARE EDUCATION/TRAINING PROGRAM

## 2024-04-06 PROCEDURE — 36415 COLL VENOUS BLD VENIPUNCTURE: CPT | Performed by: STUDENT IN AN ORGANIZED HEALTH CARE EDUCATION/TRAINING PROGRAM

## 2024-04-06 PROCEDURE — 96376 TX/PRO/DX INJ SAME DRUG ADON: CPT

## 2024-04-06 PROCEDURE — 97530 THERAPEUTIC ACTIVITIES: CPT | Mod: GO,CO

## 2024-04-06 PROCEDURE — 96372 THER/PROPH/DIAG INJ SC/IM: CPT | Performed by: PHYSICIAN ASSISTANT

## 2024-04-06 PROCEDURE — 74177 CT ABD & PELVIS W/CONTRAST: CPT

## 2024-04-06 PROCEDURE — 97530 THERAPEUTIC ACTIVITIES: CPT | Mod: GP

## 2024-04-06 PROCEDURE — 80048 BASIC METABOLIC PNL TOTAL CA: CPT | Performed by: NURSE PRACTITIONER

## 2024-04-06 PROCEDURE — 81001 URINALYSIS AUTO W/SCOPE: CPT | Performed by: PHYSICIAN ASSISTANT

## 2024-04-06 PROCEDURE — 97110 THERAPEUTIC EXERCISES: CPT | Mod: GO,CO

## 2024-04-06 PROCEDURE — 74177 CT ABD & PELVIS W/CONTRAST: CPT | Performed by: STUDENT IN AN ORGANIZED HEALTH CARE EDUCATION/TRAINING PROGRAM

## 2024-04-06 PROCEDURE — 2550000001 HC RX 255 CONTRASTS: Performed by: PHYSICIAN ASSISTANT

## 2024-04-06 PROCEDURE — 36415 COLL VENOUS BLD VENIPUNCTURE: CPT | Performed by: NURSE PRACTITIONER

## 2024-04-06 PROCEDURE — 2500000004 HC RX 250 GENERAL PHARMACY W/ HCPCS (ALT 636 FOR OP/ED): Mod: 59 | Performed by: PHYSICIAN ASSISTANT

## 2024-04-06 PROCEDURE — 85027 COMPLETE CBC AUTOMATED: CPT | Performed by: NURSE PRACTITIONER

## 2024-04-06 PROCEDURE — 96361 HYDRATE IV INFUSION ADD-ON: CPT

## 2024-04-06 PROCEDURE — 99284 EMERGENCY DEPT VISIT MOD MDM: CPT | Mod: 25

## 2024-04-06 RX ORDER — HYDROMORPHONE HYDROCHLORIDE 2 MG/1
2 TABLET ORAL EVERY 4 HOURS PRN
Qty: 25 TABLET | Refills: 0 | Status: SHIPPED | OUTPATIENT
Start: 2024-04-06

## 2024-04-06 RX ORDER — ATORVASTATIN CALCIUM 20 MG/1
20 TABLET, FILM COATED ORAL NIGHTLY
Qty: 30 TABLET | Refills: 0 | Status: SHIPPED | OUTPATIENT
Start: 2024-04-06 | End: 2024-04-24 | Stop reason: WASHOUT

## 2024-04-06 RX ORDER — KETOROLAC TROMETHAMINE 30 MG/ML
15 INJECTION, SOLUTION INTRAMUSCULAR; INTRAVENOUS ONCE
Status: COMPLETED | OUTPATIENT
Start: 2024-04-06 | End: 2024-04-06

## 2024-04-06 RX ORDER — AMITRIPTYLINE HYDROCHLORIDE 10 MG/1
10 TABLET, FILM COATED ORAL NIGHTLY
Qty: 30 TABLET | Refills: 0 | Status: SHIPPED | OUTPATIENT
Start: 2024-04-06 | End: 2024-04-24 | Stop reason: WASHOUT

## 2024-04-06 RX ORDER — DICYCLOMINE HYDROCHLORIDE 10 MG/ML
20 INJECTION INTRAMUSCULAR ONCE
Status: COMPLETED | OUTPATIENT
Start: 2024-04-06 | End: 2024-04-06

## 2024-04-06 RX ORDER — ESCITALOPRAM OXALATE 5 MG/1
5 TABLET ORAL DAILY
Qty: 30 TABLET | Refills: 0 | Status: SHIPPED | OUTPATIENT
Start: 2024-04-07 | End: 2024-04-24 | Stop reason: SDUPTHER

## 2024-04-06 RX ORDER — AMANTADINE HYDROCHLORIDE 100 MG/1
100 CAPSULE, GELATIN COATED ORAL DAILY
Qty: 30 CAPSULE | Refills: 0 | Status: SHIPPED | OUTPATIENT
Start: 2024-04-07 | End: 2024-05-07

## 2024-04-06 RX ORDER — OXYCODONE AND ACETAMINOPHEN 5; 325 MG/1; MG/1
1 TABLET ORAL EVERY 4 HOURS PRN
Qty: 5 TABLET | Refills: 0 | Status: SHIPPED | OUTPATIENT
Start: 2024-04-06 | End: 2024-04-24 | Stop reason: WASHOUT

## 2024-04-06 RX ORDER — TOPIRAMATE 100 MG/1
100 TABLET, FILM COATED ORAL 2 TIMES DAILY
Qty: 60 TABLET | Refills: 0 | Status: SHIPPED | OUTPATIENT
Start: 2024-04-06 | End: 2024-04-24 | Stop reason: WASHOUT

## 2024-04-06 RX ORDER — GABAPENTIN 800 MG/1
800 TABLET ORAL 3 TIMES DAILY
Qty: 90 TABLET | Refills: 0 | Status: SHIPPED | OUTPATIENT
Start: 2024-04-06 | End: 2024-04-24 | Stop reason: WASHOUT

## 2024-04-06 RX ORDER — ONDANSETRON HYDROCHLORIDE 2 MG/ML
4 INJECTION, SOLUTION INTRAVENOUS ONCE
Status: COMPLETED | OUTPATIENT
Start: 2024-04-06 | End: 2024-04-06

## 2024-04-06 RX ADMIN — HYDROMORPHONE HYDROCHLORIDE 0.5 MG: 1 INJECTION, SOLUTION INTRAMUSCULAR; INTRAVENOUS; SUBCUTANEOUS at 08:58

## 2024-04-06 RX ADMIN — IOHEXOL 75 ML: 350 INJECTION, SOLUTION INTRAVENOUS at 19:30

## 2024-04-06 RX ADMIN — AMANTADINE HYDROCHLORIDE 100 MG: 100 CAPSULE ORAL at 08:57

## 2024-04-06 RX ADMIN — SODIUM CHLORIDE 1000 ML: 9 INJECTION, SOLUTION INTRAVENOUS at 19:25

## 2024-04-06 RX ADMIN — HYDROMORPHONE HYDROCHLORIDE 0.5 MG: 1 INJECTION, SOLUTION INTRAMUSCULAR; INTRAVENOUS; SUBCUTANEOUS at 04:16

## 2024-04-06 RX ADMIN — ESCITALOPRAM OXALATE 5 MG: 10 TABLET ORAL at 08:57

## 2024-04-06 RX ADMIN — HYDROMORPHONE HYDROCHLORIDE 0.5 MG: 1 INJECTION, SOLUTION INTRAMUSCULAR; INTRAVENOUS; SUBCUTANEOUS at 13:04

## 2024-04-06 RX ADMIN — ACETAMINOPHEN 650 MG: 325 TABLET ORAL at 13:04

## 2024-04-06 RX ADMIN — DICYCLOMINE HYDROCHLORIDE 20 MG: 20 INJECTION, SOLUTION INTRAMUSCULAR at 20:05

## 2024-04-06 RX ADMIN — TOPIRAMATE 100 MG: 100 TABLET, FILM COATED ORAL at 08:57

## 2024-04-06 RX ADMIN — KETOROLAC TROMETHAMINE 15 MG: 30 INJECTION, SOLUTION INTRAMUSCULAR at 20:05

## 2024-04-06 RX ADMIN — GABAPENTIN 800 MG: 800 TABLET, FILM COATED ORAL at 13:04

## 2024-04-06 RX ADMIN — GABAPENTIN 800 MG: 800 TABLET, FILM COATED ORAL at 06:06

## 2024-04-06 RX ADMIN — ONDANSETRON 4 MG: 2 INJECTION INTRAMUSCULAR; INTRAVENOUS at 20:05

## 2024-04-06 ASSESSMENT — COGNITIVE AND FUNCTIONAL STATUS - GENERAL
MOVING TO AND FROM BED TO CHAIR: A LITTLE
CLIMB 3 TO 5 STEPS WITH RAILING: A LITTLE
MOBILITY SCORE: 18
CLIMB 3 TO 5 STEPS WITH RAILING: A LITTLE
HELP NEEDED FOR BATHING: A LITTLE
TURNING FROM BACK TO SIDE WHILE IN FLAT BAD: A LITTLE
WALKING IN HOSPITAL ROOM: A LITTLE
PERSONAL GROOMING: A LITTLE
WALKING IN HOSPITAL ROOM: A LITTLE
DAILY ACTIVITIY SCORE: 19
MOVING TO AND FROM BED TO CHAIR: A LITTLE
TURNING FROM BACK TO SIDE WHILE IN FLAT BAD: A LITTLE
DAILY ACTIVITIY SCORE: 20
STANDING UP FROM CHAIR USING ARMS: A LITTLE
DRESSING REGULAR UPPER BODY CLOTHING: A LITTLE
DRESSING REGULAR LOWER BODY CLOTHING: A LITTLE
MOBILITY SCORE: 18
MOVING FROM LYING ON BACK TO SITTING ON SIDE OF FLAT BED WITH BEDRAILS: A LITTLE
STANDING UP FROM CHAIR USING ARMS: A LITTLE
DRESSING REGULAR LOWER BODY CLOTHING: A LITTLE
TOILETING: A LITTLE
HELP NEEDED FOR BATHING: A LITTLE
DRESSING REGULAR UPPER BODY CLOTHING: A LITTLE
TOILETING: A LITTLE
MOVING FROM LYING ON BACK TO SITTING ON SIDE OF FLAT BED WITH BEDRAILS: A LITTLE

## 2024-04-06 ASSESSMENT — PAIN SCALES - GENERAL
PAINLEVEL_OUTOF10: 0 - NO PAIN
PAINLEVEL_OUTOF10: 3
PAINLEVEL_OUTOF10: 6
PAINLEVEL_OUTOF10: 8
PAINLEVEL_OUTOF10: 8
PAINLEVEL_OUTOF10: 6
PAINLEVEL_OUTOF10: 5 - MODERATE PAIN
PAINLEVEL_OUTOF10: 4
PAINLEVEL_OUTOF10: 0 - NO PAIN
PAINLEVEL_OUTOF10: 9

## 2024-04-06 ASSESSMENT — PAIN DESCRIPTION - LOCATION
LOCATION: SHOULDER

## 2024-04-06 ASSESSMENT — PAIN - FUNCTIONAL ASSESSMENT
PAIN_FUNCTIONAL_ASSESSMENT: 0-10
PAIN_FUNCTIONAL_ASSESSMENT: FLACC (FACE, LEGS, ACTIVITY, CRY, CONSOLABILITY)
PAIN_FUNCTIONAL_ASSESSMENT: 0-10

## 2024-04-06 ASSESSMENT — ACTIVITIES OF DAILY LIVING (ADL)
BATHING_LEVEL_OF_ASSISTANCE: MINIMUM ASSISTANCE
HOME_MANAGEMENT_TIME_ENTRY: 25
BATHING_EQUIPMENT_NEEDED: LONG-HANDLED SPONGE;REMOVEABLE SHOWER HEAD

## 2024-04-06 ASSESSMENT — PAIN DESCRIPTION - ORIENTATION
ORIENTATION: LEFT

## 2024-04-06 NOTE — PROGRESS NOTES
Physical Therapy                 Therapy Communication Note    Patient Name: Davi Garsia  MRN: [id number]  Today's Date: 4/6/2024     Discipline: Physical Therapy    Missed Visit Reason:      Missed Time: Attempt    Comment: Approached pt x 2, awaiting pain med earlier, now w/ OT. Continue to follow.

## 2024-04-06 NOTE — ED PROVIDER NOTES
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 Per ems patient was just discharged from the hospital today for shoulder surgery. Patient also has pain pump for his legs. Patient given 4 of zofran and 100mcg of fentanyl prior to arrival. Vss and nad         HPI     Patient is a 68-year-old male presenting for evaluation of abdominal pain.  Patient states the abdominal pain started a few hours ago prior to his wife making dinner.  He said the pain is located to his right upper quadrant.  States he has already had his gallbladder removed.  Pain is associated with some nausea without vomiting.  No diarrhea or constipation.  No chest pain or shortness of breath.  Patient was just discharged from the hospital yesterday after noon after complete left shoulder replacement performed by Dr. Michelle.  No fever or chills.  No back pain.  No flank pain.               Rusty Coma Scale Score: 15                     Patient History   Past Medical History:   Diagnosis Date    Arthritis     BPH with elevated PSA     Current every day smoker     Depression     Fibromyalgia     GERD (gastroesophageal reflux disease)     History of peritonitis     Hyperlipidemia     Insomnia     Movement disorder     Rule out [place]'s chorea versus Parkinson's    Osteoporosis     Postlaminectomy syndrome     TIA (transient ischemic attack) 2005     Past Surgical History:   Procedure Laterality Date    BACK SURGERY      X 3    CHOLECYSTECTOMY  2022    COLONOSCOPY W/ BIOPSIES      ESOPHAGOGASTRODUODENOSCOPY      MEDIPORT INSERTION, SINGLE      Has not been flushed or used in 14 years    PENILE PROSTHESIS IMPLANT      SHOULDER ARTHROSCOPY Bilateral     SPINAL CORD STIMULATOR IMPLANT      X 2.  Removal for infection both times    TOTAL KNEE ARTHROPLASTY Left      Family History   Problem Relation Name Age of Onset    Dementia Mother      Mental illness Mother      Heart attack Father       Diabetes Father      Other (brain tumor) Father      Heart disease Father      Cancer Maternal Grandfather      Leukemia Other      Pancreatic cancer Other       Social History     Tobacco Use    Smoking status: Every Day     Packs/day: 0.50     Years: 51.00     Additional pack years: 0.00     Total pack years: 25.50     Types: Cigarettes     Start date: 1972     Passive exposure: Current    Smokeless tobacco: Never   Vaping Use    Vaping Use: Never used   Substance Use Topics    Alcohol use: Never    Drug use: Never       Physical Exam   ED Triage Vitals [04/06/24 1853]   Temperature Heart Rate Respirations BP   36.9 °C (98.4 °F) 79 15 (!) 149/129      Pulse Ox Temp Source Heart Rate Source Patient Position   97 % Oral Monitor Lying      BP Location FiO2 (%)     Left arm --       Physical Exam    GENERAL: Well nourished and well developed. Answers questions appropriately.    SKIN: Clean and dry without evidence of rashes.    HEENT: Skull normocephalic, atraumatic. No scleral icterus. PERRLA, with EOMI.  Mucous membranes moist and pink in color.     RESPIRATORY: Clear to ausculation with normal effort. No adventitious sounds, intercostal retractions or shallow breathing.    CARDIOVASCULAR: Regular rate and rhythm with normal S1, S2. No S3, S4, murmurs or gallops. Capillary refill brisk, less than 3 seconds bilaterally. No unilateral lower extremity edema.    ABD/: Right upper quadrant tenderness to palpation.  Bowel sounds equal in all 4 quadrants.  Pain pump present to the left lower quadrant underneath the skin.    MSK: Spontaneously moves all 4 extremities without difficulty.  No injury or obvious deformity.      NEURO/PSYCH: A&Ox3. Cranial nerves II-XII grossly intact. No focal motor or sensory deficits. Appropriate mood and behavior.    ED Course & MDM   Diagnoses as of 04/06/24 2116   Right upper quadrant abdominal pain   Constipation, unspecified constipation type       Medical Decision Making  Parts of  this chart have been completed using voice recognition software. Please excuse any errors of transcription. Despite the medical decision making time stamp above-my medical decision making has taken place during the patient's entire visit. My thought process and reason for plan has been formulated from the time that I saw the patient until the time of disposition and is not specific to one specific moment during their visit and furthermore my MDM encompasses this entire chart and not only this text box.      HPI: Detailed above.    Exam: A medically appropriate exam performed, outlined above, given the known history and presentation.    History obtained from: Patient    Social Determinants of Health considered during this visit: Age, lives at home    Labs Reviewed   CBC WITH AUTO DIFFERENTIAL - Abnormal       Result Value    WBC 9.2      nRBC 0.0      RBC 4.11 (*)     Hemoglobin 12.8 (*)     Hematocrit 38.4 (*)     MCV 93      MCH 31.1      MCHC 33.3      RDW 12.9      Platelets 157      Neutrophils % 77.9      Immature Granulocytes %, Automated 0.7      Lymphocytes % 14.1      Monocytes % 5.9      Eosinophils % 1.0      Basophils % 0.4      Neutrophils Absolute 7.20      Immature Granulocytes Absolute, Automated 0.06      Lymphocytes Absolute 1.30      Monocytes Absolute 0.54      Eosinophils Absolute 0.09      Basophils Absolute 0.04     COMPREHENSIVE METABOLIC PANEL - Abnormal    Glucose 145 (*)     Sodium 135      Potassium 3.5      Chloride 102      Bicarbonate 24      Urea Nitrogen 14      Creatinine 0.80      eGFR >90      Calcium 8.7      Albumin 3.4 (*)     Alkaline Phosphatase 125      Total Protein 6.3      AST 82 (*)     Bilirubin, Total 0.3      ALT 48 (*)     Anion Gap 9     LIPASE - Abnormal    Lipase 154 (*)    URINALYSIS WITH REFLEX CULTURE AND MICROSCOPIC - Abnormal    Color, Urine Light-Yellow      Appearance, Urine Clear      Specific Gravity, Urine 1.040 (*)     pH, Urine 6.0      Protein, Urine  10 (TRACE)      Glucose, Urine Normal      Blood, Urine NEGATIVE      Ketones, Urine NEGATIVE      Bilirubin, Urine NEGATIVE      Urobilinogen, Urine 2 (1+) (*)     Nitrite, Urine NEGATIVE      Leukocyte Esterase, Urine NEGATIVE     URINALYSIS WITH REFLEX CULTURE AND MICROSCOPIC    Narrative:     The following orders were created for panel order Urinalysis with Reflex Culture and Microscopic.  Procedure                               Abnormality         Status                     ---------                               -----------         ------                     Urinalysis with Reflex C...[[id number]]  Abnormal            Final result               Extra Urine Gray Tube[[id number]]                                                         Please view results for these tests on the individual orders.   EXTRA URINE GRAY TUBE   SARS-COV-2 AND INFLUENZA A/B PCR   URINALYSIS MICROSCOPIC WITH REFLEX CULTURE    WBC, Urine 1-5      RBC, Urine 1-2      Mucus, Urine FEW       CT abdomen pelvis w IV contrast   Final Result   1. No acute abnormality within the abdomen or pelvis. No inflammatory   bowel wall thickening or abnormal bowel dilatation is identified.   2. Right inguinal hernia, with protrusion of short-segment of the   large bowel into the hernia defect, without evidence of bowel   obstruction.   3. Changes of penile pump in the cutaneous tissues of the right   anterior abdominal wall, stable in appearance to prior exam in June of 2022.   4. Moderate solid stool burden is present throughout the large bowel.   5. Spinal stimulator device is present in the cutaneous tissues of   the left anterior abdominal wall, with the associated beam hardening   artifact somewhat limiting evaluation of the adjacent structures.   Within limitations of the study no acute abnormality is identified in   the immediate vicinity of the stimulator.   6. Paraseptal and centrilobular emphysematous changes are present in   the lungs  bilaterally with atelectatic/consolidative changes in the   right lung base.             MACRO:   None.        Signed by: Ulysses Curtis 4/6/2024 8:40 PM   Dictation workstation:   [id number]          Medications   sodium chloride 0.9 % bolus 1,000 mL (1,000 mL intravenous New Bag 4/6/24 1925)   ketorolac (Toradol) injection 15 mg (15 mg intravenous Given 4/6/24 2005)   dicyclomine (Bentyl) injection 20 mg (20 mg intramuscular Given 4/6/24 2005)   ondansetron (Zofran) injection 4 mg (4 mg intravenous Given 4/6/24 2005)   iohexol (OMNIPaque) 350 mg iodine/mL solution 75 mL (75 mL intravenous Given 4/6/24 1930)       Differential diagnoses considered for this visit include: Nephrolithiasis versus urinary tract infection versus GERD versus viral illness    Considerations/further MDM:    Patient is a 68-year-old male presenting for abdominal pain.  CBC and CMP are within normal limits.  Urinalysis negative for infection.  Lipase slightly elevated at 154. CT abdomen pelvis addendum 5 no acute abnormality within the abdomen or pelvis.  No inflammatory bowel wall thickening or abnormal bowel dilatation is identified.  Patient refused COVID influenza viral swabbing.  He was given Toradol and Bentyl for pain relief.  Patient was evaluated by my attending physician at this time.  Workup was unremarkable except for large stool burden defined on CT scan.  Patient recommended to take over-the-counter fiber Gummies for relief.  Patient felt comfortable with this plan of care and felt relief of his symptoms after medication administration.  He was released to home in good condition.    Patient was seen in conjunction with attending physician Dr. Kyle Mercado   Patient's history, physical exam, diagnostic studies, and treatment plan were discussed thoroughly.    Procedure  Procedures     Deja Nelson PA-C  04/06/24 [id number]

## 2024-04-06 NOTE — PROGRESS NOTES
Occupational Therapy    OT Treatment    Patient Name: Davi Garsia  MRN: [id number]  Today's Date: 4/6/2024  Time Calculation  Start Time: 1138  Stop Time: 1233  Time Calculation (min): 55 min         Assessment:  OT Assessment: Pt progressing towards established POC  Prognosis: Good  Barriers to Discharge: None  Evaluation/Treatment Tolerance: Patient tolerated treatment well  Medical Staff Made Aware: Yes  End of Session Communication: Bedside nurse (all needs met call light in reach)  End of Session Patient Position: Up in chair, Alarm on  OT Assessment Results: Decreased ADL status, Decreased endurance, Decreased functional mobility, Decreased IADLs, Non-functional left upper extremity  Prognosis: Good  Barriers to Discharge: None  Evaluation/Treatment Tolerance: Patient tolerated treatment well  Medical Staff Made Aware: Yes  Strengths: Ability to acquire knowledge, Attitude of self, Coping skills, Support of extended family/friends, Rehab experience  Barriers to Participation: Comorbidities  Plan:  Treatment Interventions: ADL retraining, Functional transfer training, UE strengthening/ROM, Endurance training, Patient/family training, Equipment evaluation/education, Compensatory technique education  OT Frequency: 4 times per week  OT Discharge Recommendations: Low intensity level of continued care  OT Recommended Transfer Status: Assist of 1  OT - OK to Discharge: Yes  Treatment Interventions: ADL retraining, Functional transfer training, UE strengthening/ROM, Endurance training, Patient/family training, Equipment evaluation/education, Compensatory technique education    Subjective   Previous Visit Info:  OT Last Visit  OT Received On: 04/06/24  General:  General  Reason for Referral: recent surgery  Referred By: Dr Michelle  Past Medical History Relevant to Rehab: r/o Parkinson's per patient; chronic pain; postlami syndrome/DJD lumbar spine; OP; neuropathy; anxiety, osteoporosis, ulcerative colitis,  spinal cord stimulator implant x 2, removed both times for infection  Missed Visit: No  Family/Caregiver Present: No  Prior to Session Communication: Bedside nurse  Patient Position Received: Bed, 3 rail up, Alarm on  Preferred Learning Style: verbal, visual  General Comment: Conferedc with NSG. Pt agreeable toskilled therapeutic intervention  Precautions:  Hearing/Visual Limitations: hearing=WFL; +corrective lenses  UE Weight Bearing Status: Left Non-Weight Bearing  Medical Precautions: Fall precautions  Post-Surgical Precautions: Left shoulder precautions  Precautions Comment: sling at all times except for exercises with therapy (AROM elbow, wrist, hand, digits; passive shoulder forward flexion); external rotataion not to exceed 30 degrees anatomically  Pain:  Pain Assessment  Pain Assessment: 0-10  Pain Score: 0 - No pain    Objective    Cognition:  Cognition  Overall Cognitive Status: Within Functional Limits  Orientation Level: Oriented X4  Activities of Daily Living:      Grooming  Grooming Level of Assistance: Modified independent  Grooming Where Assessed: Chair  Grooming Comments: items in reach Pt brushing teeth combing hair    UE Bathing  UE Bathing Adaptive Equipment: Long-handled sponge  UE Bathing Level of Assistance: Minimum assistance (projected)  UE Bathing Comments: Simulated per Pts wishes instructed with LH sponge L axilla    LE Bathing  LE Bathing Adaptive Equipment: Long-handled sponge, Removeable shower head  LE Bathing Level of Assistance: Minimum assistance (projected)  LE Bathing Comments: Simulated LH sponge B feet /buttocks    UE Dressing  UE Dressing Level of Assistance: Minimum assistance  UE Dressing Where Assessed: Edge of bed  UE Dressing Comments: Pt [facility] carmela don pullover garment  in [place] dressing technique in compliance to TSA to LUE,  Mod A assist don shoulder harness    LE Dressing  LE Dressing: Yes  LE Dressing Adaptive Equipment: Sock aide, Shoe horn  Pants Level of  Assistance: Minimum assistance (Pt don pant/underwear assist partial L wiast band  in stance)  Sock Level of Assistance: Setup (Pt doff/don use sock aid self set up to device)  Shoe Level of Assistance: Setup (laces remain fastened Pt don shoes LH shoe horn to assist)  LE Dressing Where Assessed: Edge of bed  LE Dressing Comments: Pt educated with use AE efficiently in compliance to TSA    Toileting  Toileting Comments: Simulated educateion d/t Pt pleasantly delclining however will utilize urinal for home going  Functional Standing Tolerance:  Time: 2 minutes  Activity: Stance phase ADL skills  Functional Standing Tolerance Comments: unsupported Fair + balance  Bed Mobility/Transfers: Bed Mobility  Bed Mobility: Yes  Bed Mobility 1  Bed Mobility 1: Supine to sitting  Level of Assistance 1: Close supervision  Bed Mobility Comments 1: to Rt EOB, HOB elevated 45' vc body mechanics    Transfers  Transfer: Yes  Transfer 1  Transfer From 1: Bed to  Transfer to 1: Stand  Technique 1: Sit to stand, Stand to sit  Transfer Level of Assistance 1: Close supervision  Trials/Comments 1: to armed chair to right    Tub Transfers  Tub Transfers Comments: Pt having TTB at home and in use currently    Car Transfers  Car Transfer From: Wheelchair  Car Transfer Technique: Stand pivot  Car Transfers: Minimal assistance  Car Transfers Comments: Simulated handout issuance for visual confirmantion task in compliance to TSA precautions, Pt acknowledge with proper teach back    Therapy/Activity: Therapeutic Exercise  Therapeutic Exercise Performed: Yes (LUE therapy (AROM elbow, wrist, hand, digits; passive shoulder forward flexion); external rotataion not to exceed 30 degrees anatomically, Pt instructed with good teach back)    Therapeutic Activity  Therapeutic Activity Performed: Yes  Therapeutic Activity 1: Pt engaged in therapy session 20 minutes    Outcome Measures:[facility] [address] Daily Activity  Putting on and taking off regular lower body  clothing: A little  Bathing (including washing, rinsing, drying): A little  Putting on and taking off regular upper body clothing: A little  Toileting, which includes using toilet, bedpan or urinal: A little  Taking care of personal grooming such as brushing teeth: None  Eating Meals: None  Daily Activity - Total Score: 20      OP EDUCATION:  Education  Individual(s) Educated: Patient  Education Provided: Fall precautons, Risk and benefits of OT discussed with patient or other, POC discussed and agreed upon  Diagnosis and Precautions: (AROM elbow, wrist, hand, digits; passive shoulder forward flexion); external rotataion not to exceed 30 degrees anatomically, NWB to LUE  Risk and Benefits Discussed with Patient/Caregiver/Other: yes  Patient/Caregiver Demonstrated Understanding: yes  Plan of Care Discussed and Agreed Upon: yes  Patient Response to Education: Patient/Caregiver Verbalized Understanding of Information, Patient/Caregiver Performed Return Demonstration of Exercises/Activities, Patient/Caregiver Asked Appropriate Questions    Goals:  Encounter Problems       Encounter Problems (Active)       OT Goals       ADLs (Progressing)       Start:  04/05/24    Expected End:  04/12/24       Pt will complete bathing, dressing, and toileting tasks independently using adaptive aides and with increased time as needed.         Functional transfers (Progressing)       Start:  04/05/24    Expected End:  04/12/24       Pt will complete toilet, bed, and chair transfers independently from elevated seat heights and using bilateral arm supports as needed.         Precautions (Met)       Start:  04/05/24    Expected End:  04/12/24    Resolved:  04/06/24    Pt will verbalize/demonstrate understanding of surgical precautions r/t ADLs and prescribed exercises for left UE.         Activity tolerance (Met)       Start:  04/05/24    Expected End:  04/12/24    Resolved:  04/06/24    Pt will tolerate 20 minutes of therapeutic activity in  order to increase functional endurance needed for IADLs.

## 2024-04-06 NOTE — PROGRESS NOTES
Physical Therapy    Physical Therapy Treatment    Patient Name: Davi Garsia  MRN: [id number]  Today's Date: 4/6/2024  Time Calculation  Start Time: 1310  Stop Time: 1325  Time Calculation (min): 15 min       Assessment/Plan   PT Assessment  PT Assessment Results: Decreased strength, Decreased range of motion, Impaired balance, Decreased mobility, Orthopedic restrictions, Pain, Decreased skin integrity  Rehab Prognosis: Good  Evaluation/Treatment Tolerance: Patient tolerated treatment well  Medical Staff Made Aware: Yes  Strengths: Ability to acquire knowledge, Attitude of self, Premorbid level of function, Rehab experience, Support of Caregivers  Barriers to Participation: Comorbidities  End of Session Communication: Bedside nurse  Assessment Comment: Pt moving well POD #1, able to amb > household distance and negotiate steps to enter home.  End of Session Patient Position: Up in chair, Alarm off, not on at start of session (call light and phone in reach.)  PT Plan  Inpatient/Swing Bed or Outpatient: Inpatient  PT Plan  Treatment/Interventions:  (NA)  PT Plan: Other needs (Comment) (no further acute PT services needed)  PT Eval Only Reason: Only single session needed  PT Frequency: Other (Comment) (single f/u session completed, no furhter acute PT needs)  PT Discharge Recommendations: Low intensity level of continued care  PT Recommended Transfer Status: Contact guard, Stand by assist  PT - OK to Discharge: Yes      General Visit Information:   PT  Visit  PT Received On: 04/06/24  General  Reason for Referral: recent surgery  Referred By: Dr Michelle  Past Medical History Relevant to Rehab: r/o Parkinson's per patient; chronic pain; postlami syndrome/DJD lumbar spine; OP; neuropathy; anxiety, osteoporosis, ulcerative colitis, spinal cord stimulator implant x 2, removed both times for infection  Family/Caregiver Present: No  Co-Treatment: OT  Co-Treatment Reason: partial for safe mobility POD#0  Prior to  Session Communication: Bedside nurse  Patient Position Received: Alarm on, Alarm off, not on at start of session  Preferred Learning Style: verbal, visual  General Comment: Pt agreeable to PT session; getting ready for discharge home soon.    Subjective   Precautions:  Precautions  Hearing/Visual Limitations: hearing=WFL; +corrective lenses  UE Weight Bearing Status: Left Non-Weight Bearing  Medical Precautions: Fall precautions  Post-Surgical Precautions: Left shoulder precautions  Precautions Comment: sling at all times except for exercises with therapy (AROM elbow, wrist, hand, digits; passive shoulder forward flexion); external rotataion not to exceed 30 degrees anatomically    Objective   Pain:  Pain Assessment  Pain Assessment: 0-10  Pain Score: 3 (after medication)  Pain Type: Surgical pain  Pain Location: Shoulder  Pain Orientation: Left  Cognition:  Cognition  Overall Cognitive Status: Within Functional Limits  Orientation Level: Oriented X4  Postural Control:  Postural Control  Posture Comment: forward rounded posture, LUE in sling  Dynamic Standing Balance  Dynamic Standing-Balance Support: No upper extremity supported  Dynamic Standing-Balance:  (amb, turns)  Dynamic Standing-Comments: Fair    Activity Tolerance:  Activity Tolerance  Activity Tolerance Comments: Fair/Fair- post op  Treatments:  Ambulation/Gait Training  Ambulation/Gait Training Performed: Yes  Ambulation/Gait Training 1  Surface 1: Level tile  Device 1: No device  Assistance 1: Close supervision, Contact guard  Comments/Distance (ft) 1: Pt amb ~ 200', slow to fair pace, LUE in sling, mild unsteadiness but no LOB.  Transfers  Transfer: Yes  Transfer 1  Technique 1: Sit to stand, Stand to sit  Transfer Level of Assistance 1: Close supervision  Trials/Comments 1: performed from/to bedside chair w/ arms. Slight effort, using RUE only    Stairs  Stairs: Yes  Stairs  Rails 1: Right  Device 1: No device  Support Devices 1: Gait belt  Assistance  1: Close supervision, Contact guard  Comment/Number of Steps 1: Pt amb up/down 4 steps x 1, rail on Rt side (will use wall at home, spouse assist as needed), steady w/ step to pattern using Rt as stronger LE.    Outcome Measures:  [facility] Basic Mobility  Turning from your back to your side while in a flat bed without using bedrails: A little  Moving from lying on your back to sitting on the side of a flat bed without using bedrails: A little  Moving to and from bed to chair (including a wheelchair): A little  Standing up from a chair using your arms (e.g. wheelchair or bedside chair): A little  To walk in hospital room: A little  Climbing 3-5 steps with railing: A little  Basic Mobility - Total Score: 18    Education Documentation  Precautions, taught by Cassidy Mcgowan, PT at 4/6/2024  1:59 PM.  Learner: Patient  Readiness: Acceptance  Method: Explanation, Demonstration  Response: Verbalizes Understanding, Demonstrated Understanding    Mobility Training, taught by Cassidy Mcgowan, PT at 4/6/2024  1:59 PM.  Learner: Patient  Readiness: Acceptance  Method: Explanation, Demonstration  Response: Verbalizes Understanding, Demonstrated Understanding    Education Comments  No comments found.        OP EDUCATION:       Encounter Problems       Encounter Problems (Active)       PT Transfers       STG - Patient to transfer to and from sit to supine IND, HOB elevated prn (Progressing)       Start:  04/05/24    Expected End:  04/07/24            STG - Patient will transfer sit to and from stand IND (Met)       Start:  04/05/24    Expected End:  04/07/24    Resolved:  04/06/24               Encounter Problems (Resolved)       Balance       LTG - Patient will maintain balance to allow for safe mobility (Met)       Start:  04/05/24    Expected End:  04/07/24    Resolved:  04/06/24            Mobility       STG - Patient will ambulate 100' w/ supervision, no LOB (Met)       Start:  04/05/24    Expected End:  04/07/24    Resolved:   04/06/24         STG - Patient will ambulate up and down a curb/step w/ supervision, no LOB (Met)       Start:  04/05/24    Expected End:  04/07/24    Resolved:  04/06/24

## 2024-04-06 NOTE — PROGRESS NOTES
Davi Garsia is a 68 y.o. male on day 1 of admission presenting with Shoulder arthritis.      Subjective   Patient seen and examined. Resting in bed. Complains of pain 9/10 in the L shoulder. Denies any SOB, chest pain or nausea. Afebrile.        Objective     Last Recorded Vitals  /73 (BP Location: Right arm, Patient Position: Lying)   Pulse 64   Temp 37 °C (98.6 °F) (Temporal)   Resp 18   Wt 60.8 kg (134 lb)   SpO2 97%   Intake/Output last 3 Shifts:    Intake/Output Summary (Last 24 hours) at 4/6/2024 0859  Last data filed at 4/6/2024 0636  Gross per 24 hour   Intake 2229.17 ml   Output 1250 ml   Net 979.17 ml       Admission Weight  Weight: 60.8 kg (134 lb) (04/05/24 1759)    Daily Weight  04/05/24 : 60.8 kg (134 lb)    Image Results  XR shoulder left 2+ views  Narrative: Interpreted By:  Maribel Sifuentes,   STUDY:  Left shoulder, 2 views.      INDICATION:  Signs/Symptoms:Post op total shoulder.      COMPARISON:  None.      ACCESSION NUMBER(S):  [id number]      ORDERING CLINICIAN:  BETTYE TREJO      FINDINGS:  No acute fracture or malalignment. 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 Immediate  postsurgical changes of left reverse total shoulder arthroplasty.  Hardware is intact without perihardware fractures or lucencies.  Expected postsurgical soft tissue gas within the surrounding soft  tissues.      Impression: 1. Immediate postsurgical changes of left reverse shoulder  arthroplasty without hardware complication.      MACRO:      None.      Signed by: Maribel Sifuentes 4/5/2024 2:32 PM  Dictation workstation:   [id number]      Physical Exam    General: Alert and oriented x3, pleasant.   Cardiac: Regular rate and rhythm, S1/S2 , no murmur.   Pulmonary: Clear to auscultation on room air.   Abdomen: Soft, round, nontender. BS +x4.   Extremities: No edema. LUE sling in place.   Skin: No rashes or lesions. Silver dressing to the L shoulder without  drainage.     Relevant Results    Scheduled medications  acetaminophen, 650 mg, oral, q6h CORY  amantadine, 100 mg, oral, Daily  amitriptyline, 10 mg, oral, Nightly  atorvastatin, 20 mg, oral, Nightly  escitalopram, 5 mg, oral, Daily  gabapentin, 800 mg, oral, 4x daily  topiramate, 100 mg, oral, BID      Continuous medications  lactated Ringer's, 50 mL/hr, Last Rate: 50 mL/hr (04/06/24 0636)      PRN medications  PRN medications: chlorhexidine, HYDROmorphone, naloxone, naloxone, ondansetron ODT **OR** ondansetron, oxyCODONE-acetaminophen, traZODone     Results for orders placed or performed during the hospital encounter of 04/05/24 (from the past 24 hour(s))   CBC   Result Value Ref Range    WBC 8.8 4.4 - 11.3 x10*3/uL    nRBC 0.0 0.0 - 0.0 /100 WBCs    RBC 4.13 (L) 4.50 - 5.90 x10*6/uL    Hemoglobin 12.8 (L) 13.5 - 17.5 g/dL    Hematocrit 38.9 (L) 41.0 - 52.0 %    MCV 94 80 - 100 fL    MCH 31.0 26.0 - 34.0 pg    MCHC 32.9 32.0 - 36.0 g/dL    RDW 12.8 11.5 - 14.5 %    Platelets 160 150 - 450 x10*3/uL   Basic Metabolic Panel   Result Value Ref Range    Glucose 122 (H) 65 - 99 mg/dL    Sodium 138 133 - 145 mmol/L    Potassium 4.3 3.4 - 5.1 mmol/L    Chloride 108 (H) 97 - 107 mmol/L    Bicarbonate 21 (L) 24 - 31 mmol/L    Urea Nitrogen 14 8 - 25 mg/dL    Creatinine 0.70 0.40 - 1.60 mg/dL    eGFR >90 >60 mL/min/1.73m*2    Calcium 8.7 8.5 - 10.4 mg/dL    Anion Gap 9 <=19 mmol/L           Assessment/Plan      Arthroplasty  left reverse shoulder. Surgery   POD #1.  Pain control per surgeon.   Post op care per surgeon.   PT/OT, OOB as able. Plan for HHC.      BPH   Not on any medication at home.   Monitor urine output, voiding without issue.      GERD  Antiemetic as needed. No complaints.      HLD   Continue statin.     Depression  Continue with home meds.    Fibromyalgia / Chronic Pain  On high dose gabapentin, amitriptyline.   Has morphine pump for back pain.   Monitor carefully with extra pain meds post op.     Tobacco  use  Smoking cessation.     DVT  prophylaxis  Pharm agent per surgeon.  SCDs.     Plan   Has some pain this AM, nurse medicating. No other complaints.   OOB and ambulated.   Continue post op care.   PT/OT, plan for HHC.   Medically stable.         JUSTEN Mcduffie-CNP

## 2024-04-06 NOTE — ED NOTES
Report received. Pt awake in bed. Waiting on MD to see. No needs at this time      Catherine Agustin RN  04/06/24 7673

## 2024-04-06 NOTE — HH CARE COORDINATION
Home Care received a Referral for Physical Therapy and Home Health Aide. We have processed the referral for a Start of Care on 4/7/24.     If you have any questions or concerns, please feel free to contact us at [phone]. Follow the prompts, enter your five digit zip code, and you will be directed to your care team on E[facility] 1.

## 2024-04-06 NOTE — PROGRESS NOTES
04/06/24 0945   Discharge Planning   Patient expects to be discharged to: [facility] SOC 4/7     SOC 4/7 and insurance verified     **Patient has a  safe discharge plan**

## 2024-04-06 NOTE — PROGRESS NOTES
Spiritual Care Visit    Clinical Encounter Type  Visited With: Patient  Routine Visit: Introduction  Continue Visiting: Yes         Values/Beliefs  Spiritual Requests During Hospitalization: Anointing & Communion today    Sacramental Encounters  Communion: Patient wants communion  Communion Given Indicator: Yes  Sacrament of Sick-Anointing: Anointed     Momo Thurman

## 2024-04-06 NOTE — CARE PLAN
The patient's goals for the shift include pain control, comfort    The clinical goals for the shift include pain control

## 2024-04-07 ENCOUNTER — HOME CARE VISIT (OUTPATIENT)
Dept: HOME HEALTH SERVICES | Facility: HOME HEALTH | Age: 69
End: 2024-04-07
Payer: MEDICARE

## 2024-04-07 VITALS
TEMPERATURE: 99.2 F | DIASTOLIC BLOOD PRESSURE: 54 MMHG | OXYGEN SATURATION: 96 % | HEART RATE: 72 BPM | SYSTOLIC BLOOD PRESSURE: 94 MMHG | RESPIRATION RATE: 18 BRPM

## 2024-04-07 PROCEDURE — 0023 HH SOC

## 2024-04-07 PROCEDURE — 1090000001 HH PPS REVENUE CREDIT

## 2024-04-07 PROCEDURE — G0151 HHCP-SERV OF PT,EA 15 MIN: HCPCS | Mod: HHH

## 2024-04-07 PROCEDURE — 169592 NO-PAY CLAIM PROCEDURE

## 2024-04-07 PROCEDURE — 1090000002 HH PPS REVENUE DEBIT

## 2024-04-07 SDOH — HEALTH STABILITY: PHYSICAL HEALTH: PHYSICAL EXERCISE: 10

## 2024-04-07 SDOH — HEALTH STABILITY: PHYSICAL HEALTH: EXERCISE TYPE: WRITTEN

## 2024-04-07 ASSESSMENT — ENCOUNTER SYMPTOMS
PAIN LOCATION - EXACERBATING FACTORS: MVMT
PAIN LOCATION - PAIN DURATION: VARIES
PAIN LOCATION - PAIN FREQUENCY: INTERMITTENT
PERSON REPORTING PAIN: PATIENT
PAIN LOCATION - PAIN QUALITY: ACHING
SHORTNESS OF BREATH: T
PAIN LOCATION - RELIEVING FACTORS: REST
PAIN SEVERITY GOAL: 2/10
ARTHRALGIAS: 1
SUBJECTIVE PAIN PROGRESSION: GRADUALLY IMPROVING
HIGHEST PAIN SEVERITY IN PAST 24 HOURS: 10/10
MUSCLE WEAKNESS: 1
PAIN LOCATION: LEFT SHOULDER
PAIN: 1
LOWEST PAIN SEVERITY IN PAST 24 HOURS: 4/10
PAIN LOCATION - PAIN SEVERITY: 6/10

## 2024-04-07 ASSESSMENT — ACTIVITIES OF DAILY LIVING (ADL)
OASIS_M1830: 06
AMBULATION_DISTANCE/DURATION_TOLERATED: 50'
CURRENT_FUNCTION: STAND BY ASSIST
ENTERING_EXITING_HOME: MINIMUM ASSIST
AMBULATION ASSISTANCE: 1
AMBULATION ASSISTANCE ON FLAT SURFACES: 1
AMBULATION ASSISTANCE: STAND BY ASSIST
PHYSICAL TRANSFERS ASSESSED: 1

## 2024-04-07 ASSESSMENT — GAIT ASSESSMENTS
STEP CONTINUITY: 0 - STOPPING OR DISCONTINUITY BETWEEN STEPS
TRUNK: 1 - NO SWAY BUT FLEXION OF KNEES OR BACK OR SPREADS ARMS WHILE WALKING
PATH SCORE: 1
TRUNK SCORE: 1
WALKING STANCE: 0 - HEELS APART
STEP SYMMETRY: 0 - RIGHT AND LEFT STEP LENGTH NOT EQUAL
INITIATION OF GAIT IMMEDIATELY AFTER GO: 0 - ANY HESITANCY OR MULTIPLE ATTEMPTS TO START
PATH: 1 - MILD/MODERATE DEVIATION OR USES WALKING AID
BALANCE AND GAIT SCORE: 16
GAIT SCORE: 6

## 2024-04-07 ASSESSMENT — BALANCE ASSESSMENTS
STANDING BALANCE: 1 - STEADY BUT WIDE STANCE AND USES CANE OR OTHER SUPPORT
SITTING BALANCE: 1 - STEADY, SAFE
NUDGED SCORE: 1
ARISES: 1 - ABLE, USES ARMS TO HELP
TURNING 360 DEGREES STEPS: 1 - CONTINUOUS STEPS
ARISING SCORE: 1
ATTEMPTS TO ARISE: 1 - ABLE, REQUIRES MORE THAN ONE ATTEMPT
BALANCE SCORE: 10
SITTING DOWN: 1 - USES ARMS OR NOT SMOOTH MOTION
IMMEDIATE STANDING BALANCE FIRST 5 SECONDS: 1 - STEADY BUT USES WALKER OR OTHER SUPPORT
EYES CLOSED AT MAXIMUM POSITION NUDGED: 1 - STEADY
NUDGED: 1 - STAGGERS, GRABS, CATCHES SELF

## 2024-04-07 NOTE — DISCHARGE INSTRUCTIONS
Thank you for choosing [facility] and [facility]  for your emergency care.    Please return to the Emergency Department immediately if new or worsening symptoms occur. Symptoms of that are most concerning include worsening pain, chest pain, fevers, chills or shortness of breath.    It is important to remember that your care does not end here and you must continue to monitor your condition closely. Please return to the emergency department for any worsening or concerning signs or symptoms as directed by our conversations and the discharge instructions. Otherwise please follow up with your doctor in 2 days if no better or worse. If you do not have a doctor please contact the referral number on your discharge instructions. Please contact any physician specialists provided in your discharge notes as it is very important to follow up with them regarding your condition. If you are unable to reach the physicians provided, please come back to the Emergency Department at any time.      As always, please take medications as directed. If you have any questions at all regarding your medications, please contact the pharmacist, the emergency department, or your doctor. Before taking any medication prescribed in the Emergency Department, please review the medication side effects and drug interactions as they may interact with your home medications.     Education materials have been provided to you about your encounter today.  Please review the attachments at your earliest convenience.

## 2024-04-07 NOTE — DISCHARGE SUMMARY
Discharge Diagnosis  Shoulder arthritis    Issues Requiring Follow-Up  Shoulder replacement    Test Results Pending At Discharge  Pending Labs       No current pending labs.            Hospital Course   Surgery, pain control, antibiotics, PT    Pertinent Physical Exam At Time of Discharge  Physical Exam    Home Medications     Medication List      START taking these medications     HYDROmorphone 2 mg tablet; Commonly known as: Dilaudid; Take 1 tablet (2   mg) by mouth every 4 hours if needed for severe pain (7 - 10).   * oxyCODONE-acetaminophen 5-325 mg tablet; Commonly known as: Percocet;   Take 1 tablet by mouth every 6 hours if needed for severe pain (7 - 10)   for up to 6 days.   * oxyCODONE-acetaminophen 5-325 mg tablet; Commonly known as: Percocet;   Take 1 tablet by mouth every 4 hours if needed for moderate pain (4 - 6).  * This list has 2 medication(s) that are the same as other medications   prescribed for you. Read the directions carefully, and ask your doctor or   other care provider to review them with you.     CHANGE how you take these medications     * amantadine 100 mg tablet; Commonly known as: Symmetrel; One tablet   twice a day; What changed: Another medication with the same name was   added. Make sure you understand how and when to take each.   * amantadine 100 mg capsule; Commonly known as: Symmetrel; Take 1   capsule (100 mg) by mouth once daily. Do not start before April 7, 2024.;   What changed: You were already taking a medication with the same name, and   this prescription was added. Make sure you understand how and when to take   each.   * amitriptyline 10 mg tablet; Commonly known as: Elavil; Take 1 tablet   (10 mg) by mouth once daily at bedtime.; What changed: Another medication   with the same name was added. Make sure you understand how and when to   take each.   * amitriptyline 10 mg tablet; Commonly known as: Elavil; Take 1 tablet   (10 mg) by mouth once daily at bedtime.; What  changed: You were already   taking a medication with the same name, and this prescription was added.   Make sure you understand how and when to take each.   * atorvastatin 20 mg tablet; Commonly known as: Lipitor; Take 1 tablet   (20 mg) by mouth once daily.; What changed: how much to take   * atorvastatin 20 mg tablet; Commonly known as: Lipitor; Take 1 tablet   (20 mg) by mouth once daily at bedtime.; What changed: You were already   taking a medication with the same name, and this prescription was added.   Make sure you understand how and when to take each.   * escitalopram 5 mg tablet; Commonly known as: Lexapro; Take 1 tablet (5   mg) by mouth once daily.; What changed: when to take this   * escitalopram 5 mg tablet; Commonly known as: Lexapro; Take 1 tablet (5   mg) by mouth once daily. Do not start before April 7, 2024.; What changed:   You were already taking a medication with the same name, and this   prescription was added. Make sure you understand how and when to take   each.   * gabapentin 400 mg capsule; Commonly known as: Neurontin; Take 2   capsules (800 mg) by mouth 4 times a day.; What changed: Another   medication with the same name was added. Make sure you understand how and   when to take each.   * gabapentin 800 mg tablet; Commonly known as: Neurontin; Take 1 tablet   (800 mg) by mouth 3 times a day.; What changed: You were already taking a   medication with the same name, and this prescription was added. Make sure   you understand how and when to take each.   * topiramate 100 mg tablet; Commonly known as: Topamax; Take 1 tablet   (100 mg) by mouth 2 times a day.; What changed: Another medication with   the same name was added. Make sure you understand how and when to take   each.   * topiramate 100 mg tablet; Commonly known as: Topamax; Take 1 tablet   (100 mg) by mouth 2 times a day.; What changed: You were already taking a   medication with the same name, and this prescription was added. Make  sure   you understand how and when to take each.  * This list has 12 medication(s) that are the same as other medications   prescribed for you. Read the directions carefully, and ask your doctor or   other care provider to review them with you.     CONTINUE taking these medications     ibuprofen 200 mg tablet   ketamine 4 mg/mL swish & spit solution; Commonly known as: Ketalar   Pump Patient Supplied Medication    traZODone 150 mg tablet; Commonly known as: Desyrel; TAKE 1 TABLET AS   NEEDED AT BEDTIME FOR SLEEP     ASK your doctor about these medications     chlorhexidine 0.12 % solution; Commonly known as: Peridex; Use 15 mL in   the mouth or throat if needed for wound care for up to 2 doses. Use cap to   measure 15 mL.  Swish/gargle mouthwash for at least 30 seconds.  Do not   swallow.  Use night before surgery after brushing teeth and morning of   surgery after brushing teeth. Do not start before March 28, 2024.; Ask   about: Should I take this medication?       Outpatient Follow-Up  Future Appointments   Date Time Provider Department Center   4/10/2024 To Be Determined Jaci Walsh, PT [facility]   4/15/2024 To Be Determined Jaci Walsh, PT [facility]   4/18/2024 To Be Determined Jaci Walsh, PT [facility]   4/22/2024 To Be Determined Jaci Walsh, PT [facility]   4/24/2024 10:00 AM Alcira Wooten MD [id number] [facility]   4/25/2024 To Be Determined Jaci Walsh, PT [facility]   4/29/2024 To Be Determined Jaci Walsh, [facility]   5/2/2024 To Be Determined Jaci Walsh, PT [facility]       Terrell Michelle MD

## 2024-04-07 NOTE — HOME HEALTH
"S: \"I want to get stronger\"  O: Pt went sit to stand from recliner without AD and SBA. Pt ambulated 50' without AD on level ground with SBA/CGA. Pt has 1 step up into kitchen which he did without AD and CGA. Pt did supine(heel slides, hip abduct), seated(LAQ, abdom, right UE, left elbow/forearm/wrist/hand), standing(squats, toe raises) x 10 reps. Pt received passive left shoulder forward flexion per MD orders, no rotation.  A: Pt had left reverse TSR per Dr. Michelle on 4-5-24. Was d/c'd home on 4-6 but returned to the ER that evening with right upper quadrant pain, dx'd as bowel obstruction. 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 Pt has had long h/o of back surgeries, infections, pain pump. left TKR in the past. Waterproof dressing intact, no drainage noted, to be removed by surgeon in 10 days. Allowed to shower after 7 days. Sling intact, pt sleeping in recliner.  P: PT 2wk4 to address ther ex, ROM left shoulder per MD orders, gait, balance and stairs. HHA 2wk4 for shower safety"

## 2024-04-07 NOTE — ED PROVIDER NOTES
Supervisory note:  Patient seen in conjunction with CHARLES Cramer.  Patient presents with abdominal pain that started just prior to arrival to the emergency department.  He reports that the pain was initially severe.  He did just have surgery for his shoulder.  He reports that he had been having bowel movements only once weekly prior to the surgery.  He did have a bowel movement yesterday however.  On examination, there is right upper quadrant tenderness to palpation without guarding.  Laboratory studies are unremarkable.  CAT scan without acute findings but does reveal moderate stool burden.  Patient was advised to begin using fiber supplements and use MiraLAX to achieve 1 soft bowel movement daily.  My suspicion for bowel obstruction, bowel ischemia, AAA, aortic dissection, appendicitis, cholangitis/choledocholithiasis as etiology of symptoms is very low.  Patient advised to follow-up with primary care physician and was given return precautions for any worsening symptoms I personally saw the patient and made/approved the management plan and take responsiblity for the patient management.  Parts of this chart were completed with dictation software, please excuse any errors in transcription..     Kyle Mercado MD  04/06/24 9106

## 2024-04-08 PROCEDURE — 1090000002 HH PPS REVENUE DEBIT

## 2024-04-08 PROCEDURE — 1090000001 HH PPS REVENUE CREDIT

## 2024-04-09 ENCOUNTER — HOME CARE VISIT (OUTPATIENT)
Dept: HOME HEALTH SERVICES | Facility: HOME HEALTH | Age: 69
End: 2024-04-09
Payer: MEDICARE

## 2024-04-09 PROCEDURE — G0156 HHCP-SVS OF AIDE,EA 15 MIN: HCPCS | Mod: HHH

## 2024-04-09 PROCEDURE — 1090000001 HH PPS REVENUE CREDIT

## 2024-04-09 PROCEDURE — 1090000002 HH PPS REVENUE DEBIT

## 2024-04-10 ENCOUNTER — HOME CARE VISIT (OUTPATIENT)
Dept: HOME HEALTH SERVICES | Facility: HOME HEALTH | Age: 69
End: 2024-04-10
Payer: MEDICARE

## 2024-04-10 VITALS — RESPIRATION RATE: 18 BRPM | DIASTOLIC BLOOD PRESSURE: 68 MMHG | HEART RATE: 74 BPM | SYSTOLIC BLOOD PRESSURE: 110 MMHG

## 2024-04-10 PROCEDURE — G0157 HHC PT ASSISTANT EA 15: HCPCS | Mod: CQ,HHH

## 2024-04-10 PROCEDURE — 1090000002 HH PPS REVENUE DEBIT

## 2024-04-10 PROCEDURE — 1090000001 HH PPS REVENUE CREDIT

## 2024-04-10 ASSESSMENT — ENCOUNTER SYMPTOMS
PERSON REPORTING PAIN: PATIENT
PAIN: 1
HIGHEST PAIN SEVERITY IN PAST 24 HOURS: 10/10
LOWEST PAIN SEVERITY IN PAST 24 HOURS: 4/10

## 2024-04-11 PROCEDURE — 1090000002 HH PPS REVENUE DEBIT

## 2024-04-11 PROCEDURE — 1090000001 HH PPS REVENUE CREDIT

## 2024-04-12 ENCOUNTER — HOME CARE VISIT (OUTPATIENT)
Dept: HOME HEALTH SERVICES | Facility: HOME HEALTH | Age: 69
End: 2024-04-12
Payer: MEDICARE

## 2024-04-12 PROCEDURE — 1090000001 HH PPS REVENUE CREDIT

## 2024-04-12 PROCEDURE — G0156 HHCP-SVS OF AIDE,EA 15 MIN: HCPCS | Mod: HHH

## 2024-04-12 PROCEDURE — 1090000002 HH PPS REVENUE DEBIT

## 2024-04-13 PROCEDURE — 1090000002 HH PPS REVENUE DEBIT

## 2024-04-13 PROCEDURE — 1090000001 HH PPS REVENUE CREDIT

## 2024-04-14 PROCEDURE — 1090000002 HH PPS REVENUE DEBIT

## 2024-04-14 PROCEDURE — 1090000001 HH PPS REVENUE CREDIT

## 2024-04-15 DIAGNOSIS — G45.9 TIA (TRANSIENT ISCHEMIC ATTACK): Primary | ICD-10-CM

## 2024-04-15 PROCEDURE — 1090000001 HH PPS REVENUE CREDIT

## 2024-04-15 PROCEDURE — 1090000002 HH PPS REVENUE DEBIT

## 2024-04-16 ENCOUNTER — HOME CARE VISIT (OUTPATIENT)
Dept: HOME HEALTH SERVICES | Facility: HOME HEALTH | Age: 69
End: 2024-04-16
Payer: MEDICARE

## 2024-04-16 VITALS — DIASTOLIC BLOOD PRESSURE: 80 MMHG | SYSTOLIC BLOOD PRESSURE: 132 MMHG | RESPIRATION RATE: 18 BRPM

## 2024-04-16 PROCEDURE — 1090000002 HH PPS REVENUE DEBIT

## 2024-04-16 PROCEDURE — G0156 HHCP-SVS OF AIDE,EA 15 MIN: HCPCS | Mod: HHH

## 2024-04-16 PROCEDURE — G0157 HHC PT ASSISTANT EA 15: HCPCS | Mod: CQ,HHH

## 2024-04-16 PROCEDURE — 1090000001 HH PPS REVENUE CREDIT

## 2024-04-16 ASSESSMENT — ENCOUNTER SYMPTOMS
PAIN: 1
PERSON REPORTING PAIN: PATIENT
HIGHEST PAIN SEVERITY IN PAST 24 HOURS: 4/10
LOWEST PAIN SEVERITY IN PAST 24 HOURS: 3/10

## 2024-04-17 PROCEDURE — 1090000002 HH PPS REVENUE DEBIT

## 2024-04-17 PROCEDURE — 1090000001 HH PPS REVENUE CREDIT

## 2024-04-18 ENCOUNTER — HOME CARE VISIT (OUTPATIENT)
Dept: HOME HEALTH SERVICES | Facility: HOME HEALTH | Age: 69
End: 2024-04-18
Payer: MEDICARE

## 2024-04-18 ENCOUNTER — TELEPHONE (OUTPATIENT)
Dept: NEUROLOGY | Facility: CLINIC | Age: 69
End: 2024-04-18
Payer: MEDICARE

## 2024-04-18 PROCEDURE — 1090000002 HH PPS REVENUE DEBIT

## 2024-04-18 PROCEDURE — 1090000001 HH PPS REVENUE CREDIT

## 2024-04-18 PROCEDURE — G0157 HHC PT ASSISTANT EA 15: HCPCS | Mod: CQ,HHH

## 2024-04-18 NOTE — TELEPHONE ENCOUNTER
In a sling and had shoulder surgery, and now that arm is moving when he did not have it before.     Denies SE from amantadine, no dizziness, edema or hallucinations.     Doing well besides the chorea, does not appear to be ill or anything.     His cognition is worse 7-10 days. Had PCP apt last week but did not discuss worsening mentation or chorea.   He is taking pain meds--wife is now monitoring when he takes it instead of self administering and today he has only had 1 pill    Ingrezza- too expensive   TBZ-too expensive  Austedo-insurance would not cover      PLAN  If suddenly the chorea is worse and mentation is worse needs to see PCP or urgent care for infection/workup--she is going to let PCP and ortho know    Any sudden worsening he needs to go ER or urgent care    Continue amantadine 100mg 2 times a day--no increases for now

## 2024-04-18 NOTE — TELEPHONE ENCOUNTER
Wife called and stated movement has increased and amantadine is not working currently taking 1 tablet BID / Patient was seen in [facility] 3/12/24

## 2024-04-19 ENCOUNTER — HOME CARE VISIT (OUTPATIENT)
Dept: HOME HEALTH SERVICES | Facility: HOME HEALTH | Age: 69
End: 2024-04-19
Payer: MEDICARE

## 2024-04-19 VITALS
HEART RATE: 62 BPM | RESPIRATION RATE: 18 BRPM | TEMPERATURE: 97.6 F | SYSTOLIC BLOOD PRESSURE: 112 MMHG | DIASTOLIC BLOOD PRESSURE: 62 MMHG

## 2024-04-19 PROCEDURE — 1090000002 HH PPS REVENUE DEBIT

## 2024-04-19 PROCEDURE — G0156 HHCP-SVS OF AIDE,EA 15 MIN: HCPCS | Mod: HHH

## 2024-04-19 PROCEDURE — 1090000001 HH PPS REVENUE CREDIT

## 2024-04-19 ASSESSMENT — ENCOUNTER SYMPTOMS
PERSON REPORTING PAIN: PATIENT
PAIN: 1
LOWEST PAIN SEVERITY IN PAST 24 HOURS: 3/10
HIGHEST PAIN SEVERITY IN PAST 24 HOURS: 4/10

## 2024-04-20 PROCEDURE — 1090000001 HH PPS REVENUE CREDIT

## 2024-04-20 PROCEDURE — 1090000002 HH PPS REVENUE DEBIT

## 2024-04-21 PROCEDURE — 1090000001 HH PPS REVENUE CREDIT

## 2024-04-21 PROCEDURE — 1090000002 HH PPS REVENUE DEBIT

## 2024-04-22 ENCOUNTER — LAB (OUTPATIENT)
Dept: LAB | Facility: LAB | Age: 69
End: 2024-04-22
Payer: MEDICARE

## 2024-04-22 DIAGNOSIS — Z01.89 ENCOUNTER FOR ROUTINE LABORATORY TESTING: ICD-10-CM

## 2024-04-22 DIAGNOSIS — E78.2 MIXED HYPERLIPIDEMIA: ICD-10-CM

## 2024-04-22 DIAGNOSIS — Z12.5 ENCOUNTER FOR PROSTATE CANCER SCREENING: ICD-10-CM

## 2024-04-22 DIAGNOSIS — R53.83 OTHER FATIGUE: ICD-10-CM

## 2024-04-22 LAB
ALBUMIN SERPL-MCNC: 3.9 G/DL (ref 3.5–5)
ALP BLD-CCNC: 184 U/L (ref 35–125)
ALT SERPL-CCNC: 24 U/L (ref 5–40)
ANION GAP SERPL CALC-SCNC: 6 MMOL/L
AST SERPL-CCNC: 16 U/L (ref 5–40)
BILIRUB SERPL-MCNC: 0.3 MG/DL (ref 0.1–1.2)
BUN SERPL-MCNC: 9 MG/DL (ref 8–25)
CALCIUM SERPL-MCNC: 9.3 MG/DL (ref 8.5–10.4)
CHLORIDE SERPL-SCNC: 106 MMOL/L (ref 97–107)
CHOLEST SERPL-MCNC: 114 MG/DL (ref 133–200)
CHOLEST/HDLC SERPL: 3 {RATIO}
CO2 SERPL-SCNC: 27 MMOL/L (ref 24–31)
CREAT SERPL-MCNC: 0.9 MG/DL (ref 0.4–1.6)
EGFRCR SERPLBLD CKD-EPI 2021: >90 ML/MIN/1.73M*2
ERYTHROCYTE [DISTWIDTH] IN BLOOD BY AUTOMATED COUNT: 13.1 % (ref 11.5–14.5)
GLUCOSE SERPL-MCNC: 100 MG/DL (ref 65–99)
HCT VFR BLD AUTO: 39.3 % (ref 41–52)
HDLC SERPL-MCNC: 38 MG/DL
HGB BLD-MCNC: 12.3 G/DL (ref 13.5–17.5)
LDLC SERPL CALC-MCNC: 62 MG/DL (ref 65–130)
MCH RBC QN AUTO: 30.4 PG (ref 26–34)
MCHC RBC AUTO-ENTMCNC: 31.3 G/DL (ref 32–36)
MCV RBC AUTO: 97 FL (ref 80–100)
NRBC BLD-RTO: 0 /100 WBCS (ref 0–0)
PLATELET # BLD AUTO: 352 X10*3/UL (ref 150–450)
POTASSIUM SERPL-SCNC: 4.4 MMOL/L (ref 3.4–5.1)
PROT SERPL-MCNC: 6.4 G/DL (ref 5.9–7.9)
PSA SERPL-MCNC: 0.3 NG/ML
RBC # BLD AUTO: 4.05 X10*6/UL (ref 4.5–5.9)
SODIUM SERPL-SCNC: 139 MMOL/L (ref 133–145)
TRIGL SERPL-MCNC: 70 MG/DL (ref 40–150)
TSH SERPL DL<=0.05 MIU/L-ACNC: 1.11 MIU/L (ref 0.27–4.2)
WBC # BLD AUTO: 6.1 X10*3/UL (ref 4.4–11.3)

## 2024-04-22 PROCEDURE — 36415 COLL VENOUS BLD VENIPUNCTURE: CPT

## 2024-04-22 PROCEDURE — 1090000002 HH PPS REVENUE DEBIT

## 2024-04-22 PROCEDURE — 80061 LIPID PANEL: CPT

## 2024-04-22 PROCEDURE — 84443 ASSAY THYROID STIM HORMONE: CPT

## 2024-04-22 PROCEDURE — 80053 COMPREHEN METABOLIC PANEL: CPT

## 2024-04-22 PROCEDURE — 1090000001 HH PPS REVENUE CREDIT

## 2024-04-22 PROCEDURE — 85027 COMPLETE CBC AUTOMATED: CPT

## 2024-04-22 PROCEDURE — G0103 PSA SCREENING: HCPCS

## 2024-04-23 ENCOUNTER — HOME CARE VISIT (OUTPATIENT)
Dept: HOME HEALTH SERVICES | Facility: HOME HEALTH | Age: 69
End: 2024-04-23
Payer: MEDICARE

## 2024-04-23 PROBLEM — K59.00 CONSTIPATION: Status: ACTIVE | Noted: 2024-04-02

## 2024-04-23 PROCEDURE — 1090000001 HH PPS REVENUE CREDIT

## 2024-04-23 PROCEDURE — 1090000002 HH PPS REVENUE DEBIT

## 2024-04-23 PROCEDURE — G0156 HHCP-SVS OF AIDE,EA 15 MIN: HCPCS | Mod: HHH

## 2024-04-23 RX ORDER — ONDANSETRON 4 MG/1
4 TABLET, ORALLY DISINTEGRATING ORAL EVERY 8 HOURS PRN
COMMUNITY
Start: 2024-04-05 | End: 2024-04-24 | Stop reason: WASHOUT

## 2024-04-24 ENCOUNTER — HOME CARE VISIT (OUTPATIENT)
Dept: HOME HEALTH SERVICES | Facility: HOME HEALTH | Age: 69
End: 2024-04-24
Payer: MEDICARE

## 2024-04-24 ENCOUNTER — OFFICE VISIT (OUTPATIENT)
Dept: PRIMARY CARE | Facility: CLINIC | Age: 69
End: 2024-04-24
Payer: MEDICARE

## 2024-04-24 VITALS
BODY MASS INDEX: 21.07 KG/M2 | WEIGHT: 139 LBS | SYSTOLIC BLOOD PRESSURE: 113 MMHG | HEIGHT: 68 IN | DIASTOLIC BLOOD PRESSURE: 69 MMHG | TEMPERATURE: 97.5 F | HEART RATE: 80 BPM | OXYGEN SATURATION: 99 %

## 2024-04-24 DIAGNOSIS — G62.9 NEUROPATHY: ICD-10-CM

## 2024-04-24 DIAGNOSIS — G47.9 SLEEP DISTURBANCES: ICD-10-CM

## 2024-04-24 DIAGNOSIS — M25.551 PAIN OF RIGHT HIP: ICD-10-CM

## 2024-04-24 DIAGNOSIS — M79.18 GLUTEAL PAIN: ICD-10-CM

## 2024-04-24 DIAGNOSIS — E78.2 MIXED HYPERLIPIDEMIA: ICD-10-CM

## 2024-04-24 DIAGNOSIS — M96.1 POST LAMINECTOMY SYNDROME: ICD-10-CM

## 2024-04-24 DIAGNOSIS — K59.03 DRUG-INDUCED CONSTIPATION: Primary | ICD-10-CM

## 2024-04-24 DIAGNOSIS — M19.019 SHOULDER ARTHRITIS: ICD-10-CM

## 2024-04-24 PROCEDURE — 1111F DSCHRG MED/CURRENT MED MERGE: CPT | Performed by: INTERNAL MEDICINE

## 2024-04-24 PROCEDURE — 99214 OFFICE O/P EST MOD 30 MIN: CPT | Performed by: INTERNAL MEDICINE

## 2024-04-24 PROCEDURE — G0157 HHC PT ASSISTANT EA 15: HCPCS | Mod: CQ,HHH

## 2024-04-24 PROCEDURE — 1159F MED LIST DOCD IN RCRD: CPT | Performed by: INTERNAL MEDICINE

## 2024-04-24 PROCEDURE — 1125F AMNT PAIN NOTED PAIN PRSNT: CPT | Performed by: INTERNAL MEDICINE

## 2024-04-24 PROCEDURE — G2211 COMPLEX E/M VISIT ADD ON: HCPCS | Performed by: INTERNAL MEDICINE

## 2024-04-24 PROCEDURE — 1090000001 HH PPS REVENUE CREDIT

## 2024-04-24 PROCEDURE — 1160F RVW MEDS BY RX/DR IN RCRD: CPT | Performed by: INTERNAL MEDICINE

## 2024-04-24 PROCEDURE — 1090000002 HH PPS REVENUE DEBIT

## 2024-04-24 RX ORDER — TRAZODONE HYDROCHLORIDE 150 MG/1
150 TABLET ORAL NIGHTLY PRN
Qty: 100 TABLET | Refills: 1 | Status: SHIPPED | OUTPATIENT
Start: 2024-04-24

## 2024-04-24 RX ORDER — GABAPENTIN 400 MG/1
800 CAPSULE ORAL 4 TIMES DAILY
Qty: 400 CAPSULE | Refills: 1 | Status: SHIPPED | OUTPATIENT
Start: 2024-04-24

## 2024-04-24 RX ORDER — AMITRIPTYLINE HYDROCHLORIDE 10 MG/1
10 TABLET, FILM COATED ORAL NIGHTLY
Qty: 100 TABLET | Refills: 1 | Status: SHIPPED | OUTPATIENT
Start: 2024-04-24 | End: 2024-04-24

## 2024-04-24 RX ORDER — ATORVASTATIN CALCIUM 20 MG/1
20 TABLET, FILM COATED ORAL DAILY
Qty: 100 TABLET | Refills: 1 | Status: SHIPPED | OUTPATIENT
Start: 2024-04-24 | End: 2024-05-14 | Stop reason: SDUPTHER

## 2024-04-24 RX ORDER — ESCITALOPRAM OXALATE 5 MG/1
5 TABLET ORAL DAILY
Qty: 100 TABLET | Refills: 1 | Status: SHIPPED | OUTPATIENT
Start: 2024-04-24

## 2024-04-24 RX ORDER — TRAZODONE HYDROCHLORIDE 150 MG/1
150 TABLET ORAL NIGHTLY PRN
Qty: 100 TABLET | Refills: 1 | Status: SHIPPED | OUTPATIENT
Start: 2024-04-24 | End: 2024-04-24

## 2024-04-24 RX ORDER — GABAPENTIN 400 MG/1
800 CAPSULE ORAL 4 TIMES DAILY
Qty: 400 CAPSULE | Refills: 1 | Status: SHIPPED | OUTPATIENT
Start: 2024-04-24 | End: 2024-04-24

## 2024-04-24 RX ORDER — ATORVASTATIN CALCIUM 20 MG/1
20 TABLET, FILM COATED ORAL DAILY
Qty: 100 TABLET | Refills: 1 | Status: SHIPPED | OUTPATIENT
Start: 2024-04-24 | End: 2024-04-24

## 2024-04-24 RX ORDER — ESCITALOPRAM OXALATE 5 MG/1
5 TABLET ORAL DAILY
Qty: 100 TABLET | Refills: 1 | Status: SHIPPED | OUTPATIENT
Start: 2024-04-24 | End: 2024-04-24

## 2024-04-24 RX ORDER — AMITRIPTYLINE HYDROCHLORIDE 10 MG/1
10 TABLET, FILM COATED ORAL NIGHTLY
Qty: 100 TABLET | Refills: 1 | Status: SHIPPED | OUTPATIENT
Start: 2024-04-24

## 2024-04-24 ASSESSMENT — PAIN SCALES - GENERAL: PAINLEVEL: 4

## 2024-04-24 NOTE — PROGRESS NOTES
[facility]: [facility]  PROGRESS NOTE      Davi Garsia is a 68 y.o. male that is presenting today for Follow-up (6 mos fu).    Assessment/Plan   Diagnoses and all orders for this visit:  Drug-induced constipation     Post-Op pain meds, increase fruits/vegetables and fluid intake / Miralax.  Pain of right hip  -     XR hip right with pelvis when performed 2 or 3 views; Future  Gluteal pain  -     XR hip right with pelvis when performed 2 or 3 views; Future  Post laminectomy syndrome      Under control with current treatment   Continue the same   Rx E-scripted 100 days x 1  -     amitriptyline (Elavil) 10 mg tablet; Take 1 tablet (10 mg) by mouth once daily at bedtime.  Mixed hyperlipidemia     Per most recent BW well controlled  Continue current medication   Rx Escripted 100 days x 1  -     atorvastatin (Lipitor) 20 mg tablet; Take 1 tablet (20 mg) by mouth once daily.  Shoulder arthritis  -     escitalopram (Lexapro) 5 mg tablet; Take 1 tablet (5 mg) by mouth once daily.  Neuropathy     Under control with current treatment   Continue the same   Rx E-scripted 100 days x 1  -     gabapentin (Neurontin) 400 mg capsule; Take 2 capsules (800 mg) by mouth 4 times a day.  Sleep disturbances      Under control with current treatment   Continue the same   Rx E-scripted 100 days x 1  -     traZODone (Desyrel) 150 mg tablet; Take 1 tablet (150 mg) by mouth as needed at bedtime for sleep.  Other orders  -     Follow Up In Primary Care; Future  Subjective   - Patient is here today for his FUV, BW results and refills, had his Lt.shoulder 3 weeks ago doing well post-op except for constipation. Also been having Rt.hip pain with small lump after his shoulder surgery.    - Patient denies any other symptoms or concerns at this time.    - patient denies any adverse reactions to or concerns with his/her meds.      Review of Systems    All pertinent POSITIVES as noted per HPI.  All other systems have been  reviewed and are NEGATIVE and /or Noncontributory to this patient current visit or complaint.    Objective   Vitals:    04/24/24 1018   BP: 113/69   Pulse: 80   Temp: 36.4 °C (97.5 °F)   SpO2: 99%      Body mass index is 21.13 kg/m².  Physical Exam  Vitals and nursing note reviewed.   Constitutional:       Appearance: Normal appearance.   HENT:      Head: Normocephalic and atraumatic.   Neck:      Vascular: No carotid bruit.   Cardiovascular:      Rate and Rhythm: Normal rate and regular rhythm.      Pulses: Normal pulses.      Heart sounds: Normal heart sounds.   Pulmonary:      Effort: Pulmonary effort is normal.      Breath sounds: Normal breath sounds.   Abdominal:      General: Abdomen is flat. Bowel sounds are normal.      Palpations: Abdomen is soft.   Musculoskeletal:         General: No swelling. Normal range of motion.      Cervical back: Neck supple.      Right hip: Normal.      Left hip: Normal.        Legs:       Comments: 1x1 cm semi-hard mobile tender lump   Lymphadenopathy:      Cervical: No cervical adenopathy.   Skin:     General: Skin is warm and dry.   Neurological:      Mental Status: He is alert.   Psychiatric:         Mood and Affect: Mood normal.     Diagnostic Results   Lab Results   Component Value Date    GLUCOSE 100 (H) 04/22/2024    CALCIUM 9.3 04/22/2024     04/22/2024    K 4.4 04/22/2024    CO2 27 04/22/2024     04/22/2024    BUN 9 04/22/2024    CREATININE 0.90 04/22/2024     Lab Results   Component Value Date    ALT 24 04/22/2024    AST 16 04/22/2024    ALKPHOS 184 (H) 04/22/2024    BILITOT 0.3 04/22/2024     Lab Results   Component Value Date    WBC 6.1 04/22/2024    HGB 12.3 (L) 04/22/2024    HCT 39.3 (L) 04/22/2024    MCV 97 04/22/2024     04/22/2024     Lab Results   Component Value Date    CHOL 114 (L) 04/22/2024    CHOL 159 06/26/2023    CHOL 144 08/22/2022     Lab Results   Component Value Date    HDL 38.0 (L) 04/22/2024    HDL 66 06/26/2023    HDL 57  "08/22/2022     Lab Results   Component Value Date    LDLCALC 62 (L) 04/22/2024    LDLCALC 77 06/26/2023    LDLCALC 76 08/22/2022     Lab Results   Component Value Date    TRIG 70 04/22/2024    TRIG 80 06/26/2023    TRIG 57 08/22/2022     No components found for: \"CHOLHDL\"  Lab Results   Component Value Date    HGBA1C 5.4 03/15/2024     Other labs not included in the list above were reviewed either before or during this encounter.    History    Past Medical History:   Diagnosis Date    Arthritis     BPH with elevated PSA     Current every day smoker     Depression     Fibromyalgia     GERD (gastroesophageal reflux disease)     History of peritonitis     Hyperlipidemia     Insomnia     Movement disorder     Rule out [place]'s chorea versus Parkinson's    Osteoporosis     Postlaminectomy syndrome     TIA (transient ischemic attack) 2005     Past Surgical History:   Procedure Laterality Date    BACK SURGERY      X 3    CHOLECYSTECTOMY  2022    COLONOSCOPY W/ BIOPSIES      ESOPHAGOGASTRODUODENOSCOPY      MEDIPORT INSERTION, SINGLE      Has not been flushed or used in 14 years    PENILE PROSTHESIS IMPLANT      SHOULDER ARTHROSCOPY Bilateral     SPINAL CORD STIMULATOR IMPLANT      X 2.  Removal for infection both times    TOTAL KNEE ARTHROPLASTY Left      Family History   Problem Relation Name Age of Onset    Dementia Mother      Mental illness Mother      Heart attack Father      Diabetes Father      Other (brain tumor) Father      Heart disease Father      Cancer Maternal Grandfather      Leukemia Other      Pancreatic cancer Other       Social History     Socioeconomic History    Marital status:      Spouse name: Not on file    Number of children: Not on file    Years of education: Not on file    Highest education level: Not on file   Occupational History    Not on file   Tobacco Use    Smoking status: Every Day     Current packs/day: 0.50     Average packs/day: 0.5 packs/day for 52.3 years (26.2 ttl pk-yrs) "     Types: Cigarettes     Start date: 1972     Passive exposure: Current    Smokeless tobacco: Never   Vaping Use    Vaping status: Never Used   Substance and Sexual Activity    Alcohol use: Never    Drug use: Never    Sexual activity: Not on file   Other Topics Concern    Not on file   Social History Narrative    Not on file     Social Determinants of Health     Financial Resource Strain: Low Risk  (4/5/2024)    Overall Financial Resource Strain (CARDIA)     Difficulty of Paying Living Expenses: Not hard at all   Food Insecurity: Not on file   Transportation Needs: No Transportation Needs (4/7/2024)    OASIS : Transportation     Lack of Transportation (Medical): No     Lack of Transportation (Non-Medical): No     Patient Unable or Declines to Respond: No   Physical Activity: Not on file   Stress: Not on file   Social Connections: Feeling Socially Integrated (4/7/2024)    OASIS : Social Isolation     Frequency of experiencing loneliness or isolation: Never   Intimate Partner Violence: Not on file   Housing Stability: Low Risk  (4/5/2024)    Housing Stability Vital Sign     Unable to Pay for Housing in the Last Year: No     Number of Places Lived in the Last Year: 1     Unstable Housing in the Last Year: No     Allergies   Allergen Reactions    Adhesive Tape-Silicones Unknown     Reactions: Blistering    Cephalexin Unknown    Clindamycin Unknown    Linezolid Unknown    Adhesive Rash     water blisters    Penicillins Rash and Swelling     chest swelling     Current Outpatient Medications on File Prior to Visit   Medication Sig Dispense Refill    amantadine (Symmetrel) 100 mg capsule Take 1 capsule (100 mg) by mouth once daily. Do not start before April 7, 2024. 30 capsule 0    amitriptyline (Elavil) 10 mg tablet Take 1 tablet (10 mg) by mouth once daily at bedtime. 30 tablet 11    atorvastatin (Lipitor) 20 mg tablet Take 1 tablet (20 mg) by mouth once daily. 90 tablet 1    escitalopram (Lexapro) 5 mg tablet  Take 1 tablet (5 mg) by mouth once daily. Do not start before April 7, 2024. 30 tablet 0    gabapentin (Neurontin) 400 mg capsule Take 2 capsules (800 mg) by mouth 4 times a day. 360 capsule 0    HYDROmorphone (Dilaudid) 2 mg tablet Take 1 tablet (2 mg) by mouth every 4 hours if needed for severe pain (7 - 10). 25 tablet 0    ibuprofen 200 mg tablet Take 1 tablet (200 mg) by mouth every 6 hours if needed for mild pain (1 - 3).      Pump Patient Supplied Medication Inject under the skin continuously. Patient Own Pump    Meds: morphine and lidocaine  Provider name/ #:   Pump name: [employer]  Last & next fill date: next fill date is the 1st week in April      topiramate (Topamax) 100 mg tablet Take 1 tablet (100 mg) by mouth 2 times a day. 180 tablet 1    traZODone (Desyrel) 150 mg tablet TAKE 1 TABLET AS NEEDED AT BEDTIME FOR SLEEP 90 tablet 0    [DISCONTINUED] amantadine (Symmetrel) 100 mg tablet One tablet twice a day (Patient not taking: Reported on 4/24/2024) 180 tablet 3    [DISCONTINUED] amitriptyline (Elavil) 10 mg tablet Take 1 tablet (10 mg) by mouth once daily at bedtime. (Patient not taking: Reported on 4/24/2024) 30 tablet 0    [DISCONTINUED] atorvastatin (Lipitor) 20 mg tablet Take 1 tablet (20 mg) by mouth once daily at bedtime. (Patient not taking: Reported on 4/24/2024) 30 tablet 0    [DISCONTINUED] escitalopram (Lexapro) 5 mg tablet Take 1 tablet (5 mg) by mouth once daily. (Patient taking differently: Take 1 tablet (5 mg) by mouth once daily in the morning.) 90 tablet 1    [DISCONTINUED] gabapentin (Neurontin) 800 mg tablet Take 1 tablet (800 mg) by mouth 3 times a day. (Patient not taking: Reported on 4/24/2024) 90 tablet 0    [DISCONTINUED] ketamine (Ketalar) 4 mg/mL swish & spit solution Swish and spit 10 mL 1 time. Swish and swallow prn      [DISCONTINUED] ondansetron ODT (Zofran-ODT) 4 mg disintegrating tablet Take 1 tablet (4 mg) by mouth every 8 hours if needed.      [DISCONTINUED]  oxyCODONE-acetaminophen (Percocet) 5-325 mg tablet Take 1 tablet by mouth every 4 hours if needed for moderate pain (4 - 6). (Patient not taking: Reported on 4/24/2024) 5 tablet 0    [DISCONTINUED] topiramate (Topamax) 100 mg tablet Take 1 tablet (100 mg) by mouth 2 times a day. 60 tablet 0     No current facility-administered medications on file prior to visit.     Immunization History   Administered Date(s) Administered    Covaxin Sars-cov-2 Vaccination 12/09/2023    Flu vaccine, quadrivalent, high-dose, preservative free, age 65y+ (FLUZONE) 12/17/2020, 10/04/2021, 10/25/2022, 10/25/2023    Influenza, High Dose Seasonal, Preservative Free 10/01/2019, 09/07/2020    Influenza, Unspecified 10/01/2001, 10/01/2009    Influenza, injectable, quadrivalent 10/07/2016    Influenza, seasonal, injectable 09/29/2012, 10/24/2013, 10/09/2015, 09/19/2018    Novel influenza-H1N1-09, preservative-free 01/04/2010    Pfizer COVID-19 vaccine, bivalent, age 12 years and older (30 mcg/0.3 mL) 10/25/2022    Pfizer Purple Cap SARS-CoV-2 03/24/2021, 04/23/2021, 11/11/2021    Pneumococcal conjugate vaccine, 13-valent (PREVNAR 13) 10/07/2016    Pneumococcal conjugate vaccine, 20-valent (PREVNAR 20) 02/15/2023    Pneumococcal polysaccharide vaccine, 23-valent, age 2 years and older (PNEUMOVAX 23) 01/01/2008, 03/24/2010, 09/04/2018, 07/17/2020    RSV, 60 Years And Older (AREXVY) 12/09/2023    Td vaccine, age 7 years and older (TENIVAC) 07/23/1996    Tdap vaccine, age 7 year and older (BOOSTRIX, ADACEL) 10/01/2019     Patient's medical history was reviewed and updated either before or during this encounter.       Alcira Wooten MD

## 2024-04-25 ENCOUNTER — HOME CARE VISIT (OUTPATIENT)
Dept: HOME HEALTH SERVICES | Facility: HOME HEALTH | Age: 69
End: 2024-04-25
Payer: MEDICARE

## 2024-04-25 VITALS
OXYGEN SATURATION: 100 % | SYSTOLIC BLOOD PRESSURE: 100 MMHG | HEART RATE: 68 BPM | RESPIRATION RATE: 18 BRPM | DIASTOLIC BLOOD PRESSURE: 60 MMHG

## 2024-04-25 PROBLEM — E66.9 OBESITY WITH BODY MASS INDEX 30 OR GREATER: Status: RESOLVED | Noted: 2024-04-02 | Resolved: 2024-04-25

## 2024-04-25 PROBLEM — I82.539 CHRONIC DEEP VEIN THROMBOSIS (DVT) OF POPLITEAL VEIN (MULTI): Status: ACTIVE | Noted: 2022-06-22

## 2024-04-25 PROBLEM — G10 HUNTINGTON'S DISEASE (MULTI): Status: RESOLVED | Noted: 2022-06-22 | Resolved: 2024-04-25

## 2024-04-25 PROBLEM — E78.00 ELEVATED LOW DENSITY LIPOPROTEIN (LDL) CHOLESTEROL LEVEL: Status: RESOLVED | Noted: 2023-08-23 | Resolved: 2024-04-25

## 2024-04-25 PROBLEM — N39.0 URINARY TRACT INFECTION: Status: RESOLVED | Noted: 2024-04-02 | Resolved: 2024-04-25

## 2024-04-25 PROCEDURE — 1090000001 HH PPS REVENUE CREDIT

## 2024-04-25 PROCEDURE — 1090000002 HH PPS REVENUE DEBIT

## 2024-04-25 PROCEDURE — G0157 HHC PT ASSISTANT EA 15: HCPCS | Mod: CQ,HHH

## 2024-04-25 ASSESSMENT — ENCOUNTER SYMPTOMS
PAIN: 1
LOWEST PAIN SEVERITY IN PAST 24 HOURS: 3/10
PERSON REPORTING PAIN: PATIENT
HIGHEST PAIN SEVERITY IN PAST 24 HOURS: 6/10
SUBJECTIVE PAIN PROGRESSION: GRADUALLY IMPROVING

## 2024-04-26 ENCOUNTER — HOME CARE VISIT (OUTPATIENT)
Dept: HOME HEALTH SERVICES | Facility: HOME HEALTH | Age: 69
End: 2024-04-26
Payer: MEDICARE

## 2024-04-26 PROCEDURE — G0156 HHCP-SVS OF AIDE,EA 15 MIN: HCPCS | Mod: HHH

## 2024-04-26 PROCEDURE — 1090000002 HH PPS REVENUE DEBIT

## 2024-04-26 PROCEDURE — 1090000001 HH PPS REVENUE CREDIT

## 2024-04-27 PROCEDURE — 1090000002 HH PPS REVENUE DEBIT

## 2024-04-27 PROCEDURE — 1090000001 HH PPS REVENUE CREDIT

## 2024-04-28 PROCEDURE — 1090000002 HH PPS REVENUE DEBIT

## 2024-04-28 PROCEDURE — 1090000001 HH PPS REVENUE CREDIT

## 2024-04-29 PROCEDURE — 1090000002 HH PPS REVENUE DEBIT

## 2024-04-29 PROCEDURE — 1090000001 HH PPS REVENUE CREDIT

## 2024-04-30 ENCOUNTER — HOME CARE VISIT (OUTPATIENT)
Dept: HOME HEALTH SERVICES | Facility: HOME HEALTH | Age: 69
End: 2024-04-30
Payer: MEDICARE

## 2024-04-30 PROCEDURE — 1090000001 HH PPS REVENUE CREDIT

## 2024-04-30 PROCEDURE — 1090000002 HH PPS REVENUE DEBIT

## 2024-04-30 PROCEDURE — G0156 HHCP-SVS OF AIDE,EA 15 MIN: HCPCS | Mod: HHH

## 2024-05-01 ENCOUNTER — HOME CARE VISIT (OUTPATIENT)
Dept: HOME HEALTH SERVICES | Facility: HOME HEALTH | Age: 69
End: 2024-05-01
Payer: MEDICARE

## 2024-05-01 PROCEDURE — 1090000001 HH PPS REVENUE CREDIT

## 2024-05-01 PROCEDURE — 1090000002 HH PPS REVENUE DEBIT

## 2024-05-02 ENCOUNTER — HOSPITAL ENCOUNTER (EMERGENCY)
Facility: HOSPITAL | Age: 69
Discharge: HOME | End: 2024-05-02
Payer: MEDICARE

## 2024-05-02 ENCOUNTER — APPOINTMENT (OUTPATIENT)
Dept: RADIOLOGY | Facility: HOSPITAL | Age: 69
End: 2024-05-02
Payer: MEDICARE

## 2024-05-02 VITALS
WEIGHT: 126.1 LBS | HEART RATE: 60 BPM | OXYGEN SATURATION: 95 % | SYSTOLIC BLOOD PRESSURE: 158 MMHG | BODY MASS INDEX: 18.05 KG/M2 | DIASTOLIC BLOOD PRESSURE: 90 MMHG | HEIGHT: 70 IN | RESPIRATION RATE: 20 BRPM | TEMPERATURE: 96.1 F

## 2024-05-02 DIAGNOSIS — K59.00 CONSTIPATION, UNSPECIFIED CONSTIPATION TYPE: ICD-10-CM

## 2024-05-02 DIAGNOSIS — R10.84 ABDOMINAL PAIN, GENERALIZED: Primary | ICD-10-CM

## 2024-05-02 LAB
ALBUMIN SERPL-MCNC: 4 G/DL (ref 3.5–5)
ALP BLD-CCNC: 197 U/L (ref 35–125)
ALT SERPL-CCNC: 31 U/L (ref 5–40)
ANION GAP SERPL CALC-SCNC: 11 MMOL/L
APPEARANCE UR: ABNORMAL
AST SERPL-CCNC: 21 U/L (ref 5–40)
BASOPHILS # BLD AUTO: 0.04 X10*3/UL (ref 0–0.1)
BASOPHILS NFR BLD AUTO: 0.5 %
BILIRUB SERPL-MCNC: 0.4 MG/DL (ref 0.1–1.2)
BILIRUB UR STRIP.AUTO-MCNC: NEGATIVE MG/DL
BUN SERPL-MCNC: 12 MG/DL (ref 8–25)
CALCIUM SERPL-MCNC: 9.4 MG/DL (ref 8.5–10.4)
CHLORIDE SERPL-SCNC: 102 MMOL/L (ref 97–107)
CO2 SERPL-SCNC: 23 MMOL/L (ref 24–31)
COLOR UR: COLORLESS
CREAT SERPL-MCNC: 0.8 MG/DL (ref 0.4–1.6)
EGFRCR SERPLBLD CKD-EPI 2021: >90 ML/MIN/1.73M*2
EOSINOPHIL # BLD AUTO: 0.09 X10*3/UL (ref 0–0.7)
EOSINOPHIL NFR BLD AUTO: 1.2 %
ERYTHROCYTE [DISTWIDTH] IN BLOOD BY AUTOMATED COUNT: 12.4 % (ref 11.5–14.5)
GLUCOSE SERPL-MCNC: 110 MG/DL (ref 65–99)
GLUCOSE UR STRIP.AUTO-MCNC: NORMAL MG/DL
HCT VFR BLD AUTO: 40.2 % (ref 41–52)
HGB BLD-MCNC: 13.5 G/DL (ref 13.5–17.5)
IMM GRANULOCYTES # BLD AUTO: 0.02 X10*3/UL (ref 0–0.7)
IMM GRANULOCYTES NFR BLD AUTO: 0.3 % (ref 0–0.9)
KETONES UR STRIP.AUTO-MCNC: ABNORMAL MG/DL
LEUKOCYTE ESTERASE UR QL STRIP.AUTO: NEGATIVE
LIPASE SERPL-CCNC: 34 U/L (ref 16–63)
LYMPHOCYTES # BLD AUTO: 1.7 X10*3/UL (ref 1.2–4.8)
LYMPHOCYTES NFR BLD AUTO: 23.2 %
MCH RBC QN AUTO: 30.2 PG (ref 26–34)
MCHC RBC AUTO-ENTMCNC: 33.6 G/DL (ref 32–36)
MCV RBC AUTO: 90 FL (ref 80–100)
MONOCYTES # BLD AUTO: 0.62 X10*3/UL (ref 0.1–1)
MONOCYTES NFR BLD AUTO: 8.5 %
NEUTROPHILS # BLD AUTO: 4.85 X10*3/UL (ref 1.2–7.7)
NEUTROPHILS NFR BLD AUTO: 66.3 %
NITRITE UR QL STRIP.AUTO: NEGATIVE
NRBC BLD-RTO: 0 /100 WBCS (ref 0–0)
PH UR STRIP.AUTO: 7.5 [PH]
PLATELET # BLD AUTO: 225 X10*3/UL (ref 150–450)
POTASSIUM SERPL-SCNC: 3.3 MMOL/L (ref 3.4–5.1)
PROT SERPL-MCNC: 7.2 G/DL (ref 5.9–7.9)
PROT UR STRIP.AUTO-MCNC: NEGATIVE MG/DL
RBC # BLD AUTO: 4.47 X10*6/UL (ref 4.5–5.9)
RBC # UR STRIP.AUTO: NEGATIVE /UL
SODIUM SERPL-SCNC: 136 MMOL/L (ref 133–145)
SP GR UR STRIP.AUTO: 1.01
UROBILINOGEN UR STRIP.AUTO-MCNC: NORMAL MG/DL
WBC # BLD AUTO: 7.3 X10*3/UL (ref 4.4–11.3)

## 2024-05-02 PROCEDURE — 2500000004 HC RX 250 GENERAL PHARMACY W/ HCPCS (ALT 636 FOR OP/ED)

## 2024-05-02 PROCEDURE — 1090000001 HH PPS REVENUE CREDIT

## 2024-05-02 PROCEDURE — 81003 URINALYSIS AUTO W/O SCOPE: CPT

## 2024-05-02 PROCEDURE — 83690 ASSAY OF LIPASE: CPT

## 2024-05-02 PROCEDURE — 96374 THER/PROPH/DIAG INJ IV PUSH: CPT | Mod: 59

## 2024-05-02 PROCEDURE — 85025 COMPLETE CBC W/AUTO DIFF WBC: CPT

## 2024-05-02 PROCEDURE — 74177 CT ABD & PELVIS W/CONTRAST: CPT

## 2024-05-02 PROCEDURE — 2550000001 HC RX 255 CONTRASTS

## 2024-05-02 PROCEDURE — 96375 TX/PRO/DX INJ NEW DRUG ADDON: CPT

## 2024-05-02 PROCEDURE — 74177 CT ABD & PELVIS W/CONTRAST: CPT | Performed by: RADIOLOGY

## 2024-05-02 PROCEDURE — 96361 HYDRATE IV INFUSION ADD-ON: CPT

## 2024-05-02 PROCEDURE — 80053 COMPREHEN METABOLIC PANEL: CPT

## 2024-05-02 PROCEDURE — 99284 EMERGENCY DEPT VISIT MOD MDM: CPT | Mod: 25

## 2024-05-02 PROCEDURE — 36415 COLL VENOUS BLD VENIPUNCTURE: CPT

## 2024-05-02 PROCEDURE — 1090000002 HH PPS REVENUE DEBIT

## 2024-05-02 RX ORDER — POLYETHYLENE GLYCOL 3350 17 G/17G
17 POWDER, FOR SOLUTION ORAL DAILY
Qty: 3 PACKET | Refills: 0 | Status: SHIPPED | OUTPATIENT
Start: 2024-05-02 | End: 2024-05-05

## 2024-05-02 RX ORDER — KETOROLAC TROMETHAMINE 30 MG/ML
15 INJECTION, SOLUTION INTRAMUSCULAR; INTRAVENOUS ONCE
Status: COMPLETED | OUTPATIENT
Start: 2024-05-02 | End: 2024-05-02

## 2024-05-02 RX ORDER — ONDANSETRON HYDROCHLORIDE 2 MG/ML
4 INJECTION, SOLUTION INTRAVENOUS ONCE
Status: COMPLETED | OUTPATIENT
Start: 2024-05-02 | End: 2024-05-02

## 2024-05-02 RX ORDER — SYRING-NEEDL,DISP,INSUL,0.3 ML 29 G X1/2"
296 SYRINGE, EMPTY DISPOSABLE MISCELLANEOUS ONCE
Qty: 296 ML | Refills: 0 | Status: SHIPPED | OUTPATIENT
Start: 2024-05-02 | End: 2024-05-02

## 2024-05-02 RX ORDER — MORPHINE SULFATE 4 MG/ML
4 INJECTION, SOLUTION INTRAMUSCULAR; INTRAVENOUS ONCE
Status: COMPLETED | OUTPATIENT
Start: 2024-05-02 | End: 2024-05-02

## 2024-05-02 RX ADMIN — KETOROLAC TROMETHAMINE 15 MG: 30 INJECTION, SOLUTION INTRAMUSCULAR at 20:29

## 2024-05-02 RX ADMIN — SODIUM CHLORIDE 1000 ML: 900 INJECTION, SOLUTION INTRAVENOUS at 20:28

## 2024-05-02 RX ADMIN — IOHEXOL 75 ML: 350 INJECTION, SOLUTION INTRAVENOUS at 21:23

## 2024-05-02 RX ADMIN — ONDANSETRON 4 MG: 2 INJECTION INTRAMUSCULAR; INTRAVENOUS at 20:28

## 2024-05-02 RX ADMIN — MORPHINE SULFATE 4 MG: 4 INJECTION, SOLUTION INTRAMUSCULAR; INTRAVENOUS at 21:28

## 2024-05-02 ASSESSMENT — PAIN DESCRIPTION - PAIN TYPE: TYPE: ACUTE PAIN

## 2024-05-02 ASSESSMENT — PAIN DESCRIPTION - LOCATION
LOCATION: ABDOMEN
LOCATION: ABDOMEN

## 2024-05-02 ASSESSMENT — PAIN SCALES - GENERAL
PAINLEVEL_OUTOF10: 8
PAINLEVEL_OUTOF10: 8
PAINLEVEL_OUTOF10: 9
PAINLEVEL_OUTOF10: 4

## 2024-05-02 ASSESSMENT — PAIN - FUNCTIONAL ASSESSMENT
PAIN_FUNCTIONAL_ASSESSMENT: 0-10

## 2024-05-02 ASSESSMENT — PAIN DESCRIPTION - PROGRESSION: CLINICAL_PROGRESSION: NOT CHANGED

## 2024-05-02 ASSESSMENT — PAIN SCALES - PAIN ASSESSMENT IN ADVANCED DEMENTIA (PAINAD): TOTALSCORE: MEDICATION (SEE MAR)

## 2024-05-02 ASSESSMENT — PAIN DESCRIPTION - DESCRIPTORS: DESCRIPTORS: ACHING;CRAMPING;DISCOMFORT

## 2024-05-02 ASSESSMENT — PAIN DESCRIPTION - FREQUENCY: FREQUENCY: CONSTANT/CONTINUOUS

## 2024-05-03 ENCOUNTER — HOME CARE VISIT (OUTPATIENT)
Dept: HOME HEALTH SERVICES | Facility: HOME HEALTH | Age: 69
End: 2024-05-03
Payer: MEDICARE

## 2024-05-03 PROCEDURE — G0156 HHCP-SVS OF AIDE,EA 15 MIN: HCPCS | Mod: HHH

## 2024-05-03 PROCEDURE — 1090000002 HH PPS REVENUE DEBIT

## 2024-05-03 PROCEDURE — 1090000001 HH PPS REVENUE CREDIT

## 2024-05-03 NOTE — ED PROVIDER NOTES
HPI   Chief Complaint   Patient presents with    Abdominal Pain     68 Y/PO male presents to the ED via EMS for acute abdominal pain. No bowel movements it 4 days, denies N/V, internal pain pump with morphine and lidocaine. Afebrile       Patient is a 68-year-old male with past medical history of arthritis, fibromyalgia, HLD, abdominal pain presenting with abdominal pain.  States that for the last 4 to 5 days, he is not having umbilical and suprapubic abdominal pain.  Does endorse nausea without vomiting.  States that is been progressively getting worse.  States he does have a pain pump in his abdomen for chronic back pain.  Denies fevers, chills, cough, sore throat, runny nose, chest pain, shortness of breath, urinary complaints, diarrhea.  States he does get constipation and states he had a small bowel movement today but does not recall when he last had a regular bowel movement.  Patient was recently in the hospital for something similar and per CAT scan was found to have moderate stool burden.                          Rusty Coma Scale Score: 15                     Patient History   Past Medical History:   Diagnosis Date    Arthritis     BPH with elevated PSA     Current every day smoker     Depression     Fibromyalgia     GERD (gastroesophageal reflux disease)     History of peritonitis     Hyperlipidemia     Insomnia     Movement disorder     Rule out [place]'s chorea versus Parkinson's    Osteoporosis     Postlaminectomy syndrome     TIA (transient ischemic attack) 2005     Past Surgical History:   Procedure Laterality Date    BACK SURGERY      X 3    CHOLECYSTECTOMY  2022    COLONOSCOPY W/ BIOPSIES      ESOPHAGOGASTRODUODENOSCOPY      MEDIPORT INSERTION, SINGLE      Has not been flushed or used in 14 years    PENILE PROSTHESIS IMPLANT      SHOULDER ARTHROSCOPY Bilateral     SPINAL CORD STIMULATOR IMPLANT      X 2.  Removal for infection both times    TOTAL KNEE ARTHROPLASTY Left      Family History    Problem Relation Name Age of Onset    Dementia Mother      Mental illness Mother      Heart attack Father      Diabetes Father      Other (brain tumor) Father      Heart disease Father      Cancer Maternal Grandfather      Leukemia Other      Pancreatic cancer Other       Social History     Tobacco Use    Smoking status: Every Day     Current packs/day: 0.50     Average packs/day: 0.5 packs/day for 52.3 years (26.2 ttl pk-yrs)     Types: Cigarettes     Start date: 1972     Passive exposure: Current    Smokeless tobacco: Never   Vaping Use    Vaping status: Never Used   Substance Use Topics    Alcohol use: Never    Drug use: Never       Physical Exam   ED Triage Vitals [05/02/24 2020]   Temperature Heart Rate Respirations BP   35.6 °C (96.1 °F) 55 20 (!) 157/122      Pulse Ox Temp Source Heart Rate Source Patient Position   99 % Oral Monitor Lying      BP Location FiO2 (%)     Left arm --       Physical Exam  Constitutional:       Comments: Awake, uncomfortable appearing, laying in examination bed   HENT:      Head: Normocephalic and atraumatic.      Mouth/Throat:      Mouth: Mucous membranes are moist.      Pharynx: Oropharynx is clear.   Eyes:      Extraocular Movements: Extraocular movements intact.      Pupils: Pupils are equal, round, and reactive to light.   Cardiovascular:      Rate and Rhythm: Normal rate and regular rhythm.      Heart sounds: Normal heart sounds.   Pulmonary:      Effort: Pulmonary effort is normal.      Breath sounds: Normal breath sounds.   Abdominal:      General: Abdomen is flat. Bowel sounds are normal.      Palpations: Abdomen is soft.      Tenderness: There is generalized abdominal tenderness.      Comments: Pain pump present in the abdomen   Skin:     General: Skin is warm and dry.      Capillary Refill: Capillary refill takes less than 2 seconds.   Neurological:      General: No focal deficit present.      Mental Status: He is alert and oriented to person, place, and time.    Psychiatric:         Mood and Affect: Mood normal.         Behavior: Behavior normal.         ED Course & MDM   ED Course as of 05/02/24 2229   Thu May 02, 2024   2048 Sinus bradycardia with a rate of 49.  MD interval is 134.  QRS duration is 150.  Right bundle branch block.  No evidence of ischemia.  Similar to EKG of February 21, 2024 [JN]      ED Course User Index  [JN] Cirilo Blevins MD         Diagnoses as of 05/02/24 2229   Abdominal pain, generalized   Constipation, unspecified constipation type       Medical Decision Making  Patient is a 68-year-old male with past medical history of arthritis, fibromyalgia, HLD, abdominal pain presenting with abdominal pain.  CBC, CMP, lipase, urine, CT abdomen pelvis ordered.  IV fluids, Toradol, Zofran ordered.  Conditions considered include but not limited to: Appendicitis, pancreatitis, cholecystitis, small bowel obstruction, constipation.  Toradol did not seem to help this patient, morphine ordered.  Morphine did appear to help make this patient more comfortable.    I did discuss this case with attending physician Dr. Mercado.  CBC is without leukocytosis or anemia.  CMP without significant electrolyte abnormality or renal impairment.  UA with micro is without infection.  Lipase within normal limits.  CT abdomen pelvis does show moderate stool burden.    Patient states that he does have follow-up with primary gastroenterology on Monday.  I did recommend he follow-up with this writer.  Did discuss taking MiraLAX as well as magnesium citrate to help with constipation.  I believe this patient is at low risk for complication, and a disoposition of discharge is acceptable.  Return to the Emergency Department if new or worsening symptoms including headache, fever, chills, chest pain, shortness of breath, syncope, near syncope, abdominal pain, nausea, vomiting,  diarrhea, or worsening pain.  Discussed with patient that he should continue with fiber supplements.  Did  discuss taking MiraLAX per capful daily until soft stools.  Did recommend he take magnesium citrate in order to help with his constipation more readily.  Patient is agreeable to disposition of discharge with follow-up with his gastroenterology provider on Monday as well as to use the constipation cocktails magnesium citrate, fiber supplements, MiraLAX.    Portions of this note made with Dragon software, please be mindful of potential grammatical errors.        Medications   sodium chloride 0.9 % bolus 1,000 mL (0 mL intravenous Stopped 5/2/24 2245)   ondansetron (Zofran) injection 4 mg (4 mg intravenous Given 5/2/24 2028)   ketorolac (Toradol) injection 15 mg (15 mg intravenous Given 5/2/24 2029)   iohexol (OMNIPaque) 350 mg iodine/mL solution 75 mL (75 mL intravenous Given 5/2/24 2123)   morphine injection 4 mg (4 mg intravenous Given 5/2/24 2128)         Labs Reviewed   COMPREHENSIVE METABOLIC PANEL - Abnormal       Result Value    Glucose 110 (*)     Sodium 136      Potassium 3.3 (*)     Chloride 102      Bicarbonate 23 (*)     Urea Nitrogen 12      Creatinine 0.80      eGFR >90      Calcium 9.4      Albumin 4.0      Alkaline Phosphatase 197 (*)     Total Protein 7.2      AST 21      Bilirubin, Total 0.4      ALT 31      Anion Gap 11     CBC WITH AUTO DIFFERENTIAL - Abnormal    WBC 7.3      nRBC 0.0      RBC 4.47 (*)     Hemoglobin 13.5      Hematocrit 40.2 (*)     MCV 90      MCH 30.2      MCHC 33.6      RDW 12.4      Platelets 225      Neutrophils % 66.3      Immature Granulocytes %, Automated 0.3      Lymphocytes % 23.2      Monocytes % 8.5      Eosinophils % 1.2      Basophils % 0.5      Neutrophils Absolute 4.85      Immature Granulocytes Absolute, Automated 0.02      Lymphocytes Absolute 1.70      Monocytes Absolute 0.62      Eosinophils Absolute 0.09      Basophils Absolute 0.04     URINALYSIS WITH REFLEX CULTURE AND MICROSCOPIC - Abnormal    Color, Urine Colorless (*)     Appearance, Urine Ex.Turbid (*)      Specific Gravity, Urine 1.010      pH, Urine 7.5      Protein, Urine NEGATIVE      Glucose, Urine Normal      Blood, Urine NEGATIVE      Ketones, Urine TRACE (*)     Bilirubin, Urine NEGATIVE      Urobilinogen, Urine Normal      Nitrite, Urine NEGATIVE      Leukocyte Esterase, Urine NEGATIVE     LIPASE - Normal    Lipase 34     URINALYSIS WITH REFLEX CULTURE AND MICROSCOPIC    Narrative:     The following orders were created for panel order Urinalysis with Reflex Culture and Microscopic.  Procedure                               Abnormality         Status                     ---------                               -----------         ------                     Urinalysis with Reflex C...[[id number]]  Abnormal            Final result               Extra Urine Gray Tube[[id number]]                                                         Please view results for these tests on the individual orders.   EXTRA URINE GRAY TUBE         CT abdomen pelvis w IV contrast   Final Result   1. No acute abnormality identified in the abdomen or pelvis.   2. Moderate-to-large colonic stool burden.   3. Mild intra and extrahepatic biliary ductal dilatation which may be   related to post cholecystectomy reservoir state. Correlate for   clinical/laboratory evidence of cholestasis.   4. Please see additional findings and discussion as above.        MACRO:   None        Signed by: Shiraz Owens 5/2/2024 9:55 PM   Dictation workstation:   [id number]            Procedure  Procedures     Claudy Hector PA-C  05/03/24 1546

## 2024-05-04 PROCEDURE — 1090000002 HH PPS REVENUE DEBIT

## 2024-05-04 PROCEDURE — 1090000001 HH PPS REVENUE CREDIT

## 2024-05-05 PROCEDURE — 1090000002 HH PPS REVENUE DEBIT

## 2024-05-05 PROCEDURE — 1090000001 HH PPS REVENUE CREDIT

## 2024-05-06 ENCOUNTER — HOSPITAL ENCOUNTER (OUTPATIENT)
Dept: RADIOLOGY | Facility: CLINIC | Age: 69
Discharge: HOME | End: 2024-05-06
Payer: MEDICARE

## 2024-05-06 DIAGNOSIS — M25.551 PAIN OF RIGHT HIP: ICD-10-CM

## 2024-05-06 DIAGNOSIS — M79.18 GLUTEAL PAIN: ICD-10-CM

## 2024-05-06 PROCEDURE — 73502 X-RAY EXAM HIP UNI 2-3 VIEWS: CPT | Mod: RIGHT SIDE | Performed by: RADIOLOGY

## 2024-05-06 PROCEDURE — 1090000002 HH PPS REVENUE DEBIT

## 2024-05-06 PROCEDURE — 1090000001 HH PPS REVENUE CREDIT

## 2024-05-06 PROCEDURE — 73502 X-RAY EXAM HIP UNI 2-3 VIEWS: CPT | Mod: RT

## 2024-05-07 DIAGNOSIS — M19.019 SHOULDER ARTHRITIS: ICD-10-CM

## 2024-05-07 PROCEDURE — 1090000001 HH PPS REVENUE CREDIT

## 2024-05-07 PROCEDURE — 1090000002 HH PPS REVENUE DEBIT

## 2024-05-08 PROCEDURE — 1090000001 HH PPS REVENUE CREDIT

## 2024-05-08 PROCEDURE — 1090000002 HH PPS REVENUE DEBIT

## 2024-05-09 ENCOUNTER — HOME CARE VISIT (OUTPATIENT)
Dept: HOME HEALTH SERVICES | Facility: HOME HEALTH | Age: 69
End: 2024-05-09
Payer: MEDICARE

## 2024-05-09 PROCEDURE — 1090000002 HH PPS REVENUE DEBIT

## 2024-05-09 PROCEDURE — 1090000001 HH PPS REVENUE CREDIT

## 2024-05-09 NOTE — Clinical Note
Thank you!      ----- Message -----  From: Ml Parks, OT  Sent: 5/9/2024   2:32 PM EDT  To: Jaci Walsh, PT      Pt ill this date, will reschedule eval for next week

## 2024-05-10 ENCOUNTER — APPOINTMENT (OUTPATIENT)
Dept: RADIOLOGY | Facility: CLINIC | Age: 69
End: 2024-05-10
Payer: MEDICARE

## 2024-05-10 PROCEDURE — 1090000001 HH PPS REVENUE CREDIT

## 2024-05-10 PROCEDURE — 1090000002 HH PPS REVENUE DEBIT

## 2024-05-11 PROCEDURE — 1090000001 HH PPS REVENUE CREDIT

## 2024-05-11 PROCEDURE — 1090000002 HH PPS REVENUE DEBIT

## 2024-05-12 PROCEDURE — 1090000001 HH PPS REVENUE CREDIT

## 2024-05-12 PROCEDURE — 1090000002 HH PPS REVENUE DEBIT

## 2024-05-13 ENCOUNTER — HOME CARE VISIT (OUTPATIENT)
Dept: HOME HEALTH SERVICES | Facility: HOME HEALTH | Age: 69
End: 2024-05-13
Payer: MEDICARE

## 2024-05-13 VITALS
HEART RATE: 59 BPM | TEMPERATURE: 98.8 F | SYSTOLIC BLOOD PRESSURE: 118 MMHG | RESPIRATION RATE: 16 BRPM | OXYGEN SATURATION: 98 % | DIASTOLIC BLOOD PRESSURE: 64 MMHG

## 2024-05-13 PROCEDURE — G0151 HHCP-SERV OF PT,EA 15 MIN: HCPCS | Mod: HHH

## 2024-05-13 PROCEDURE — 1090000002 HH PPS REVENUE DEBIT

## 2024-05-13 PROCEDURE — 1090000001 HH PPS REVENUE CREDIT

## 2024-05-13 PROCEDURE — 0023 HH SOC

## 2024-05-13 SDOH — HEALTH STABILITY: PHYSICAL HEALTH
EXERCISE COMMENTS: PT REVIEWED HEP AND EDUCATED PATIENT ON IMPORTANCE OF CONTINUING EXERCISE PROGRAM TO MAXIMIZE STRENGTH GAINS.

## 2024-05-13 ASSESSMENT — GAIT ASSESSMENTS
WALKING STANCE: 1 - HEELS ALMOST TOUCHING WHILE WALKING
PATH: 1 - MILD/MODERATE DEVIATION OR USES WALKING AID
GAIT SCORE: 10
TRUNK: 1 - NO SWAY BUT FLEXION OF KNEES OR BACK OR SPREADS ARMS WHILE WALKING
PATH SCORE: 1
STEP SYMMETRY: 1 - RIGHT AND LEFT STEP LENGTH APPEAR EQUAL
TRUNK SCORE: 1
STEP CONTINUITY: 1 - STEPS APPEAR CONTINUOUS
BALANCE AND GAIT SCORE: 23
INITIATION OF GAIT IMMEDIATELY AFTER GO: 1 - NO HESITANCY

## 2024-05-13 ASSESSMENT — BALANCE ASSESSMENTS
ARISES: 2 - ABLE WITHOUT USING ARMS
TURNING 360 DEGREES STEPS: 1 - CONTINUOUS STEPS
NUDGED: 1 - STAGGERS, GRABS, CATCHES SELF
IMMEDIATE STANDING BALANCE FIRST 5 SECONDS: 2 - STEADY WITHOUT WALKER OR OTHER SUPPORT
BALANCE SCORE: 13
ATTEMPTS TO ARISE: 2 - ABLE TO RISE, ONE ATTEMPT
SITTING DOWN: 2 - SAFE, SMOOTH MOTION
EYES CLOSED AT MAXIMUM POSITION NUDGED: 1 - STEADY
NUDGED SCORE: 1
ARISING SCORE: 2
SITTING BALANCE: 1 - STEADY, SAFE
STANDING BALANCE: 1 - STEADY BUT WIDE STANCE AND USES CANE OR OTHER SUPPORT

## 2024-05-13 ASSESSMENT — ENCOUNTER SYMPTOMS
PERSON REPORTING PAIN: PATIENT
HIGHEST PAIN SEVERITY IN PAST 24 HOURS: 6/10
LOWEST PAIN SEVERITY IN PAST 24 HOURS: 0/10
PAIN: 1

## 2024-05-13 ASSESSMENT — ACTIVITIES OF DAILY LIVING (ADL)
AMBULATION_DISTANCE/DURATION_TOLERATED: 200 FT.
AMBULATION ASSISTANCE ON FLAT SURFACES: 1

## 2024-05-14 PROCEDURE — 1090000001 HH PPS REVENUE CREDIT

## 2024-05-14 PROCEDURE — 1090000002 HH PPS REVENUE DEBIT

## 2024-05-14 RX ORDER — ATORVASTATIN CALCIUM 20 MG/1
20 TABLET, FILM COATED ORAL NIGHTLY
Qty: 100 TABLET | Refills: 1 | Status: SHIPPED | OUTPATIENT
Start: 2024-05-14

## 2024-05-15 PROCEDURE — 1090000002 HH PPS REVENUE DEBIT

## 2024-05-15 PROCEDURE — 1090000001 HH PPS REVENUE CREDIT

## 2024-05-16 ENCOUNTER — HOME CARE VISIT (OUTPATIENT)
Dept: HOME HEALTH SERVICES | Facility: HOME HEALTH | Age: 69
End: 2024-05-16
Payer: MEDICARE

## 2024-05-16 PROCEDURE — G0152 HHCP-SERV OF OT,EA 15 MIN: HCPCS | Mod: HHH

## 2024-05-16 PROCEDURE — 1090000002 HH PPS REVENUE DEBIT

## 2024-05-16 PROCEDURE — 1090000001 HH PPS REVENUE CREDIT

## 2024-05-16 ASSESSMENT — ENCOUNTER SYMPTOMS
PAIN: 1
PAIN LOCATION - PAIN SEVERITY: 6/10
PERSON REPORTING PAIN: PATIENT
LOWEST PAIN SEVERITY IN PAST 24 HOURS: 6/10
PAIN LOCATION: BACK
HIGHEST PAIN SEVERITY IN PAST 24 HOURS: 6/10
SUBJECTIVE PAIN PROGRESSION: UNCHANGED

## 2024-05-16 ASSESSMENT — PAIN SCALES - PAIN ASSESSMENT IN ADVANCED DEMENTIA (PAINAD)
NEGVOCALIZATION: 0
FACIALEXPRESSION: 0
BREATHING: 0
NEGVOCALIZATION: 0 - NONE.
BODYLANGUAGE: 0 - RELAXED.
TOTALSCORE: 0
FACIALEXPRESSION: 0 - SMILING OR INEXPRESSIVE.
BODYLANGUAGE: 0
CONSOLABILITY: 0
CONSOLABILITY: 0 - NO NEED TO CONSOLE.

## 2024-05-16 ASSESSMENT — ACTIVITIES OF DAILY LIVING (ADL)
PHYSICAL TRANSFERS ASSESSED: 1
CURRENT_FUNCTION: INDEPENDENT

## 2024-05-17 PROCEDURE — 1090000002 HH PPS REVENUE DEBIT

## 2024-05-17 PROCEDURE — 1090000001 HH PPS REVENUE CREDIT

## 2024-05-18 PROCEDURE — 1090000002 HH PPS REVENUE DEBIT

## 2024-05-18 PROCEDURE — 1090000001 HH PPS REVENUE CREDIT

## 2024-05-19 PROCEDURE — 1090000001 HH PPS REVENUE CREDIT

## 2024-05-19 PROCEDURE — 1090000002 HH PPS REVENUE DEBIT

## 2024-05-20 PROCEDURE — 1090000001 HH PPS REVENUE CREDIT

## 2024-05-20 PROCEDURE — 1090000002 HH PPS REVENUE DEBIT

## 2024-05-21 ENCOUNTER — HOME CARE VISIT (OUTPATIENT)
Dept: HOME HEALTH SERVICES | Facility: HOME HEALTH | Age: 69
End: 2024-05-21
Payer: MEDICARE

## 2024-05-21 PROCEDURE — 1090000001 HH PPS REVENUE CREDIT

## 2024-05-21 PROCEDURE — 1090000002 HH PPS REVENUE DEBIT

## 2024-05-22 ENCOUNTER — HOME CARE VISIT (OUTPATIENT)
Dept: HOME HEALTH SERVICES | Facility: HOME HEALTH | Age: 69
End: 2024-05-22
Payer: MEDICARE

## 2024-05-22 PROCEDURE — 1090000001 HH PPS REVENUE CREDIT

## 2024-05-22 PROCEDURE — G0152 HHCP-SERV OF OT,EA 15 MIN: HCPCS | Mod: HHH

## 2024-05-22 PROCEDURE — 1090000002 HH PPS REVENUE DEBIT

## 2024-05-22 ASSESSMENT — ENCOUNTER SYMPTOMS
PAIN LOCATION: BACK
PERSON REPORTING PAIN: PATIENT
PAIN LOCATION: LEFT SHOULDER
SUBJECTIVE PAIN PROGRESSION: UNCHANGED
PAIN: 1

## 2024-05-22 ASSESSMENT — PAIN SCALES - PAIN ASSESSMENT IN ADVANCED DEMENTIA (PAINAD)
BREATHING: 0
BODYLANGUAGE: 0
NEGVOCALIZATION: 0
CONSOLABILITY: 0
NEGVOCALIZATION: 0 - NONE.
FACIALEXPRESSION: 0 - SMILING OR INEXPRESSIVE.
CONSOLABILITY: 0 - NO NEED TO CONSOLE.
FACIALEXPRESSION: 0
TOTALSCORE: 0
BODYLANGUAGE: 0 - RELAXED.

## 2024-05-23 ENCOUNTER — HOME CARE VISIT (OUTPATIENT)
Dept: HOME HEALTH SERVICES | Facility: HOME HEALTH | Age: 69
End: 2024-05-23
Payer: MEDICARE

## 2024-05-23 PROCEDURE — 1090000001 HH PPS REVENUE CREDIT

## 2024-05-23 PROCEDURE — 1090000002 HH PPS REVENUE DEBIT

## 2024-05-23 PROCEDURE — G0152 HHCP-SERV OF OT,EA 15 MIN: HCPCS | Mod: HHH

## 2024-05-23 ASSESSMENT — ENCOUNTER SYMPTOMS
HIGHEST PAIN SEVERITY IN PAST 24 HOURS: 7/10
PAIN LOCATION - PAIN SEVERITY: 7/10
PAIN LOCATION: BACK
PAIN LOCATION - PAIN FREQUENCY: CONSTANT
PAIN LOCATION - PAIN QUALITY: STABBING
SUBJECTIVE PAIN PROGRESSION: UNCHANGED
PAIN SEVERITY GOAL: 4/10
LOWEST PAIN SEVERITY IN PAST 24 HOURS: 7/10

## 2024-05-24 PROCEDURE — 1090000001 HH PPS REVENUE CREDIT

## 2024-05-24 PROCEDURE — 1090000002 HH PPS REVENUE DEBIT

## 2024-05-25 PROCEDURE — 1090000002 HH PPS REVENUE DEBIT

## 2024-05-25 PROCEDURE — 1090000001 HH PPS REVENUE CREDIT

## 2024-05-26 PROCEDURE — 1090000001 HH PPS REVENUE CREDIT

## 2024-05-26 PROCEDURE — 1090000002 HH PPS REVENUE DEBIT

## 2024-05-27 PROCEDURE — 1090000002 HH PPS REVENUE DEBIT

## 2024-05-27 PROCEDURE — 1090000001 HH PPS REVENUE CREDIT

## 2024-05-28 PROCEDURE — 1090000002 HH PPS REVENUE DEBIT

## 2024-05-28 PROCEDURE — 1090000001 HH PPS REVENUE CREDIT

## 2024-05-29 ENCOUNTER — HOME CARE VISIT (OUTPATIENT)
Dept: HOME HEALTH SERVICES | Facility: HOME HEALTH | Age: 69
End: 2024-05-29
Payer: MEDICARE

## 2024-05-29 PROCEDURE — 1090000002 HH PPS REVENUE DEBIT

## 2024-05-29 PROCEDURE — 1090000001 HH PPS REVENUE CREDIT

## 2024-05-29 PROCEDURE — G0152 HHCP-SERV OF OT,EA 15 MIN: HCPCS | Mod: HHH

## 2024-05-29 ASSESSMENT — ENCOUNTER SYMPTOMS
HIGHEST PAIN SEVERITY IN PAST 24 HOURS: 8/10
PAIN: 1
LOWEST PAIN SEVERITY IN PAST 24 HOURS: 8/10
SUBJECTIVE PAIN PROGRESSION: UNCHANGED
PERSON REPORTING PAIN: PATIENT
PAIN LOCATION - PAIN FREQUENCY: CONSTANT
PAIN LOCATION: BACK
PAIN LOCATION - PAIN QUALITY: BURNING
PAIN LOCATION - PAIN SEVERITY: 8/10
PAIN SEVERITY GOAL: 6/10

## 2024-05-29 ASSESSMENT — PAIN SCALES - PAIN ASSESSMENT IN ADVANCED DEMENTIA (PAINAD)
BREATHING: 0
TOTALSCORE: 0
FACIALEXPRESSION: 0
NEGVOCALIZATION: 0
CONSOLABILITY: 0 - NO NEED TO CONSOLE.
BODYLANGUAGE: 0
FACIALEXPRESSION: 0 - SMILING OR INEXPRESSIVE.
CONSOLABILITY: 0
NEGVOCALIZATION: 0 - NONE.
BODYLANGUAGE: 0 - RELAXED.

## 2024-05-30 PROCEDURE — 1090000002 HH PPS REVENUE DEBIT

## 2024-05-30 PROCEDURE — 1090000001 HH PPS REVENUE CREDIT

## 2024-05-31 ENCOUNTER — HOME CARE VISIT (OUTPATIENT)
Dept: HOME HEALTH SERVICES | Facility: HOME HEALTH | Age: 69
End: 2024-05-31
Payer: MEDICARE

## 2024-05-31 PROCEDURE — 1090000002 HH PPS REVENUE DEBIT

## 2024-05-31 PROCEDURE — 1090000001 HH PPS REVENUE CREDIT

## 2024-05-31 PROCEDURE — G0152 HHCP-SERV OF OT,EA 15 MIN: HCPCS | Mod: HHH

## 2024-05-31 ASSESSMENT — PAIN SCALES - PAIN ASSESSMENT IN ADVANCED DEMENTIA (PAINAD)
CONSOLABILITY: 0 - NO NEED TO CONSOLE.
NEGVOCALIZATION: 0 - NONE.
FACIALEXPRESSION: 0 - SMILING OR INEXPRESSIVE.
BREATHING: 0
BODYLANGUAGE: 0 - RELAXED.
BODYLANGUAGE: 0
NEGVOCALIZATION: 0
CONSOLABILITY: 0
FACIALEXPRESSION: 0
TOTALSCORE: 0

## 2024-05-31 ASSESSMENT — ENCOUNTER SYMPTOMS
PAIN LOCATION: BACK
HIGHEST PAIN SEVERITY IN PAST 24 HOURS: 5/10
PERSON REPORTING PAIN: PATIENT
PAIN SEVERITY GOAL: 5/10
LOWEST PAIN SEVERITY IN PAST 24 HOURS: 5/10
SUBJECTIVE PAIN PROGRESSION: UNCHANGED
PAIN: 1

## 2024-06-01 PROCEDURE — 1090000002 HH PPS REVENUE DEBIT

## 2024-06-01 PROCEDURE — 1090000001 HH PPS REVENUE CREDIT

## 2024-06-02 PROCEDURE — 1090000001 HH PPS REVENUE CREDIT

## 2024-06-02 PROCEDURE — 1090000002 HH PPS REVENUE DEBIT

## 2024-06-03 ENCOUNTER — HOME CARE VISIT (OUTPATIENT)
Dept: HOME HEALTH SERVICES | Facility: HOME HEALTH | Age: 69
End: 2024-06-03
Payer: MEDICARE

## 2024-06-03 PROCEDURE — 1090000001 HH PPS REVENUE CREDIT

## 2024-06-03 PROCEDURE — 1090000002 HH PPS REVENUE DEBIT

## 2024-06-03 PROCEDURE — G0152 HHCP-SERV OF OT,EA 15 MIN: HCPCS | Mod: HHH

## 2024-06-03 ASSESSMENT — PAIN SCALES - PAIN ASSESSMENT IN ADVANCED DEMENTIA (PAINAD)
TOTALSCORE: 0
CONSOLABILITY: 0 - NO NEED TO CONSOLE.
NEGVOCALIZATION: 0
NEGVOCALIZATION: 0 - NONE.
BREATHING: 0
BODYLANGUAGE: 0
FACIALEXPRESSION: 0
FACIALEXPRESSION: 0 - SMILING OR INEXPRESSIVE.
BODYLANGUAGE: 0 - RELAXED.
CONSOLABILITY: 0

## 2024-06-03 ASSESSMENT — ENCOUNTER SYMPTOMS
SUBJECTIVE PAIN PROGRESSION: UNCHANGED
LOWEST PAIN SEVERITY IN PAST 24 HOURS: 4/10
PAIN: 1
PAIN LOCATION: BACK
PERSON REPORTING PAIN: PATIENT
HIGHEST PAIN SEVERITY IN PAST 24 HOURS: 4/10
PAIN SEVERITY GOAL: 4/10

## 2024-06-04 PROCEDURE — 1090000001 HH PPS REVENUE CREDIT

## 2024-06-04 PROCEDURE — 1090000002 HH PPS REVENUE DEBIT

## 2024-06-05 ENCOUNTER — HOME CARE VISIT (OUTPATIENT)
Dept: HOME HEALTH SERVICES | Facility: HOME HEALTH | Age: 69
End: 2024-06-05
Payer: MEDICARE

## 2024-06-05 PROCEDURE — 1090000001 HH PPS REVENUE CREDIT

## 2024-06-05 PROCEDURE — G0152 HHCP-SERV OF OT,EA 15 MIN: HCPCS | Mod: HHH

## 2024-06-05 PROCEDURE — 1090000002 HH PPS REVENUE DEBIT

## 2024-06-05 ASSESSMENT — PAIN SCALES - PAIN ASSESSMENT IN ADVANCED DEMENTIA (PAINAD)
BODYLANGUAGE: 0 - RELAXED.
NEGVOCALIZATION: 0
CONSOLABILITY: 0
CONSOLABILITY: 0 - NO NEED TO CONSOLE.
TOTALSCORE: 0
FACIALEXPRESSION: 0
BODYLANGUAGE: 0
FACIALEXPRESSION: 0 - SMILING OR INEXPRESSIVE.
NEGVOCALIZATION: 0 - NONE.
BREATHING: 0

## 2024-06-05 ASSESSMENT — ENCOUNTER SYMPTOMS
PERSON REPORTING PAIN: PATIENT
HIGHEST PAIN SEVERITY IN PAST 24 HOURS: 5/10
PAIN SEVERITY GOAL: 5/10
LOWEST PAIN SEVERITY IN PAST 24 HOURS: 5/10
SUBJECTIVE PAIN PROGRESSION: UNCHANGED
PAIN LOCATION: BACK
PAIN: 1

## 2024-06-05 ASSESSMENT — ACTIVITIES OF DAILY LIVING (ADL)
OASIS_M1830: 00
HOME_HEALTH_OASIS: 00

## 2024-06-20 ENCOUNTER — OFFICE VISIT (OUTPATIENT)
Dept: PRIMARY CARE | Facility: CLINIC | Age: 69
End: 2024-06-20
Payer: MEDICARE

## 2024-06-20 VITALS
HEART RATE: 49 BPM | OXYGEN SATURATION: 95 % | TEMPERATURE: 97.1 F | SYSTOLIC BLOOD PRESSURE: 140 MMHG | WEIGHT: 136 LBS | DIASTOLIC BLOOD PRESSURE: 82 MMHG | HEIGHT: 70 IN | BODY MASS INDEX: 19.47 KG/M2

## 2024-06-20 DIAGNOSIS — R10.32 LEFT GROIN PAIN: Primary | ICD-10-CM

## 2024-06-20 DIAGNOSIS — K41.31: ICD-10-CM

## 2024-06-20 PROBLEM — G25.5 CHOREA: Status: ACTIVE | Noted: 2024-06-20

## 2024-06-20 PROCEDURE — 1159F MED LIST DOCD IN RCRD: CPT | Performed by: INTERNAL MEDICINE

## 2024-06-20 PROCEDURE — 99214 OFFICE O/P EST MOD 30 MIN: CPT | Performed by: INTERNAL MEDICINE

## 2024-06-20 PROCEDURE — 1125F AMNT PAIN NOTED PAIN PRSNT: CPT | Performed by: INTERNAL MEDICINE

## 2024-06-20 ASSESSMENT — ENCOUNTER SYMPTOMS
NAUSEA: 0
CONSTIPATION: 1
ABDOMINAL PAIN: 1
ANOREXIA: 0
VOMITING: 0
SORE THROAT: 0
FREQUENCY: 0
HEADACHES: 0
FLANK PAIN: 0
DIARRHEA: 0
DYSURIA: 0
COUGH: 0
CHILLS: 0
SHORTNESS OF BREATH: 0
FEVER: 0

## 2024-06-20 ASSESSMENT — PATIENT HEALTH QUESTIONNAIRE - PHQ9
SUM OF ALL RESPONSES TO PHQ9 QUESTIONS 1 AND 2: 0
1. LITTLE INTEREST OR PLEASURE IN DOING THINGS: NOT AT ALL
2. FEELING DOWN, DEPRESSED OR HOPELESS: NOT AT ALL

## 2024-06-20 ASSESSMENT — PAIN SCALES - GENERAL: PAINLEVEL: 6

## 2024-06-20 NOTE — PROGRESS NOTES
[facility]: [facility]  PROGRESS NOTE      Davi Garsia is a 68 y.o. male that is presenting today for Follow-up.    Assessment/Plan   Diagnoses and all orders for this visit:  Left groin pain  Due to intermittently symptomatic Lt. Femoral hernia  Recurrent irreducible left femoral hernia     Not obstructed or gangrenous   -     Referral to General Surgery; Future     Instructed the patient to got to the ED with any F/C - N/V , Persistent pain or in crease in pain severity  Subjective     - Davi Garsia 68 y.o. male is here today for Left groin pain        - Patient denies any other symptoms or concerns at this time.       - patient denies any adverse reactions to or concerns with his/her meds.       - Problem list and medication reconciliation done today.  - V.S. Stable. No changes at this time.  - Encouraged continued diet and exercise modification.    Groin Pain  This is a new problem. The current episode started 1 to 4 weeks ago. The problem occurs 2 to 4 times per day. The problem has been gradually worsening. Associated symptoms include abdominal pain (LLQ) and constipation. Pertinent negatives include no anorexia, chest pain, chills, coughing, diarrhea, discolored urine, dysuria, fever, flank pain, frequency, headaches, hematuria, hesitancy, joint pain, joint swelling, nausea, painful intercourse, rash, shortness of breath, sore throat, urgency, urinary retention or vomiting. The symptoms are aggravated by heavy lifting and bowel movements (sitting). He has tried nothing for the symptoms.     Review of Systems   Constitutional:  Negative for chills and fever.   HENT:  Negative for sore throat.    Respiratory:  Negative for cough and shortness of breath.    Cardiovascular:  Negative for chest pain.   Gastrointestinal:  Positive for abdominal pain (LLQ) and constipation. Negative for anorexia, diarrhea, nausea and vomiting.   Genitourinary:  Negative for dysuria, flank  pain, frequency, hesitancy and urgency.   Musculoskeletal:  Negative for joint pain.   Skin:  Negative for rash.   Neurological:  Negative for headaches.        All pertinent POSITIVES as noted per HPI.  All other systems have been reviewed and are NEGATIVE and /or Noncontributory to this patient current visit or complaint.    Objective   Vitals:    06/20/24 0818   BP: 140/82   Pulse: (!) 49   Temp: 36.2 °C (97.1 °F)   SpO2: 95%      Body mass index is 19.51 kg/m².  Physical Exam  Vitals and nursing note reviewed.   Constitutional:       Appearance: Normal appearance.   HENT:      Head: Normocephalic and atraumatic.   Neck:      Vascular: No carotid bruit.   Cardiovascular:      Rate and Rhythm: Normal rate and regular rhythm.      Pulses: Normal pulses.      Heart sounds: Normal heart sounds.   Pulmonary:      Effort: Pulmonary effort is normal.      Breath sounds: Normal breath sounds.   Abdominal:      General: Abdomen is flat. Bowel sounds are normal. There is no distension.      Palpations: Abdomen is soft. There is no mass.      Tenderness: There is abdominal tenderness. There is no right CVA tenderness, left CVA tenderness, guarding or rebound.      Hernia: A hernia is present.       Musculoskeletal:         General: No swelling. Normal range of motion.      Cervical back: Neck supple.   Lymphadenopathy:      Cervical: No cervical adenopathy.   Skin:     General: Skin is warm and dry.   Neurological:      Mental Status: He is alert.   Psychiatric:         Mood and Affect: Mood normal.   Diagnostic Results   Lab Results   Component Value Date    GLUCOSE 110 (H) 05/02/2024    CALCIUM 9.4 05/02/2024     05/02/2024    K 3.3 (L) 05/02/2024    CO2 23 (L) 05/02/2024     05/02/2024    BUN 12 05/02/2024    CREATININE 0.80 05/02/2024     Lab Results   Component Value Date    ALT 31 05/02/2024    AST 21 05/02/2024    ALKPHOS 197 (H) 05/02/2024    BILITOT 0.4 05/02/2024     Lab Results   Component Value Date  "   WBC 7.3 05/02/2024    HGB 13.5 05/02/2024    HCT 40.2 (L) 05/02/2024    MCV 90 05/02/2024     05/02/2024     Lab Results   Component Value Date    CHOL 114 (L) 04/22/2024    CHOL 159 06/26/2023    CHOL 144 08/22/2022     Lab Results   Component Value Date    HDL 38.0 (L) 04/22/2024    HDL 66 06/26/2023    HDL 57 08/22/2022     Lab Results   Component Value Date    LDLCALC 62 (L) 04/22/2024    LDLCALC 77 06/26/2023    LDLCALC 76 08/22/2022     Lab Results   Component Value Date    TRIG 70 04/22/2024    TRIG 80 06/26/2023    TRIG 57 08/22/2022     No components found for: \"CHOLHDL\"  Lab Results   Component Value Date    HGBA1C 5.4 03/15/2024     Other labs not included in the list above were reviewed either before or during this encounter.    History    Past Medical History:   Diagnosis Date    Arthritis     BPH with elevated PSA     Current every day smoker     Depression     Fibromyalgia     GERD (gastroesophageal reflux disease)     History of peritonitis     Hyperlipidemia     Insomnia     Movement disorder     Rule out Lamb's chorea versus Parkinson's    Osteoporosis     Postlaminectomy syndrome     TIA (transient ischemic attack) 2005     Past Surgical History:   Procedure Laterality Date    BACK SURGERY      X 3    CHOLECYSTECTOMY  2022    COLONOSCOPY W/ BIOPSIES      ESOPHAGOGASTRODUODENOSCOPY      MEDIPORT INSERTION, SINGLE      Has not been flushed or used in 14 years    PENILE PROSTHESIS IMPLANT      SHOULDER ARTHROSCOPY Bilateral     SPINAL CORD STIMULATOR IMPLANT      X 2.  Removal for infection both times    TOTAL KNEE ARTHROPLASTY Left      Family History   Problem Relation Name Age of Onset    Dementia Mother      Mental illness Mother      Heart attack Father      Diabetes Father      Other (brain tumor) Father      Heart disease Father      Cancer Maternal Grandfather      Leukemia Other      Pancreatic cancer Other       Social History     Socioeconomic History    Marital status: "      Spouse name: Not on file    Number of children: Not on file    Years of education: Not on file    Highest education level: Not on file   Occupational History    Not on file   Tobacco Use    Smoking status: Every Day     Current packs/day: 0.50     Average packs/day: 0.5 packs/day for 52.5 years (26.2 ttl pk-yrs)     Types: Cigarettes     Start date: 1972     Passive exposure: Current    Smokeless tobacco: Never   Vaping Use    Vaping status: Never Used   Substance and Sexual Activity    Alcohol use: Never    Drug use: Never    Sexual activity: Not on file   Other Topics Concern    Not on file   Social History Narrative    Not on file     Social Determinants of Health     Financial Resource Strain: Low Risk  (4/5/2024)    Overall Financial Resource Strain (CARDIA)     Difficulty of Paying Living Expenses: Not hard at all   Food Insecurity: Not on file   Transportation Needs: No Transportation Needs (6/5/2024)    OASIS : Transportation     Lack of Transportation (Medical): No     Lack of Transportation (Non-Medical): No     Patient Unable or Declines to Respond: No   Physical Activity: Not on file   Stress: Not on file   Social Connections: Feeling Socially Integrated (6/5/2024)    OASIS : Social Isolation     Frequency of experiencing loneliness or isolation: Never   Intimate Partner Violence: Not on file   Housing Stability: Low Risk  (4/5/2024)    Housing Stability Vital Sign     Unable to Pay for Housing in the Last Year: No     Number of Places Lived in the Last Year: 1     Unstable Housing in the Last Year: No     Allergies   Allergen Reactions    Adhesive Tape-Silicones Unknown     Reactions: Blistering    Cephalexin Unknown    Clindamycin Unknown    Linezolid Unknown    Adhesive Rash     water blisters    Penicillins Rash and Swelling     chest swelling     Current Outpatient Medications on File Prior to Visit   Medication Sig Dispense Refill    amantadine (Symmetrel) 100 mg capsule Take  1 capsule (100 mg) by mouth 2 times a day.      amitriptyline (Elavil) 10 mg tablet Take 1 tablet (10 mg) by mouth once daily at bedtime. 100 tablet 1    amitriptyline (Elavil) 10 mg tablet Take 1 mg by mouth once daily at bedtime. Indications: disorder characterized by stiff, tender & painful muscles      atorvastatin (Lipitor) 20 mg tablet Take 1 tablet (20 mg) by mouth once daily at bedtime. 100 tablet 1    atorvastatin (Lipitor) 20 mg tablet Take 1 tablet (20 mg) by mouth once daily.      escitalopram (Lexapro) 5 mg tablet Take 1 tablet (5 mg) by mouth once daily. 100 tablet 1    escitalopram oxalate (LEXAPRO ORAL) Take 5 mg/oz by mouth once daily. Indications: depression      gabapentin (Neurontin) 400 mg capsule Take 2 capsules (800 mg) by mouth 4 times a day. 400 capsule 1    HYDROmorphone (Dilaudid) 2 mg tablet Take 1 tablet (2 mg) by mouth every 4 hours if needed for severe pain (7 - 10). 25 tablet 0    ibuprofen 200 mg tablet Take 1 tablet (200 mg) by mouth every 6 hours if needed for mild pain (1 - 3).      Pump Patient Supplied Medication Inject under the skin continuously. Patient Own Pump    Meds: morphine and lidocaine  Provider name/ #:   Pump name: [employer]  Last & next fill date: next fill date is the 1st week in April      topiramate (Topamax) 100 mg tablet Take 1 tablet (100 mg) by mouth 2 times a day. 180 tablet 1    traZODone (Desyrel) 150 mg tablet Take 1 tablet (150 mg) by mouth as needed at bedtime for sleep. 100 tablet 1    traZODone (Desyrel) 50 mg tablet Take 3 tablets (150 mg) by mouth once daily at bedtime.      amantadine (Symmetrel) 100 mg capsule Take 1 capsule (100 mg) by mouth once daily. Do not start before April 7, 2024. 30 capsule 0     No current facility-administered medications on file prior to visit.     Immunization History   Administered Date(s) Administered    Covaxin Sars-cov-2 Vaccination 12/09/2023    Flu vaccine, quadrivalent, high-dose, preservative free, age 65y+  (FLUZONE) 12/17/2020, 10/04/2021, 10/25/2022, 10/25/2023    Influenza, High Dose Seasonal, Preservative Free 10/01/2019, 09/07/2020    Influenza, Unspecified 10/01/2001, 10/01/2009    Influenza, injectable, quadrivalent 10/07/2016    Influenza, seasonal, injectable 09/29/2012, 10/24/2013, 10/09/2015, 09/19/2018    Novel influenza-H1N1-09, preservative-free 01/04/2010    Pfizer COVID-19 vaccine, bivalent, age 12 years and older (30 mcg/0.3 mL) 10/25/2022    Pfizer Purple Cap SARS-CoV-2 03/24/2021, 04/23/2021, 11/11/2021    Pneumococcal conjugate vaccine, 13-valent (PREVNAR 13) 10/07/2016    Pneumococcal conjugate vaccine, 20-valent (PREVNAR 20) 02/15/2023    Pneumococcal polysaccharide vaccine, 23-valent, age 2 years and older (PNEUMOVAX 23) 01/01/2008, 03/24/2010, 09/04/2018, 07/17/2020    RSV, 60 Years And Older (AREXVY) 12/09/2023    Td vaccine, age 7 years and older (TENIVAC) 07/23/1996    Tdap vaccine, age 7 year and older (BOOSTRIX, ADACEL) 10/01/2019     Patient's medical history was reviewed and updated either before or during this encounter.       Alcira Wooten MD

## 2024-06-27 ENCOUNTER — APPOINTMENT (OUTPATIENT)
Dept: PRIMARY CARE | Facility: CLINIC | Age: 69
End: 2024-06-27
Payer: MEDICARE

## 2024-06-27 ENCOUNTER — OFFICE VISIT (OUTPATIENT)
Dept: SURGERY | Facility: CLINIC | Age: 69
End: 2024-06-27
Payer: MEDICARE

## 2024-06-27 VITALS
TEMPERATURE: 98.1 F | DIASTOLIC BLOOD PRESSURE: 62 MMHG | WEIGHT: 135 LBS | SYSTOLIC BLOOD PRESSURE: 102 MMHG | BODY MASS INDEX: 20.46 KG/M2 | HEIGHT: 68 IN

## 2024-06-27 DIAGNOSIS — K41.31: ICD-10-CM

## 2024-06-27 DIAGNOSIS — R10.32 LEFT GROIN PAIN: ICD-10-CM

## 2024-06-27 DIAGNOSIS — K40.20 NON-RECURRENT BILATERAL INGUINAL HERNIA WITHOUT OBSTRUCTION OR GANGRENE: Primary | ICD-10-CM

## 2024-06-27 PROCEDURE — 99205 OFFICE O/P NEW HI 60 MIN: CPT | Performed by: STUDENT IN AN ORGANIZED HEALTH CARE EDUCATION/TRAINING PROGRAM

## 2024-06-27 PROCEDURE — 1125F AMNT PAIN NOTED PAIN PRSNT: CPT | Performed by: STUDENT IN AN ORGANIZED HEALTH CARE EDUCATION/TRAINING PROGRAM

## 2024-06-27 PROCEDURE — 99215 OFFICE O/P EST HI 40 MIN: CPT | Performed by: STUDENT IN AN ORGANIZED HEALTH CARE EDUCATION/TRAINING PROGRAM

## 2024-06-27 PROCEDURE — 1159F MED LIST DOCD IN RCRD: CPT | Performed by: STUDENT IN AN ORGANIZED HEALTH CARE EDUCATION/TRAINING PROGRAM

## 2024-06-27 ASSESSMENT — PATIENT HEALTH QUESTIONNAIRE - PHQ9
1. LITTLE INTEREST OR PLEASURE IN DOING THINGS: NOT AT ALL
2. FEELING DOWN, DEPRESSED OR HOPELESS: NOT AT ALL
SUM OF ALL RESPONSES TO PHQ9 QUESTIONS 1 AND 2: 0

## 2024-06-27 ASSESSMENT — ENCOUNTER SYMPTOMS
DIARRHEA: 0
ABDOMINAL PAIN: 1
ADENOPATHY: 0
WOUND: 0
FEVER: 0
BLOOD IN STOOL: 0
HEADACHES: 0
BRUISES/BLEEDS EASILY: 0
PALPITATIONS: 0
DEPRESSION: 0
ARTHRALGIAS: 0
LOSS OF SENSATION IN FEET: 0
DYSURIA: 0
FACIAL ASYMMETRY: 0
SORE THROAT: 0
VOICE CHANGE: 0
OCCASIONAL FEELINGS OF UNSTEADINESS: 0
CHILLS: 0
SPEECH DIFFICULTY: 0
SHORTNESS OF BREATH: 0
NAUSEA: 0
HEMATURIA: 0
UNEXPECTED WEIGHT CHANGE: 1
TROUBLE SWALLOWING: 0
VOMITING: 0
CHEST TIGHTNESS: 0

## 2024-06-27 ASSESSMENT — PAIN SCALES - GENERAL: PAINLEVEL: 2

## 2024-06-27 NOTE — PROGRESS NOTES
History Of Present Illness  Davi Sol is a 68 y.o. male presenting For evaluation of bilateral inguinal hernia.  He reports he is known he has right inguinal hernia for many years, however now he thinks he also has a left-sided 1.  He reports about a month ago he noticed severe pain in his left groin.  He was not doing any heavy lifting or anything at that time.  Since then he notices an occasional bulge in his left groin which gets worse as the day progresses.  He currently does not feel like there is a bulge there.  Sometimes it is difficult to have a bowel movement because he feels like he cannot strain because of the bulge.  Of note he does have a penile prosthesis pump which was placed over 10 years ago.  Also has a pain pump in his central abdomen.  had a colonoscopy in April 2023 which had 1 polyp removal     Past Medical History  He has a past medical history of Arthritis, BPH with elevated PSA, Current every day smoker, Depression, Fibromyalgia, GERD (gastroesophageal reflux disease), History of peritonitis, Hyperlipidemia, Insomnia, Movement disorder, Osteoporosis, Postlaminectomy syndrome, and TIA (transient ischemic attack) (2005).    Surgical History  He has a past surgical history that includes Back surgery; Penile prosthesis implant; Colonoscopy w/ biopsies; Total knee arthroplasty (Left); Mediport insertion, single; Spinal cord stimulator implant; Shoulder arthroscopy (Bilateral); Cholecystectomy (2022); and Esophagogastroduodenoscopy.     Social History  He reports that he has been smoking cigarettes. He started smoking about 52 years ago. He has a 26.2 pack-year smoking history. He has been exposed to tobacco smoke. He has never used smokeless tobacco. He reports that he does not drink alcohol and does not use drugs.    Family History  Family History   Problem Relation Name Age of Onset    Dementia Mother      Mental illness Mother      Heart attack Father      Diabetes Father      Other  (brain tumor) Father      Heart disease Father      Cancer Maternal Grandfather      Leukemia Other      Pancreatic cancer Other          Allergies  Adhesive tape-silicones, Cephalexin, Clindamycin, Linezolid, Adhesive, and Penicillins    Review of Systems   Constitutional:  Positive for unexpected weight change. Negative for chills and fever.   HENT:  Negative for sneezing, sore throat, trouble swallowing and voice change.    Respiratory:  Negative for chest tightness and shortness of breath.    Cardiovascular:  Negative for chest pain and palpitations.   Gastrointestinal:  Positive for abdominal pain. Negative for blood in stool, diarrhea, nausea and vomiting.   Endocrine: Negative for cold intolerance and heat intolerance.   Genitourinary:  Negative for decreased urine volume, dysuria and hematuria.   Musculoskeletal:  Negative for arthralgias and gait problem.   Skin:  Negative for rash and wound.   Neurological:  Negative for facial asymmetry, speech difficulty and headaches.   Hematological:  Negative for adenopathy. Does not bruise/bleed easily.   Psychiatric/Behavioral:  Negative for self-injury and suicidal ideas.         Physical Exam  Vitals and nursing note reviewed.   Constitutional:       Appearance: Normal appearance.   HENT:      Head: Normocephalic and atraumatic.      Mouth/Throat:      Mouth: Mucous membranes are moist.      Pharynx: Oropharynx is clear.   Eyes:      Extraocular Movements: Extraocular movements intact.      Pupils: Pupils are equal, round, and reactive to light.   Cardiovascular:      Rate and Rhythm: Normal rate and regular rhythm.      Pulses: Normal pulses.   Pulmonary:      Effort: Pulmonary effort is normal.      Breath sounds: Normal breath sounds.   Abdominal:      General: There is no distension.      Palpations: Abdomen is soft.      Tenderness: There is no abdominal tenderness.      Comments:  penile pump prosthesis palpated in the right groin and pain pump in the  "central lower abdomen     hernia defect palpated around the pump in the right inguinal canal.  Small hernia defect in the left inguinal canal appreciated on Valsalva.   Musculoskeletal:      Cervical back: Normal range of motion and neck supple.   Skin:     General: Skin is warm and dry.   Neurological:      General: No focal deficit present.      Mental Status: He is alert and oriented to person, place, and time.   Psychiatric:         Mood and Affect: Mood normal.         Behavior: Behavior normal.        Last Recorded Vitals  Blood pressure 102/62, temperature 36.7 °C (98.1 °F), height 1.727 m (5' 8\"), weight 61.2 kg (135 lb).    Relevant Results    Reviewed colonoscopy reports from 2023, 1 small splenic flexure polyp removed     multiple CT scans reviewed dating back to 2003.  Current evidence of bilateral inguinal hernia with extensive hardware from penile pump and pain pump in the subcutaneous tissues of the lower abdomen     Assessment/Plan   Problem List Items Addressed This Visit    None  Visit Diagnoses         Codes    Non-recurrent bilateral inguinal hernia without obstruction or gangrene    -  Primary K40.20    Left groin pain     R10.32    Recurrent irreducible left femoral hernia     K41.31          68yoM  presents for evaluation of bilateral inguinal hernias.  The right one he has had for many years and is relatively asymptomatic, however the left one was just identified about a month ago and is more painful.  We discussed the etiology of inguinal hernia which especially on the right may be contributory secondary to his prosthetic pump.  Described the potential repairs for inguinal hernia including open and minimally invasive approach.  Given that #1 he has bilateral inguinal hernia #2 he has extensive hardware which appears to be all in the soft subcutaneous tissues of the lower abdomen and groin, I have recommended a minimally invasive/posterior approach to the hernia repair.  I described the " procedure in detail including the use of mesh placement which is important for reducing the recurrence rate for the hernia after repair.  Did discuss the potential risks of surgery including infection, groin pain and numbness to the anterior thigh. Also possible injury to the penile prosthesis.  Given the multiple surgeries he has had surrounding the prostatic pump, I do want to discuss this case with urology and confirm the subcutaneous location of the hardware.  I will call the patient back once I have solidified a surgical plan and we will schedule at that time.  He is here today with his wife.  All questions answered.      Emily Salmon MD

## 2024-07-05 ENCOUNTER — TELEPHONE (OUTPATIENT)
Dept: SURGERY | Facility: CLINIC | Age: 69
End: 2024-07-05
Payer: MEDICARE

## 2024-07-05 NOTE — TELEPHONE ENCOUNTER
Patient's wife, Nirmala, and stated patient is in pain left groin area. Patient's wife stated Dr. Salmon was going to reach out to doctor who did penile implant so he could attend appointment. She would like to know status. Nirmala can be reached at [phone].

## 2024-07-08 NOTE — TELEPHONE ENCOUNTER
Called pt's wife, Nirmala, and left a VM letting her know to call me back.  Dr. Salmon has not yet spoken to the other physician.

## 2024-07-09 DIAGNOSIS — K40.20 NON-RECURRENT BILATERAL INGUINAL HERNIA WITHOUT OBSTRUCTION OR GANGRENE: Primary | ICD-10-CM

## 2024-07-11 ENCOUNTER — TELEPHONE (OUTPATIENT)
Dept: SURGERY | Facility: CLINIC | Age: 69
End: 2024-07-11
Payer: MEDICARE

## 2024-07-11 PROBLEM — K40.20 NON-RECURRENT BILATERAL INGUINAL HERNIA WITHOUT OBSTRUCTION OR GANGRENE: Status: ACTIVE | Noted: 2024-07-09

## 2024-07-11 NOTE — TELEPHONE ENCOUNTER
Patient was seen in office 06/27/2024 with Dr. Salmon regarding hernia repair discussion. Per Dr. Salmon, patient to undergo Robotic repair of bilateral inguinal hernia.     Patient is scheduled for surgery at [facility] on Thursday 07/25/2024. Please contact patient and notify of surgery date and postop appt and that surgery itinerary (in Communications tab of this encounter) has been sent to patient's MyChart.

## 2024-07-11 NOTE — LETTER
July 11, 2024     Patient: Davi Sol   YOB: 1955           Thank you for scheduling surgery with Dr. Salmon   Below you will find your Surgery Itinerary to include dates, times, and locations for appointments involved with your procedure.      A representative from the hospital will contact you directly to schedule any Pre Admission Testing appointments needed.    On the day before the scheduled surgery, please call the Same Day Surgery department between 2-4 pm for a time of arrival for the day of procedure.  [facility] [phone]    Nothing to eat or drink after midnight the night before surgery    Surgery with Dr. Salmon at [facility] - [address] [place] [address]  On: Thursday 07/25/2024    Postoperative appointment is scheduled at [facility] Surgery office: [address] [facility] [address] [facility] [address] [address]          On: Monday 08/12/2024 @ 11:00 am    *Please note, you may receive a call from our financial counselors if you have a financial liability greater than $250.         [facility]  Pre - Operative Instructions     Your time of arrival for surgery is available the day before surgery. *If the surgery is on a Monday, or the Tuesday following a Monday Holiday, please call Same Day Surgery the Friday before your surgery date.*    DO NOT EAT OR DRINK ANYTHING AFTER MIDNIGHT THE NIGHT BEFORE SURGERY. This includes any beverages (coffee, water, soda, etc.), hard candy, gum or mints. If this is not followed, surgery may be canceled. Please avoid eating a large meal the evening before surgery. Please do not DRINK ALCOHOL or SMOKE FOR 24 HOURS before surgery.     Insulin Instructions - Please do not take any short acting Insulin (Regular or NPH). Do not take any oral diabetic medication on the day of surgery. Long acting Insulins may be taken (Lantus). We will check your blood sugar and administer at the hospital the day of surgery. Patient  who have Insulin pumps are to make NO adjustments.     Prescription Medications - You are encouraged to take prescription medications including heart, blood pressure, anti-seizure, anxiety, breathing medications (including inhalers) with exception to diabetic medications prior to arriving at the hospital the day of surgery. You may take prescribed pain medications as needed. Please remember the dose and time taken so we may inform your Anesthesiologist.     Please bring the name, dosage, and frequency of your medications if you did not provide these on the day of Pre Admission Testing. You may bring the actual bottles if this would be easier for you.     Please bring your prescribed inhalers with you the morning of surgery.     Patients on Anti-platelet and Anticoagulant agents, please read the following:  ASA, NSAIDS stop 5 days prior to surgery  Coumadin stop 5 days prior to surgery unless bridging therapy is needed (metal valve replacement, cardiac stent placement) - if so please speak to your Cardiologist or prescribing physician.   Other anti-platelet and anticoagulant agents:  Plavix (Clopidogrel) stop 5 days prior to surgery  Brilinta (Ticagrelor) stop 5 days prior to surgery   Effient (Prasugrel) stop 7 days prior to surgery   Lovenox (Enoxaparin) stop 24 hours prior to surgery  Arixtra (Fondaparinux) stop 5 days prior to surgery  Xarelto (Rivaroxaban) stop 3 days prior to surgery  Pradaxa (Dabigatran) stop 5 days prior to surgery  Eliquis (Apixaban) stop 3 days prior to surgery   Savaysa (Endoxaban) stop days prior to surgery     Please stop all herbal medications 2 weeks prior to surgery     C-PAP Devices - If you have a C-PAP device at home, bring it with you on our day of surgery if your surgery requires you to stay overnight.     Please bring a copy of any Advanced Directives the day of surgery if you did not provide it at Pre Admission Testing. These documents are living gama and durable power of   for healthcare.     Please notify your physician/surgeon if you develop a cold, sore throat, fever, flu symptoms, COVID symptoms or any changes in your physical conditions.     A shower or bath is preferred the evening before or the morning of surgery.     Remove jewelry before admission to the hospital. It is no longer permitted to tape rings. We ask that you leave all valuables at home. Any items of value will be given to a family member or locked up by security.   If you have a body piercing g that you cannot remove, it is recommended that you have it removed professionally and have a plastic spacer inserted. There is a risk for surgical burns with jewelry left in place.     Remove or wear minimal makeup the day of surgery. You will be asked to remove glasses and contact lenses prior to surgery. Please bring a glass case and/or contact lens case with you. These items are not provided.     Dentures and partials are usually removed prior to surgery. A denture cup will be provided for you.       You may be asked to remove nail polish. Acrylic nails are now acceptable.     Wear loose comfortable clothing that you will be able to fit over bandages when you leave the hospitals (as appropriate for your surgery).    Patients that are under the age of 18 years must have a parent or guardian present the day of surgery.     Family members of significant others may stay with you on the Same Day Surgery unit. While you are in surgery, they may wait for you in the family waiting area. The physician will speak to the waiting family, if permitted by the patient, after surgery.     Changes or delays in the surgery schedule may occur due to emergencies. The hospital will notify you if this occurs. We apologize for any inconveniences this may present.     You must have a responsible  available to drive you home after surgery. You will not be permitted to drive yourself home after surgery if you have received any  anesthesia or sedation during your procedure.     Please visit our website at [email or web address] for more information regarding [facility] services.      For questions about your Pre Admission Testing (PAT)  [facility] [phone]  [facility] [phone]    Thank you for choosing [facility]!

## 2024-07-15 ENCOUNTER — TELEPHONE (OUTPATIENT)
Dept: NEUROLOGY | Facility: CLINIC | Age: 69
End: 2024-07-15

## 2024-07-15 NOTE — TELEPHONE ENCOUNTER
Patient called stating pharmacy said the doctor had stop his Amantadine 100 mg, ready CS and it doesn't state that.

## 2024-07-16 DIAGNOSIS — E78.2 MIXED HYPERLIPIDEMIA: ICD-10-CM

## 2024-07-16 DIAGNOSIS — G25.9 MOVEMENT DISORDER: Primary | ICD-10-CM

## 2024-07-17 ENCOUNTER — PRE-ADMISSION TESTING (OUTPATIENT)
Dept: PREADMISSION TESTING | Facility: HOSPITAL | Age: 69
End: 2024-07-17
Payer: MEDICARE

## 2024-07-17 VITALS
SYSTOLIC BLOOD PRESSURE: 135 MMHG | OXYGEN SATURATION: 100 % | TEMPERATURE: 97.5 F | WEIGHT: 137.3 LBS | RESPIRATION RATE: 16 BRPM | HEIGHT: 68 IN | BODY MASS INDEX: 20.81 KG/M2 | HEART RATE: 63 BPM | DIASTOLIC BLOOD PRESSURE: 89 MMHG

## 2024-07-17 DIAGNOSIS — Z01.818 PREOP TESTING: Primary | ICD-10-CM

## 2024-07-17 PROCEDURE — 99203 OFFICE O/P NEW LOW 30 MIN: CPT | Performed by: NURSE PRACTITIONER

## 2024-07-17 PROCEDURE — 87081 CULTURE SCREEN ONLY: CPT | Mod: WESLAB

## 2024-07-17 RX ORDER — CHLORHEXIDINE GLUCONATE ORAL RINSE 1.2 MG/ML
SOLUTION DENTAL
Qty: 473 ML | Refills: 0 | Status: SHIPPED | OUTPATIENT
Start: 2024-07-17 | End: 2024-07-25

## 2024-07-17 RX ORDER — CHOLECALCIFEROL (VITAMIN D3) 25 MCG
1000 TABLET ORAL DAILY
COMMUNITY

## 2024-07-17 ASSESSMENT — ENCOUNTER SYMPTOMS
RESPIRATORY NEGATIVE: 1
NEUROLOGICAL NEGATIVE: 1
PSYCHIATRIC NEGATIVE: 1
EYES NEGATIVE: 1
CONSTIPATION: 1
CONSTITUTIONAL NEGATIVE: 1
CARDIOVASCULAR NEGATIVE: 1
ENDOCRINE NEGATIVE: 1
HEMATOLOGIC/LYMPHATIC NEGATIVE: 1
BACK PAIN: 1

## 2024-07-17 ASSESSMENT — DUKE ACTIVITY SCORE INDEX (DASI)
CAN YOU DO LIGHT WORK AROUND THE HOUSE LIKE DUSTING OR WASHING DISHES: YES
DASI METS SCORE: 5.7
CAN YOU DO YARD WORK LIKE RAKING LEAVES, WEEDING OR PUSHING A MOWER: NO
CAN YOU TAKE CARE OF YOURSELF (EAT, DRESS, BATHE, OR USE TOILET): YES
CAN YOU DO HEAVY WORK AROUND THE HOUSE LIKE SCRUBBING FLOORS OR LIFTING AND MOVING HEAVY FURNITURE: NO
CAN YOU WALK A BLOCK OR TWO ON LEVEL GROUND: YES
CAN YOU WALK INDOORS, SUCH AS AROUND YOUR HOUSE: YES
TOTAL_SCORE: 24.2
CAN YOU PARTICIPATE IN STRENOUS SPORTS LIKE SWIMMING, SINGLES TENNIS, FOOTBALL, BASKETBALL, OR SKIING: NO
CAN YOU CLIMB A FLIGHT OF STAIRS OR WALK UP A HILL: YES
CAN YOU PARTICIPATE IN MODERATE RECREATIONAL ACTIVITIES LIKE GOLF, BOWLING, DANCING, DOUBLES TENNIS OR THROWING A BASEBALL OR FOOTBALL: NO
CAN YOU HAVE SEXUAL RELATIONS: YES
CAN YOU DO MODERATE WORK AROUND THE HOUSE LIKE VACUUMING, SWEEPING FLOORS OR CARRYING GROCERIES: YES
CAN YOU RUN A SHORT DISTANCE: NO

## 2024-07-17 ASSESSMENT — PAIN DESCRIPTION - DESCRIPTORS: DESCRIPTORS: ACHING;CRAMPING

## 2024-07-17 ASSESSMENT — PAIN SCALES - GENERAL: PAINLEVEL_OUTOF10: 2

## 2024-07-17 ASSESSMENT — PAIN - FUNCTIONAL ASSESSMENT: PAIN_FUNCTIONAL_ASSESSMENT: 0-10

## 2024-07-17 NOTE — PREPROCEDURE INSTRUCTIONS
Medication List            Accurate as of July 17, 2024  4:19 PM. Always use your most recent med list.                amantadine 100 mg capsule  Commonly known as: Symmetrel  Take 1 capsule (100 mg) by mouth once daily. Do not start before April 7, 2024.  Medication Adjustments for Surgery: Take morning of surgery with sip of water, no other fluids     * amitriptyline 10 mg tablet  Commonly known as: Elavil  Take 1 tablet (10 mg) by mouth once daily at bedtime.  Medication Adjustments for Surgery: Continue until night before surgery     * amitriptyline 10 mg tablet  Commonly known as: Elavil  DUPLICATE ENTRY     * atorvastatin 20 mg tablet  Commonly known as: Lipitor  Take 1 tablet (20 mg) by mouth once daily at bedtime.  Medication Adjustments for Surgery: Continue until night before surgery     * atorvastatin 20 mg tablet  Commonly known as: Lipitor DUPLICATE ENTRY     escitalopram 5 mg tablet  Commonly known as: Lexapro  Take 1 tablet (5 mg) by mouth once daily.  Medication Adjustments for Surgery: Continue until night before surgery     gabapentin 400 mg capsule  Commonly known as: Neurontin  Take 2 capsules (800 mg) by mouth 4 times a day.  Medication Adjustments for Surgery: Take morning of surgery with sip of water, no other fluids     ibuprofen 200 mg tablet  Medication Adjustments for Surgery: Stop 7 days before surgery     Pump Patient Supplied Medication     topiramate 100 mg tablet  Commonly known as: Topamax  Take 1 tablet (100 mg) by mouth 2 times a day.  Medication Adjustments for Surgery: Take morning of surgery with sip of water, no other fluids     * traZODone 150 mg tablet  Commonly known as: Desyrel  Take 1 tablet (150 mg) by mouth as needed at bedtime for sleep.  Medication Adjustments for Surgery: Continue until night before surgery     * traZODone 50 mg tablet  Commonly known as: Desyrel   DUPLICATE ENTRY     Vitamin D3 25 MCG (1000 UT) tablet  Generic drug: cholecalciferol  Medication  Adjustments for Surgery: Stop 7 days before surgery           * This list has 6 medication(s) that are the same as other medications prescribed for you. Read the directions carefully, and ask your doctor or other care provider to review them with you.                                  NPO Instructions:    Do not eat any food after midnight the night before your surgery/procedure.    Additional Instructions:     Day of Surgery:  Review your medication instructions, take indicated medications  Wear  comfortable loose fitting clothing  Do not use moisturizers, creams, lotions or perfume  All jewelry and valuables should be left at home  Preoperative Fasting Guidelines    Why must I stop eating and drinking near surgery time?  With sedation, food or liquid in your stomach can enter your lungs causing serious complications  Increases nausea and vomiting    When do I need to stop eating and drinking before my surgery?  Do not eat any food or drink any liquids after midnight the night before your surgery/procedure.  You may have sips of water to take medications.      Patient Information: Pre-Operative Infection Prevention Measures     Why did I have my nose, under my arms, and groin swabbed?  The purpose of the swab is to identify Staphylococcus aureus inside your nose or on your skin.  The swab was sent to the laboratory for culture.  A positive swab/culture for Staphylococcus aureus is called colonization or carriage.      What is Staphylococcus aureus?  Staphylococcus aureus, also known as “staph”, is a germ found on the skin or in the nose of healthy people.  Sometimes Staphylococcus aureus can get into the body and cause an infection.  This can be minor (such as pimples, boils, or other skin problems).  It might also be serious (such as a blood infection, pneumonia, or a surgical site infection).    What is Staphylococcus aureus colonization or carriage?  Colonization or carriage means that a person has the germ but  is not sick from it.  These bacteria can be spread on the hands or when breathing or sneezing.    How is Staphylococcus aureus spread?  It is most often spread by close contact with a person or item that carries it.    What happens if my culture is positive for Staphylococcus aureus?  Your doctor/medical team will use this information to guide any antibiotic treatment which may be necessary.  Regardless of the culture results, we will clean the inside of your nose with a betadine swab just before you have your surgery.      Will I get an infection if I have Staphylococcus aureus in my nose or on my skin?  Anyone can get an infection with Staphylococcus aureus.  However, the best way to reduce your risk of infection is to follow the instructions provided to you for the use of your CHG soap and dental rinse.        Patient Information: Oral/Dental Rinse    What is oral/dental rinse?   It is a mouthwash. It is a way of cleaning the mouth with a germ-killing solution before your surgery.  The solution contains chlorhexidine, commonly known as CHG.   It is used inside the mouth to kill a bacteria known as Staphylococcus aureus.  Let your doctor know if you are allergic to Chlorhexidine.    Why do I need to use CHG oral/dental rinse?  The CHG oral/dental rinse helps to kill a bacteria in your mouth known as Staphylococcus aureus.     This reduces the risk of infection at the surgical site.      Using your CHG oral/dental rinse  AT YOUR PHARMACY  STEPS:  Use your CHG oral/dental rinse after you brush your teeth the night before (at bedtime) and the morning of your surgery.  Follow all directions on your prescription label.    Use the cap on the container to measure 15ml   Swish (gargle if you can) the mouthwash in your mouth for at least 30 seconds, (do not swallow) and spit out  After you use your CHG rinse, do not rinse your mouth with water, drink or eat.  Please refer to the prescription label for the  appropriate time to resume oral intake      What side effects might I have using the CHG oral/dental rinse?  CHG rinse will stick to plaque on the teeth.  Brush and floss just before use.  Teeth brushing will help avoid staining of plaque during use.      Patient Information: Home Preoperative Antibacterial Shower      What is a home preoperative antibacterial shower?  This shower is a way of cleaning the skin with a germ-killing solution before surgery.  The solution contains chlorhexidine, commonly known as CHG.  CHG is a skin cleanser with germ-killing ability.  Let your doctor know if you are allergic to chlorhexidine.    Why do I need to take a preoperative antibacterial shower?  Skin is not sterile.  It is best to try to make your skin as free of germs as possible before surgery.  Proper cleansing with a germ-killing soap before surgery can lower the number of germs on your skin.  This helps to reduce the risk of infection at the surgical site.  Following the instructions listed below will help you prepare your skin for surgery.      How do I use the solution?  Steps:  Begin using your CHG soap JULY 21________________________.    First, wash and rinse your hair . Keep CHG soap away from ear canals and eyes.  Rinse completely, do not condition.  Hair extensions should be removed.  Wash your face with your normal soap and rinse.    Apply the CHG solution to a clean wet washcloth.  Turn the water off or move away from the water spray to avoid premature rinsing of the CHG soap as you are applying.   Firmly lather your entire body from the neck down.  Do not use on your face.  Pay special attention to the area(s) where your incision(s) will be located unless they are on your face.  Avoid scrubbing your skin too hard.  The important point is to have the CHG soap sit on your skin for 3 minutes.    When the 3 minutes are up, turn on the water and rinse the CHG solution off your body completely.   DO NOT wash with  regular soap after you have used the CHG soap solution  Pat yourself dry with a clean, freshly-laundered towel.  DO NOT apply powders, deodorants, or lotions.  Dress in clean, freshly laundered nightclothes.    Be sure to sleep with clean, freshly laundered sheets.  Be aware that CHG will cause stains on fabrics; if you wash them with bleach after use.  Rinse your washcloth and other linens that have contact with CHG completely.  Use only non-chlorine detergents to launder the items used.   The morning of surgery is the fifth day.  Repeat the above steps and dress in clean comfortable clothing     Whom should I contact if I have any questions regarding the use of CHG soap?  Call the [facility] at [phone] if you have any questions.           PAT DISCHARGE INSTRUCTIONS    Please call the Same Day Surgery (SDS) Department of the hospital where your procedure will be performed after 2:00 PM the day before your surgery. If you are scheduled on a Monday, or a Tuesday following a Monday holiday, you will need to call on the last business day prior to your surgery.    [facility]  [address] [place] [address], [id number] [phone]  Second Floor      Please let your surgeon know if:      You develop any open sores, shingles, burning or painful urination as these may increase your risk of an infection.   You no longer wish to have the surgery.   Any other personal circumstances change that may lead to the need to cancel or defer this surgery-such as being sick or getting admitted to any hospital within one week of your planned procedure.    Your contact details change, such as a change of address or phone number.    Starting now:     Please DO NOT drink alcohol or smoke for 24 hours before surgery. It is well known that quitting smoking can make a huge difference to your health and recovery from surgery. The  longer you abstain from smoking, the better your chances of a healthy recovery. If you need help with quitting, call 1800QUIT-NOW to be connected to a trained counselor who will discuss the best methods to help you quit.     Before your surgery:    Please stop all supplements 7 days prior to surgery. Or as directed by your surgeon.   Please stop taking NSAID pain medicine such as Advil and Motrin 7 days before surgery.    If you develop any fever, cough, cold, rashes, cuts, scratches, scrapes, urinary symptoms or infection anywhere on your body (including teeth and gums) prior to surgery, please call your surgeon’s office as soon as possible. This may require treatment to reduce the chance of cancellation on the day of surgery.    The day before your surgery:   DIET- Please follow the diet instructions at the top of your packet.   Get a good night’s rest.  Use the special soap for bathing if you have been instructed to use one.    Scheduled surgery times may change and you will be notified if this occurs - please check your personal voicemail for any updates.     On the morning of surgery:   Wear comfortable, loose fitting clothes which open in the front. Please do not wear moisturizers, creams, lotions, makeup or perfume.    Please bring with you to surgery:   Photo ID and insurance card   Current list of medicines and allergies   Pacemaker/ Defibrillator/Heart stent cards   CPAP machine and mask    Slings/ splints/ crutches   A copy of your complete advanced directive/DHPOA.    Please do NOT bring with you to surgery:   All jewelry and valuables should be left at home.   Prosthetic devices such as contact lenses, hearing aids, dentures, eyelash extensions, hairpins and body piercings must be removed prior to going in to the surgical suite.    After outpatient surgery:   A responsible adult MUST accompany you at the time of discharge and stay with you for 24 hours after your surgery. You may NOT drive yourself  home after surgery.    Do not drive, operate machinery, make critical decisions or do activities that require co-ordination or balance until after a night’s sleep.   Do not drink alcoholic beverages for 24 hours.   Instructions for resuming your medications will be provided by your surgeon.    CALL YOUR DOCTOR AFTER SURGERY IF YOU HAVE:     Chills and/or a fever of 101° F or higher.    Redness, swelling, pus or drainage from your surgical wound or a bad smell from the wound.    Lightheadedness, fainting or confusion.    Persistent vomiting (throwing up) and are not able to eat or drink for 12 hours.    Three or more loose, watery bowel movements in 24 hours (diarrhea).   Difficulty or pain while urinating( after non-urological surgery)    Pain and swelling in your legs, especially if it is only on one side.    Difficulty breathing or are breathing faster than normal.    Any new concerning symptoms.

## 2024-07-17 NOTE — H&P (VIEW-ONLY)
CPM/PAT Evaluation       Name: Davi Sol (Davi Sol)  /Age: 1955/68 y.o.     In-Person       Chief Complaint: abdominal pain    HPI    Pt is a 68 year old male with bilateral inguinal hernia. Pt reports he has known about his right inguinal hernia for many years. Over the past 3 months the pt stated he started to experience left lower abdominal pain and an occasional bulge in his left groin. Pt reports he experiences the most pain when he bears down to have a BM. Pt had recent left shoulder replacement so he has not been lifting objects of considerable weight. Pt denies nausea, vomiting, or bloating. Pt reports he is occasionally constipated. Pt was examined by his surgeon and has been scheduled for  robot-assisted bilateral inguinal hernia repair and removal of right central venous catheter. Pt denies CP, SOB, or dizziness.     Past Medical History:   Diagnosis Date    Adverse effect of anesthesia     Arthritis     BPH with elevated PSA     Current every day smoker     Depression     Fibromyalgia     GERD (gastroesophageal reflux disease)     History of peritonitis     Hyperlipidemia     Insomnia     Movement disorder     Rule out Benny's chorea versus Parkinson's    Osteoporosis     Postlaminectomy syndrome     TIA (transient ischemic attack)        Past Surgical History:   Procedure Laterality Date    BACK SURGERY      X 3    CHOLECYSTECTOMY      COLONOSCOPY W/ BIOPSIES      ESOPHAGOGASTRODUODENOSCOPY      MEDIPORT INSERTION, SINGLE      Has not been flushed or used in 14 years;    OTHER SURGICAL HISTORY      intrathecal  pain pump implant    PENILE PROSTHESIS IMPLANT      SHOULDER ARTHROSCOPY Bilateral     SPINAL CORD STIMULATOR IMPLANT      X 2.  Removal for infection both times    TOTAL KNEE ARTHROPLASTY Left     TOTAL SHOULDER ARTHROPLASTY Left      Social History     Tobacco Use    Smoking status: Every Day     Current packs/day: 0.50     Average packs/day: 0.5  packs/day for 52.5 years (26.3 ttl pk-yrs)     Types: Cigarettes     Start date: 1972     Passive exposure: Current    Smokeless tobacco: Never   Substance Use Topics    Alcohol use: Never     Social History     Substance and Sexual Activity   Drug Use Not Currently    Types: Marijuana       Patient  has no history on file for sexual activity.    Family History   Problem Relation Name Age of Onset    Dementia Mother      Mental illness Mother      Heart attack Father      Diabetes Father      Other (brain tumor) Father      Heart disease Father      Cancer Maternal Grandfather      Leukemia Other      Pancreatic cancer Other         Allergies   Allergen Reactions    Adhesive Tape-Silicones Unknown     Reactions: Blistering    Cephalexin Unknown    Clindamycin Unknown    Linezolid Unknown    Adhesive Rash     water blisters    Penicillins Rash and Swelling     chest swelling     Current Outpatient Medications   Medication Sig Dispense Refill    amantadine (Symmetrel) 100 mg capsule Take 1 capsule (100 mg) by mouth once daily. Do not start before April 7, 2024. 30 capsule 0    amitriptyline (Elavil) 10 mg tablet Take 1 tablet (10 mg) by mouth once daily at bedtime. (Patient not taking: Reported on 6/27/2024) 100 tablet 1    amitriptyline (Elavil) 10 mg tablet Take 1 mg by mouth once daily at bedtime. Indications: disorder characterized by stiff, tender & painful muscles             atorvastatin (Lipitor) 20 mg tablet Take 1 tablet (20 mg) by mouth once daily.      chlorhexidine (Peridex) 0.12 % solution Use as directed. 473 mL 0    cholecalciferol (Vitamin D3) 25 MCG (1000 UT) tablet Take 1 tablet (1,000 Units) by mouth once daily.             gabapentin (Neurontin) 400 mg capsule Take 2 capsules (800 mg) by mouth 4 times a day. 400 capsule 1    ibuprofen 200 mg tablet Take 1 tablet (200 mg) by mouth every 6 hours if needed for mild pain (1 - 3).      Pump Patient Supplied Medication Inject under the skin  "continuously. Patient Own Pump    Meds: morphine and lidocaine  Provider name/ #:   Pump name: medtronic  Last & next fill date: next fill date is the 1st week in April      topiramate (Topamax) 100 mg tablet Take 1 tablet (100 mg) by mouth 2 times a day. 180 tablet 1    traZODone (Desyrel) 150 mg tablet Take 1 tablet (150 mg) by mouth as needed at bedtime for sleep. 100 tablet 1    traZODone (Desyrel) 50 mg tablet Take 3 tablets (150 mg) by mouth once daily at bedtime.       No current facility-administered medications for this visit.     Review of Systems   Constitutional: Negative.    HENT: Negative.     Eyes: Negative.    Respiratory: Negative.     Cardiovascular: Negative.    Gastrointestinal:  Positive for constipation.        Left lower abdominal pain over the left inguinal hernia   Endocrine: Negative.    Genitourinary: Negative.    Musculoskeletal:  Positive for back pain.   Skin: Negative.    Neurological: Negative.    Hematological: Negative.    Psychiatric/Behavioral: Negative.       /89   Pulse 63   Temp 36.4 °C (97.5 °F) (Temporal)   Resp 16   Ht 1.727 m (5' 7.99\")   Wt 62.3 kg (137 lb 4.8 oz)   SpO2 100%   BMI 20.88 kg/m²     Physical Exam  Vitals reviewed.   Constitutional:       Appearance: Normal appearance. He is normal weight.   HENT:      Head: Normocephalic and atraumatic.      Nose: Nose normal.      Mouth/Throat:      Mouth: Mucous membranes are moist.      Pharynx: Oropharynx is clear.      Comments: Tongue thrusting movements  Eyes:      Extraocular Movements: Extraocular movements intact.      Conjunctiva/sclera: Conjunctivae normal.      Pupils: Pupils are equal, round, and reactive to light.   Cardiovascular:      Rate and Rhythm: Normal rate and regular rhythm.      Pulses: Normal pulses.      Heart sounds: Normal heart sounds.      Comments: Mediport right chest   Pulmonary:      Effort: Pulmonary effort is normal.      Breath sounds: Normal breath sounds.   Abdominal:     "  General: Abdomen is flat. Bowel sounds are normal.      Palpations: Abdomen is soft.      Comments: Pain pump palpated left lower abdomen   Musculoskeletal:      Cervical back: Normal range of motion and neck supple.      Comments: Left shoulder replacement on 2024   Skin:     General: Skin is warm and dry.   Neurological:      General: No focal deficit present.      Mental Status: He is alert and oriented to person, place, and time. Mental status is at baseline.   Psychiatric:         Mood and Affect: Mood normal.         Behavior: Behavior normal.         Thought Content: Thought content normal.         Judgment: Judgment normal.        PAT AIRWAY:   Airway:     Mallampati::  II    TM distance::  >3 FB    Neck ROM::  Full   Missing bottom teeth.    upper dentures    ASA:  3  DASI: 24.2  METS: 5.7  CHADS: 4%  RCRI: 0.9%  STOP BAN    Assessment and Plan:     Non-recurrent bilateral inguinal hernia without obstruction or gangrene:  repair inguinal hernia robot-assisted bilateral; removal central venous catheter right.   Hyperlipidemia: managed with atorvastatin.   H/O  TIA in   BPH Followed by urology.    Intrathecal pain pump implant device  located in left lower abdomen. Morphine pump. H/O post laminectomy syndrome.   Penile implant pt follows with a urologist   Mediport located in right chest: non functioning. Pt reports it has not been flushed for 14 years. Pt had it placed for IVIG infusions after being on oral steroids for one year for possible Ulcerative colitis.   Movement disorder/ Chorea: tongue thrusting: [facility]. Pt is taking Amantadine.   S/p left shoulder replacement on 2024.  Daily cigarette smoker.  Occasional marijuana use.    CBC and CMP  completed on 2024  EKG completed on 2024.    Tova Bella, APRN-CNP

## 2024-07-17 NOTE — CPM/PAT H&P
CPM/PAT Evaluation       Name: Davi Sol (Davi Sol)  /Age: 1955/68 y.o.     In-Person       Chief Complaint: abdominal pain    HPI    Pt is a 68 year old male with bilateral inguinal hernia. Pt reports he has known about his right inguinal hernia for many years. Over the past 3 months the pt stated he started to experience left lower abdominal pain and an occasional bulge in his left groin. Pt reports he experiences the most pain when he bears down to have a BM. Pt had recent left shoulder replacement so he has not been lifting objects of considerable weight. Pt denies nausea, vomiting, or bloating. Pt reports he is occasionally constipated. Pt was examined by his surgeon and has been scheduled for  robot-assisted bilateral inguinal hernia repair and removal of right central venous catheter. Pt denies CP, SOB, or dizziness.     Past Medical History:   Diagnosis Date    Adverse effect of anesthesia     Arthritis     BPH with elevated PSA     Current every day smoker     Depression     Fibromyalgia     GERD (gastroesophageal reflux disease)     History of peritonitis     Hyperlipidemia     Insomnia     Movement disorder     Rule out Benny's chorea versus Parkinson's    Osteoporosis     Postlaminectomy syndrome     TIA (transient ischemic attack)        Past Surgical History:   Procedure Laterality Date    BACK SURGERY      X 3    CHOLECYSTECTOMY      COLONOSCOPY W/ BIOPSIES      ESOPHAGOGASTRODUODENOSCOPY      MEDIPORT INSERTION, SINGLE      Has not been flushed or used in 14 years;    OTHER SURGICAL HISTORY      intrathecal  pain pump implant    PENILE PROSTHESIS IMPLANT      SHOULDER ARTHROSCOPY Bilateral     SPINAL CORD STIMULATOR IMPLANT      X 2.  Removal for infection both times    TOTAL KNEE ARTHROPLASTY Left     TOTAL SHOULDER ARTHROPLASTY Left      Social History     Tobacco Use    Smoking status: Every Day     Current packs/day: 0.50     Average packs/day: 0.5  packs/day for 52.5 years (26.3 ttl pk-yrs)     Types: Cigarettes     Start date: 1972     Passive exposure: Current    Smokeless tobacco: Never   Substance Use Topics    Alcohol use: Never     Social History     Substance and Sexual Activity   Drug Use Not Currently    Types: Marijuana       Patient  has no history on file for sexual activity.    Family History   Problem Relation Name Age of Onset    Dementia Mother      Mental illness Mother      Heart attack Father      Diabetes Father      Other (brain tumor) Father      Heart disease Father      Cancer Maternal Grandfather      Leukemia Other      Pancreatic cancer Other         Allergies   Allergen Reactions    Adhesive Tape-Silicones Unknown     Reactions: Blistering    Cephalexin Unknown    Clindamycin Unknown    Linezolid Unknown    Adhesive Rash     water blisters    Penicillins Rash and Swelling     chest swelling     Current Outpatient Medications   Medication Sig Dispense Refill    amantadine (Symmetrel) 100 mg capsule Take 1 capsule (100 mg) by mouth once daily. Do not start before April 7, 2024. 30 capsule 0    amitriptyline (Elavil) 10 mg tablet Take 1 tablet (10 mg) by mouth once daily at bedtime. (Patient not taking: Reported on 6/27/2024) 100 tablet 1    amitriptyline (Elavil) 10 mg tablet Take 1 mg by mouth once daily at bedtime. Indications: disorder characterized by stiff, tender & painful muscles             atorvastatin (Lipitor) 20 mg tablet Take 1 tablet (20 mg) by mouth once daily.      chlorhexidine (Peridex) 0.12 % solution Use as directed. 473 mL 0    cholecalciferol (Vitamin D3) 25 MCG (1000 UT) tablet Take 1 tablet (1,000 Units) by mouth once daily.             gabapentin (Neurontin) 400 mg capsule Take 2 capsules (800 mg) by mouth 4 times a day. 400 capsule 1    ibuprofen 200 mg tablet Take 1 tablet (200 mg) by mouth every 6 hours if needed for mild pain (1 - 3).      Pump Patient Supplied Medication Inject under the skin  "continuously. Patient Own Pump    Meds: morphine and lidocaine  Provider name/ #:   Pump name: medtronic  Last & next fill date: next fill date is the 1st week in April      topiramate (Topamax) 100 mg tablet Take 1 tablet (100 mg) by mouth 2 times a day. 180 tablet 1    traZODone (Desyrel) 150 mg tablet Take 1 tablet (150 mg) by mouth as needed at bedtime for sleep. 100 tablet 1    traZODone (Desyrel) 50 mg tablet Take 3 tablets (150 mg) by mouth once daily at bedtime.       No current facility-administered medications for this visit.     Review of Systems   Constitutional: Negative.    HENT: Negative.     Eyes: Negative.    Respiratory: Negative.     Cardiovascular: Negative.    Gastrointestinal:  Positive for constipation.        Left lower abdominal pain over the left inguinal hernia   Endocrine: Negative.    Genitourinary: Negative.    Musculoskeletal:  Positive for back pain.   Skin: Negative.    Neurological: Negative.    Hematological: Negative.    Psychiatric/Behavioral: Negative.       /89   Pulse 63   Temp 36.4 °C (97.5 °F) (Temporal)   Resp 16   Ht 1.727 m (5' 7.99\")   Wt 62.3 kg (137 lb 4.8 oz)   SpO2 100%   BMI 20.88 kg/m²     Physical Exam  Vitals reviewed.   Constitutional:       Appearance: Normal appearance. He is normal weight.   HENT:      Head: Normocephalic and atraumatic.      Nose: Nose normal.      Mouth/Throat:      Mouth: Mucous membranes are moist.      Pharynx: Oropharynx is clear.      Comments: Tongue thrusting movements  Eyes:      Extraocular Movements: Extraocular movements intact.      Conjunctiva/sclera: Conjunctivae normal.      Pupils: Pupils are equal, round, and reactive to light.   Cardiovascular:      Rate and Rhythm: Normal rate and regular rhythm.      Pulses: Normal pulses.      Heart sounds: Normal heart sounds.      Comments: Mediport right chest   Pulmonary:      Effort: Pulmonary effort is normal.      Breath sounds: Normal breath sounds.   Abdominal:     "  General: Abdomen is flat. Bowel sounds are normal.      Palpations: Abdomen is soft.      Comments: Pain pump palpated left lower abdomen   Musculoskeletal:      Cervical back: Normal range of motion and neck supple.      Comments: Left shoulder replacement on 2024   Skin:     General: Skin is warm and dry.   Neurological:      General: No focal deficit present.      Mental Status: He is alert and oriented to person, place, and time. Mental status is at baseline.   Psychiatric:         Mood and Affect: Mood normal.         Behavior: Behavior normal.         Thought Content: Thought content normal.         Judgment: Judgment normal.        PAT AIRWAY:   Airway:     Mallampati::  II    TM distance::  >3 FB    Neck ROM::  Full   Missing bottom teeth.    upper dentures    ASA:  3  DASI: 24.2  METS: 5.7  CHADS: 4%  RCRI: 0.9%  STOP BAN    Assessment and Plan:     Non-recurrent bilateral inguinal hernia without obstruction or gangrene:  repair inguinal hernia robot-assisted bilateral; removal central venous catheter right.   Hyperlipidemia: managed with atorvastatin.   H/O  TIA in   BPH Followed by urology.    Intrathecal pain pump implant device  located in left lower abdomen. Morphine pump. H/O post laminectomy syndrome.   Penile implant pt follows with a urologist   Mediport located in right chest: non functioning. Pt reports it has not been flushed for 14 years. Pt had it placed for IVIG infusions after being on oral steroids for one year for possible Ulcerative colitis.   Movement disorder/ Chorea: tongue thrusting: [facility] is [facility]. Pt is taking Amantadine.   S/p left shoulder replacement on 2024.  Daily cigarette smoker.  Occasional marijuana use.    CBC and CMP  completed on 2024  EKG completed on 2024.    Tova eBlla, APRN-CNP

## 2024-07-18 RX ORDER — ATORVASTATIN CALCIUM 20 MG/1
20 TABLET, FILM COATED ORAL DAILY
Qty: 90 TABLET | Refills: 1 | Status: SHIPPED | OUTPATIENT
Start: 2024-07-18

## 2024-07-18 RX ORDER — TOPIRAMATE 100 MG/1
100 TABLET, FILM COATED ORAL 2 TIMES DAILY
Qty: 180 TABLET | Refills: 0 | Status: SHIPPED | OUTPATIENT
Start: 2024-07-18

## 2024-07-19 LAB — STAPHYLOCOCCUS SPEC CULT: ABNORMAL

## 2024-07-24 ENCOUNTER — APPOINTMENT (OUTPATIENT)
Dept: NEUROLOGY | Facility: CLINIC | Age: 69
End: 2024-07-24
Payer: MEDICARE

## 2024-07-24 ENCOUNTER — ANESTHESIA EVENT (OUTPATIENT)
Dept: OPERATING ROOM | Facility: HOSPITAL | Age: 69
End: 2024-07-24
Payer: MEDICARE

## 2024-07-24 ENCOUNTER — LAB (OUTPATIENT)
Dept: LAB | Facility: LAB | Age: 69
End: 2024-07-24
Payer: MEDICARE

## 2024-07-24 VITALS
BODY MASS INDEX: 20.38 KG/M2 | WEIGHT: 134 LBS | SYSTOLIC BLOOD PRESSURE: 139 MMHG | HEART RATE: 61 BPM | DIASTOLIC BLOOD PRESSURE: 81 MMHG | RESPIRATION RATE: 18 BRPM

## 2024-07-24 DIAGNOSIS — M25.59 PAIN IN OTHER JOINT: ICD-10-CM

## 2024-07-24 DIAGNOSIS — M19.019 SHOULDER ARTHRITIS: ICD-10-CM

## 2024-07-24 DIAGNOSIS — G25.5 CHOREA: Primary | ICD-10-CM

## 2024-07-24 DIAGNOSIS — G25.5 CHOREA: ICD-10-CM

## 2024-07-24 DIAGNOSIS — R41.3 MEMORY LOSS: ICD-10-CM

## 2024-07-24 PROCEDURE — 36415 COLL VENOUS BLD VENIPUNCTURE: CPT

## 2024-07-24 PROCEDURE — 82607 VITAMIN B-12: CPT

## 2024-07-24 PROCEDURE — 99215 OFFICE O/P EST HI 40 MIN: CPT | Performed by: PSYCHIATRY & NEUROLOGY

## 2024-07-24 PROCEDURE — 1159F MED LIST DOCD IN RCRD: CPT | Performed by: PSYCHIATRY & NEUROLOGY

## 2024-07-24 PROCEDURE — G2211 COMPLEX E/M VISIT ADD ON: HCPCS | Performed by: PSYCHIATRY & NEUROLOGY

## 2024-07-24 PROCEDURE — 85730 THROMBOPLASTIN TIME PARTIAL: CPT

## 2024-07-24 PROCEDURE — 85613 RUSSELL VIPER VENOM DILUTED: CPT

## 2024-07-24 PROCEDURE — 1160F RVW MEDS BY RX/DR IN RCRD: CPT | Performed by: PSYCHIATRY & NEUROLOGY

## 2024-07-24 PROCEDURE — 86147 CARDIOLIPIN ANTIBODY EA IG: CPT

## 2024-07-24 PROCEDURE — 4004F PT TOBACCO SCREEN RCVD TLK: CPT | Performed by: PSYCHIATRY & NEUROLOGY

## 2024-07-24 RX ORDER — AMANTADINE HYDROCHLORIDE 100 MG/1
100 CAPSULE, GELATIN COATED ORAL 2 TIMES DAILY
Qty: 60 CAPSULE | Refills: 5 | Status: SHIPPED | OUTPATIENT
Start: 2024-07-24 | End: 2025-07-24

## 2024-07-24 ASSESSMENT — ENCOUNTER SYMPTOMS
LOSS OF SENSATION IN FEET: 0
OCCASIONAL FEELINGS OF UNSTEADINESS: 0
DEPRESSION: 0

## 2024-07-24 ASSESSMENT — PATIENT HEALTH QUESTIONNAIRE - PHQ9
1. LITTLE INTEREST OR PLEASURE IN DOING THINGS: NOT AT ALL
SUM OF ALL RESPONSES TO PHQ9 QUESTIONS 1 AND 2: 0
2. FEELING DOWN, DEPRESSED OR HOPELESS: NOT AT ALL

## 2024-07-24 NOTE — PATIENT INSTRUCTIONS
It was pleasure to visit you  WE ordered Driving evaluation test.we highly recommend to refrain driving until you do driving test  Restart amantadine  We will do memory testing today  See if you still need topiramate  See genetics

## 2024-07-24 NOTE — PROGRESS NOTES
Subjective     Davi Sol is a   68 y.o. 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  Visit type: new patient visit     [facility]    HPI He developed fidgetiness and abnormal movements in his hands and trunk since 5 years ago which is getting worse.He also noticed shaking in his hands.No imbalance and fall.He also complains of short term memory loss.Does not impair his function.He still drives and independent.No VH.No depression or anxiety.he is more irritable for about a year.  He is on trazadone,topiramate and Citaloperam.He has a pain pump.Has a history of opioid overdose.He was  never on antipsychotic medication or antiemetic medication.    PMH:He is S/P surgery for Lt shoulder.  Arm numbness improved with surgery.    He has ankle Fx when he was 1 YO and his ankle is misshaped.      FH:No FH of neurologic problem.He has 6 siblings,2 passed away form heart attack  He has a 51 YO son is healthy  Social:Fired ceramic in furnaces  Active Smoker  No drug/substance abuse  Does not drink alcohol      Patient Health Questionnaire-2 Score: 0  Brain MRI on 2019: There is moderate brain parenchymal volume loss.     There are nonspecific white matter changes within the cerebral  hemispheres bilaterally as well as ill-defined foci of increased  signal on the FLAIR and T2 weighted images within the brainstem which  while nonspecific, given the patient's age, likely represent sequelae  of more remote small vessel ischemic change. Additional small foci of  bright signal on the T2 weighted images are noted within the  subinsular regions, basal ganglia, and thalami bilaterally suggesting  incidental mildly prominent perivascular spaces and/or small  scattered more remote lacunar infarctions.     The study was interpreted at [facility].      Blood work on 2019:Copper,Ceruloplasmin,AWILDA.TPO 4 years ago normal        Review of Systems  All other system have been reviewed and are negative for complaint.  Objective   Neurological Exam  Mental Status   Oriented to person, place, time and situation.    Cranial Nerves  CN III, IV, VI: Abnormal extraocular movements: No nystagmus. Hypermetric saccades. Diminished smooth pursuit.  CN V: Facial sensation is normal.  CN VII: Full and symmetric facial movement.  CN VIII: Hearing is normal.  CN IX, X: Palate elevates symmetrically    Motor   Strength is 5/5 in all four extremities except as noted.  Lt upper extremity weakness partly is explained due to Lt tendon rupture in proximal but also distal LUE weakness 4/5 distal,Proximal:2/5    LLE:Proximal:4/5 distal:4/5 partly explained by pain .    Sensory  Light touch abnormality: Pinprick abnormality: Vibration abnormality:   Lt lower extremity decreased sensation in light touch and vibration .    Reflexes                                            Right                      Left  Biceps                                 Tr                         Tr  Patellar                                1+                         1+  Achilles                                1+                         1+  Right Plantar: downgoing  Left Plantar: downgoing    Coordination  Right: Finger-to-nose abnormality:    Gait  Casual gait: Normal pull test.  Choreiform gait.      Physical Exam  Eyes:      Extraocular Movements: EOM normalNo nystagmus.   Neurological:      Deep Tendon Reflexes:      Reflex Scores:       Patellar reflexes are 1+ on the right side and 1+ on the left side.       Achilles reflexes are 1+ on the right side and 1+ on the left side.              MoCA today (7/24/24) was 20/30       UNIFIED [facility] RATING SCALE (UHDRS) MOTOR SECTION:    Ocular Pursuit (horizontal)  0-complete  *1-jerky  2-interrupted/full range  3-incomplete range  4-cannot pursue    Ocular Pursuit (vertical)  *0-complete  1-jerky  2-interrupted/full  range  3-incomplete range  4-cannot pursue    Saccade Initiation (horizontal)  *0-normal  1-increased latency  2-suppressible blinks/head movements to  initiate  3-unsuppressible head movements  4-cannot initiate    Saccade Initiation (vertical)  *0-normal  1-increased latency  2-suppressible blinks/head movements to  initiate  3-unsuppressible head movements  4-cannot initiate    * Saccade Velocity (horizontal)  *0-normal  1-mild slowing  2-moderate slowing  3-severely slow, full range  4-incomplete range    * Saccade Velocity (vertical)  *0-normal  1-mild slowing  2-moderate slowing  3-severely slow, full range  4-incomplete range    Dysarthria  0-normal  *1-unclear, no need to repeat  2-must repeat  3-mostly incomprehensible  4-mute    Tongue Protrusion  *0-normal  1-<10 seconds  2-<5 seconds  3-cannot fully protrude  4-cannot beyond lips    Finger Taps (right)  0-normal (15/5sec)  *1-mild slowing or reduction in amp.  2-moderately impaired. may have occasional  arrests (7- 10/15sec)  3-severely impaired. Frequent hesitations and  arrests  4-can barely perform    Finger Taps (left)  0-normal (15/5sec)  *1-mild slowing or reduction in amp.  2-moderately impaired. may have occasional  arrests (7- 10/15sec)  3-severely impaired. Frequent hesitations and  arrests  4-can barely perform    Pronate/Supinate (right)  *0-normal  1-mild slowing/irregular  2-moderate slowing and irregular  3-severe slowing and irregular  4-cannot perform    Pronate/Supinate (left)  *0-normal  1-mild slowing/irregular  2-moderate slowing and irregular  3-severe slowing and irregular  4-cannot perform    Fist-Hand-Palm Sequence  0->4 in 10 seconds without cues  1-<4 in 10 sec. without cues  2->4 in 10 sec. with cues  *3-<4 in 10 sec. with cues  4-cannot perform    Rigidity-arms (right)  *0-absent  1-slight or only with activation  2-mild/moderate  3-severe, full range of motion  4-severe with limited range    Rigidity-arms  (left)  *0-absent  1-slight or only with activation  2-mild/moderate  3-severe, full range of motion  4-severe with limited range    Bradykinesia  *0-normal  1-minimally slow  2-mildly but clearly slow  3-moderately slow  4-marked slowing, long delays in initiation    Maximal Dystonia(trunk)  *0-absent  1-slight/intermittent  2-mild/common or moderate/intermittent  3-moderate/common  4-marked/prolonged    Maximal Dystonia(RUE)  *0-absent  1-slight/intermittent  2-mild/common or moderate/intermittent  3-moderate/common  4-marked/prolonged    Maximal Dystonia(LUE)  *0-absent  1-slight/intermittent  2-mild/common or moderate/intermittent  3-moderate/common  4-marked/prolonged    Maximal Dystonia(RLE)  *0-absent  1-slight/intermittent  2-mild/common or moderate/intermittent  3-moderate/common  4-marked/prolonged    Maximal Dystonia(LLE)  *0-absent  1-slight/intermittent  2-mild/common or moderate/intermittent  3-moderate/common  4-marked/prolonged    Maximal Chorea (Face)  0-absent  1-slight/intermittent  2-mild/common or moderate/intermittent  *3-moderate/common  4-marked/prolonged    Maximal Chorea (GABBIE)  0-absent  1-slight/intermittent  2-mild/common or moderate/intermittent  *3-moderate/common  4-marked/prolonged    Maximal Chorea (Trunk)  0-absent  1-slight/intermittent  *2-mild/common or moderate/intermittent  3-moderate/common  4-marked/prolonged    Maximal Chorea (RUE)  0-absent  1-slight/intermittent  2-mild/common or moderate/intermittent  *3-moderate/common  4-marked/prolonged    Maximal Chorea (LUE)  0-absent  1-slight/intermittent  2-mild/common or moderate/intermittent  *3-moderate/common  4-marked/prolonged    Maximal Chorea (LLE)  0-absent  1-slight/intermittent  2-mild/common or moderate/intermittent  *3-moderate/common  4-marked/prolonged    Maximal Chorea (RLE)  0-absent  1-slight/intermittent  2-mild/common or moderate/intermittent  *3-moderate/common  4-marked/prolonged    Gait  0-normal narrow  base  *1-wide base, and/or slow  2-wide base, walks with difficulty  3-walks with assistance  4-cannot attempt    Tandem Walking  0-normal for 10 steps  *1-1-3 deviations  2->3 deviations  3-cannot complete  4-cannot attempt    Retropulsion  *0-normal  1-recovers spontaneously  2-would fall if not caught  3-falls spontaneously  4-cannot stand                   I once again reviewed records of prior laboratory workup.  Per genetic notes, Stratton disease genetic test was negative.  Dr. Coles checked AWILDA, copper, ceruloplasmin, TSH, and TPO.  Paraneoplastic workup was negative that included AWILDA, PCA, amphiphysin, CRMP 5,  Calcium channel antibodies, acetylcholine receptor antibodies, AGMA, anti-neuronal, and anti-KARLIE.  Antithyroglobulin and thyroid peroxidase were negative as well.  More recently, he had normal AWILDA, CBC with only mild anemia, copper, and ceruloplasmin.    Assessment/Plan Davi Garsia is a   68 y.o. 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  He has Brain MRI on 2019 showed moderate brain parenchymal volume loss and small foci of bright signal on the T2 weighted images are noted within the subinsular regions, basal ganglia, and thalami bilaterally suggesting incidental mildly prominent perivascular spaces and/or small  scattered more remote lacunar infarctions.at this point, I ordered additional laboratory tests (lupus anticoagulant, antiphospholipid, peripheral blood smear, lactase and pyruvate), but he has had a rather extensive workup for sporadic causes.  He was interested in seeing genetics, so we will arrange that.  Amantadine helped a great deal with his movements, but he ran out a few days ago.  His insurance was not covering tetrabenazine, Austedo, or Ingrezza.  Check B12 for memory loss - could be vascular in nature (due to old MRI scan) or related to his neurological condition.  Check B12.  He  tells me he had memory loss already at the time of his last MRI brain.    Plan:  Driving evaluation   He declines physical therapy  Follow-up with myself and neurogenetics  Ordered lupus anticoagulant, antiphospholipid, peripheral blood smear, lactase/pyruvate, and B12  Restart amantadine 1 tab twice daily    47 minutes were spent reviewing medical records, seeing the patient, ordering laboratory tests, reviewing prior labs, and coordinating care.

## 2024-07-25 ENCOUNTER — HOSPITAL ENCOUNTER (OUTPATIENT)
Facility: HOSPITAL | Age: 69
Setting detail: OUTPATIENT SURGERY
Discharge: HOME | End: 2024-07-25
Attending: STUDENT IN AN ORGANIZED HEALTH CARE EDUCATION/TRAINING PROGRAM | Admitting: STUDENT IN AN ORGANIZED HEALTH CARE EDUCATION/TRAINING PROGRAM
Payer: MEDICARE

## 2024-07-25 ENCOUNTER — APPOINTMENT (OUTPATIENT)
Dept: RADIOLOGY | Facility: HOSPITAL | Age: 69
End: 2024-07-25
Payer: MEDICARE

## 2024-07-25 ENCOUNTER — PHARMACY VISIT (OUTPATIENT)
Dept: PHARMACY | Facility: CLINIC | Age: 69
End: 2024-07-25
Payer: MEDICARE

## 2024-07-25 ENCOUNTER — ANESTHESIA (OUTPATIENT)
Dept: OPERATING ROOM | Facility: HOSPITAL | Age: 69
End: 2024-07-25
Payer: MEDICARE

## 2024-07-25 VITALS
SYSTOLIC BLOOD PRESSURE: 129 MMHG | DIASTOLIC BLOOD PRESSURE: 71 MMHG | TEMPERATURE: 97.2 F | RESPIRATION RATE: 16 BRPM | HEART RATE: 62 BPM | OXYGEN SATURATION: 92 %

## 2024-07-25 DIAGNOSIS — K40.20 NON-RECURRENT BILATERAL INGUINAL HERNIA WITHOUT OBSTRUCTION OR GANGRENE: ICD-10-CM

## 2024-07-25 LAB
CARDIOLIPIN IGA SERPL-ACNC: 1.3 APL U/ML
CARDIOLIPIN IGG SER IA-ACNC: <1.6 GPL U/ML
CARDIOLIPIN IGM SER IA-ACNC: 4.3 MPL U/ML
DRVVT SCREEN TO CONFIRM RATIO: 1.05 RATIO
DRVVT/DRVVT CFM NRMLZD PPP-RTO: 1.1 RATIO
DRVVT/DRVVT CFM P DOAC NEUT NORM PPP-RTO: 0.95 RATIO
LA 2 SCREEN W REFLEX-IMP: NORMAL
NORMALIZED SCT PPP-RTO: 0.88 RATIO
SILICA CLOTTING TIME CONFIRMATION: 1.25 RATIO
SILICA CLOTTING TIME SCREEN: 1.09 RATIO
VIT B12 SERPL-MCNC: 1035 PG/ML (ref 211–911)

## 2024-07-25 PROCEDURE — 7100000001 HC RECOVERY ROOM TIME - INITIAL BASE CHARGE: Performed by: STUDENT IN AN ORGANIZED HEALTH CARE EDUCATION/TRAINING PROGRAM

## 2024-07-25 PROCEDURE — 36590 REMOVAL TUNNELED CV CATH: CPT | Performed by: STUDENT IN AN ORGANIZED HEALTH CARE EDUCATION/TRAINING PROGRAM

## 2024-07-25 PROCEDURE — 3600000004 HC OR TIME - INITIAL BASE CHARGE - PROCEDURE LEVEL FOUR: Performed by: STUDENT IN AN ORGANIZED HEALTH CARE EDUCATION/TRAINING PROGRAM

## 2024-07-25 PROCEDURE — 88300 SURGICAL PATH GROSS: CPT | Mod: TC | Performed by: STUDENT IN AN ORGANIZED HEALTH CARE EDUCATION/TRAINING PROGRAM

## 2024-07-25 PROCEDURE — C1769 GUIDE WIRE: HCPCS | Performed by: STUDENT IN AN ORGANIZED HEALTH CARE EDUCATION/TRAINING PROGRAM

## 2024-07-25 PROCEDURE — 2500000005 HC RX 250 GENERAL PHARMACY W/O HCPCS

## 2024-07-25 PROCEDURE — C1781 MESH (IMPLANTABLE): HCPCS | Performed by: STUDENT IN AN ORGANIZED HEALTH CARE EDUCATION/TRAINING PROGRAM

## 2024-07-25 PROCEDURE — 49650 LAP ING HERNIA REPAIR INIT: CPT | Performed by: STUDENT IN AN ORGANIZED HEALTH CARE EDUCATION/TRAINING PROGRAM

## 2024-07-25 PROCEDURE — 2720000007 HC OR 272 NO HCPCS: Performed by: STUDENT IN AN ORGANIZED HEALTH CARE EDUCATION/TRAINING PROGRAM

## 2024-07-25 PROCEDURE — 2500000004 HC RX 250 GENERAL PHARMACY W/ HCPCS (ALT 636 FOR OP/ED): Performed by: ANESTHESIOLOGY

## 2024-07-25 PROCEDURE — 2500000005 HC RX 250 GENERAL PHARMACY W/O HCPCS: Performed by: ANESTHESIOLOGY

## 2024-07-25 PROCEDURE — 76000 FLUOROSCOPY <1 HR PHYS/QHP: CPT

## 2024-07-25 PROCEDURE — 2780000003 HC OR 278 NO HCPCS: Performed by: STUDENT IN AN ORGANIZED HEALTH CARE EDUCATION/TRAINING PROGRAM

## 2024-07-25 PROCEDURE — 2500000004 HC RX 250 GENERAL PHARMACY W/ HCPCS (ALT 636 FOR OP/ED)

## 2024-07-25 PROCEDURE — 2500000004 HC RX 250 GENERAL PHARMACY W/ HCPCS (ALT 636 FOR OP/ED): Performed by: STUDENT IN AN ORGANIZED HEALTH CARE EDUCATION/TRAINING PROGRAM

## 2024-07-25 PROCEDURE — 2500000005 HC RX 250 GENERAL PHARMACY W/O HCPCS: Performed by: STUDENT IN AN ORGANIZED HEALTH CARE EDUCATION/TRAINING PROGRAM

## 2024-07-25 PROCEDURE — 3700000001 HC GENERAL ANESTHESIA TIME - INITIAL BASE CHARGE: Performed by: STUDENT IN AN ORGANIZED HEALTH CARE EDUCATION/TRAINING PROGRAM

## 2024-07-25 PROCEDURE — 7100000010 HC PHASE TWO TIME - EACH INCREMENTAL 1 MINUTE: Performed by: STUDENT IN AN ORGANIZED HEALTH CARE EDUCATION/TRAINING PROGRAM

## 2024-07-25 PROCEDURE — RXMED WILLOW AMBULATORY MEDICATION CHARGE

## 2024-07-25 PROCEDURE — 7100000002 HC RECOVERY ROOM TIME - EACH INCREMENTAL 1 MINUTE: Performed by: STUDENT IN AN ORGANIZED HEALTH CARE EDUCATION/TRAINING PROGRAM

## 2024-07-25 PROCEDURE — 77001 FLUOROGUIDE FOR VEIN DEVICE: CPT | Performed by: STUDENT IN AN ORGANIZED HEALTH CARE EDUCATION/TRAINING PROGRAM

## 2024-07-25 PROCEDURE — 3700000002 HC GENERAL ANESTHESIA TIME - EACH INCREMENTAL 1 MINUTE: Performed by: STUDENT IN AN ORGANIZED HEALTH CARE EDUCATION/TRAINING PROGRAM

## 2024-07-25 PROCEDURE — 7100000009 HC PHASE TWO TIME - INITIAL BASE CHARGE: Performed by: STUDENT IN AN ORGANIZED HEALTH CARE EDUCATION/TRAINING PROGRAM

## 2024-07-25 PROCEDURE — 3600000009 HC OR TIME - EACH INCREMENTAL 1 MINUTE - PROCEDURE LEVEL FOUR: Performed by: STUDENT IN AN ORGANIZED HEALTH CARE EDUCATION/TRAINING PROGRAM

## 2024-07-25 DEVICE — BARD 3DMAX MESH LEFT LARGE
Type: IMPLANTABLE DEVICE | Site: INGUINAL | Status: FUNCTIONAL
Brand: BARD 3DMAX MESH

## 2024-07-25 RX ORDER — LIDOCAINE HYDROCHLORIDE AND EPINEPHRINE 10; 10 MG/ML; UG/ML
INJECTION, SOLUTION INFILTRATION; PERINEURAL AS NEEDED
Status: DISCONTINUED | OUTPATIENT
Start: 2024-07-25 | End: 2024-07-25 | Stop reason: HOSPADM

## 2024-07-25 RX ORDER — ONDANSETRON HYDROCHLORIDE 2 MG/ML
4 INJECTION, SOLUTION INTRAVENOUS ONCE AS NEEDED
Status: DISCONTINUED | OUTPATIENT
Start: 2024-07-25 | End: 2024-07-25 | Stop reason: HOSPADM

## 2024-07-25 RX ORDER — PROPOFOL 10 MG/ML
INJECTION, EMULSION INTRAVENOUS AS NEEDED
Status: DISCONTINUED | OUTPATIENT
Start: 2024-07-25 | End: 2024-07-25

## 2024-07-25 RX ORDER — HYDROMORPHONE HYDROCHLORIDE 0.2 MG/ML
0.1 INJECTION INTRAMUSCULAR; INTRAVENOUS; SUBCUTANEOUS EVERY 5 MIN PRN
Status: DISCONTINUED | OUTPATIENT
Start: 2024-07-25 | End: 2024-07-25 | Stop reason: HOSPADM

## 2024-07-25 RX ORDER — HYDRALAZINE HYDROCHLORIDE 20 MG/ML
5 INJECTION INTRAMUSCULAR; INTRAVENOUS EVERY 30 MIN PRN
Status: DISCONTINUED | OUTPATIENT
Start: 2024-07-25 | End: 2024-07-25 | Stop reason: HOSPADM

## 2024-07-25 RX ORDER — TIZANIDINE 2 MG/1
2 TABLET ORAL EVERY 6 HOURS PRN
Qty: 30 TABLET | Refills: 0 | Status: SHIPPED | OUTPATIENT
Start: 2024-07-25

## 2024-07-25 RX ORDER — LIDOCAINE HYDROCHLORIDE 10 MG/ML
0.1 INJECTION INFILTRATION; PERINEURAL ONCE
Status: DISCONTINUED | OUTPATIENT
Start: 2024-07-25 | End: 2024-07-25 | Stop reason: HOSPADM

## 2024-07-25 RX ORDER — ACETAMINOPHEN 500 MG
1000 TABLET ORAL EVERY 6 HOURS PRN
Qty: 30 TABLET | Refills: 0 | Status: SHIPPED | OUTPATIENT
Start: 2024-07-25

## 2024-07-25 RX ORDER — SODIUM CHLORIDE, SODIUM LACTATE, POTASSIUM CHLORIDE, CALCIUM CHLORIDE 600; 310; 30; 20 MG/100ML; MG/100ML; MG/100ML; MG/100ML
100 INJECTION, SOLUTION INTRAVENOUS CONTINUOUS
Status: DISCONTINUED | OUTPATIENT
Start: 2024-07-25 | End: 2024-07-25 | Stop reason: HOSPADM

## 2024-07-25 RX ORDER — LABETALOL HYDROCHLORIDE 5 MG/ML
INJECTION, SOLUTION INTRAVENOUS AS NEEDED
Status: DISCONTINUED | OUTPATIENT
Start: 2024-07-25 | End: 2024-07-25

## 2024-07-25 RX ORDER — HYDROMORPHONE HYDROCHLORIDE 0.2 MG/ML
0.2 INJECTION INTRAMUSCULAR; INTRAVENOUS; SUBCUTANEOUS EVERY 5 MIN PRN
Status: DISCONTINUED | OUTPATIENT
Start: 2024-07-25 | End: 2024-07-25 | Stop reason: HOSPADM

## 2024-07-25 RX ORDER — FENTANYL CITRATE 50 UG/ML
INJECTION, SOLUTION INTRAMUSCULAR; INTRAVENOUS AS NEEDED
Status: DISCONTINUED | OUTPATIENT
Start: 2024-07-25 | End: 2024-07-25

## 2024-07-25 RX ORDER — ONDANSETRON HYDROCHLORIDE 2 MG/ML
INJECTION, SOLUTION INTRAVENOUS AS NEEDED
Status: DISCONTINUED | OUTPATIENT
Start: 2024-07-25 | End: 2024-07-25

## 2024-07-25 RX ORDER — ROCURONIUM BROMIDE 10 MG/ML
INJECTION, SOLUTION INTRAVENOUS AS NEEDED
Status: DISCONTINUED | OUTPATIENT
Start: 2024-07-25 | End: 2024-07-25

## 2024-07-25 RX ORDER — ALBUTEROL SULFATE 0.83 MG/ML
2.5 SOLUTION RESPIRATORY (INHALATION) ONCE AS NEEDED
Status: DISCONTINUED | OUTPATIENT
Start: 2024-07-25 | End: 2024-07-25 | Stop reason: HOSPADM

## 2024-07-25 RX ORDER — LIDOCAINE HYDROCHLORIDE 10 MG/ML
INJECTION INFILTRATION; PERINEURAL AS NEEDED
Status: DISCONTINUED | OUTPATIENT
Start: 2024-07-25 | End: 2024-07-25

## 2024-07-25 RX ORDER — CEFAZOLIN 1 G/1
INJECTION, POWDER, FOR SOLUTION INTRAVENOUS AS NEEDED
Status: DISCONTINUED | OUTPATIENT
Start: 2024-07-25 | End: 2024-07-25

## 2024-07-25 SDOH — HEALTH STABILITY: MENTAL HEALTH: CURRENT SMOKER: 1

## 2024-07-25 ASSESSMENT — PAIN - FUNCTIONAL ASSESSMENT
PAIN_FUNCTIONAL_ASSESSMENT: CPOT (CRITICAL CARE PAIN OBSERVATION TOOL)
PAIN_FUNCTIONAL_ASSESSMENT: CPOT (CRITICAL CARE PAIN OBSERVATION TOOL)
PAIN_FUNCTIONAL_ASSESSMENT: 0-10
PAIN_FUNCTIONAL_ASSESSMENT: 0-10
PAIN_FUNCTIONAL_ASSESSMENT: CPOT (CRITICAL CARE PAIN OBSERVATION TOOL)
PAIN_FUNCTIONAL_ASSESSMENT: 0-10
PAIN_FUNCTIONAL_ASSESSMENT: CPOT (CRITICAL CARE PAIN OBSERVATION TOOL)
PAIN_FUNCTIONAL_ASSESSMENT: 0-10

## 2024-07-25 ASSESSMENT — PAIN SCALES - GENERAL
PAINLEVEL_OUTOF10: 5 - MODERATE PAIN
PAINLEVEL_OUTOF10: 5 - MODERATE PAIN
PAINLEVEL_OUTOF10: 10 - WORST POSSIBLE PAIN
PAINLEVEL_OUTOF10: 4
PAINLEVEL_OUTOF10: 3
PAINLEVEL_OUTOF10: 10 - WORST POSSIBLE PAIN

## 2024-07-25 ASSESSMENT — PAIN DESCRIPTION - DESCRIPTORS: DESCRIPTORS: ACHING;CRAMPING

## 2024-07-25 NOTE — DISCHARGE INSTRUCTIONS
Keep incisions clean and dry.  There are dissolvable stitches and waterproof glue over top.  You can shower and let soap and water run over the incisions.  The glue will fall off in 1 to 2 weeks.    No heavy lifting more than 10 pounds for 6 weeks.    Call the clinic with questions or concerns.

## 2024-07-25 NOTE — OP NOTE
Repair Inguinal Hernia Robot-Assisted (B) Operative Note     Date: 2024  OR Location: [facility] OR    Name: Davi Sol, : 1955, Age: 68 y.o., MRN: [id number], Sex: male    Diagnosis  Pre-op Diagnosis      * Non-recurrent bilateral inguinal hernia without obstruction or gangrene [K40.20] Post-op Diagnosis     * Non-recurrent bilateral inguinal hernia without obstruction or gangrene [K40.20]     Procedures  Repair Inguinal Hernia Robot-Assisted  39857 - NC LAPAROSCOPY SURG RPR INITIAL INGUINAL HERNIA    Removal Central Venous Catheter  11788 - NC RMVL KAITLYNN CTR VAD W/SUBQ PORT/ CTR/PRPH INSJ      Surgeons   Panel 1:     * Emily Salmon - Primary  Panel 2:     * Emily Salmon - Primary    Resident/Fellow/Other Assistant:  Surgeons and Role:  * No surgeons found with a matching role *    Procedure Summary  Anesthesia: General  ASA: III  Anesthesia Staff: Anesthesiologist: Michael Coyle MD  CRNA: JUSTEN Moody-CRNA  SRNA: Blanche Anthony  Estimated Blood Loss: 5mL  Intra-op Medications:   Administrations occurring from 0730 to 1000 on 24:   Medication Name Total Dose   lidocaine-epinephrine (Xylocaine W/EPI) 1 %-1:100,000 injection 20 mL   lactated Ringer's infusion 83.33 mL              Anesthesia Record               Intraprocedure I/O Totals          Intake    lactated Ringer's infusion 1050.00 mL    Total Intake 1050 mL          Specimen:   ID Type Source Tests Collected by Time   1 : right port Tissue FOREIGN BODY(S) SURGICAL PATHOLOGY EXAM Emily Salmon MD 2024 1025        Staff:   Mahsaulator: Edna  Circulator: Vivian  Scrub Person: Randi Yun Scrub: Taylor          Implants:  Implants       Type Name Action Serial No.      Surgical Mesh Sling Implant PATCH, MESH, HERNIA, 3D MAX, LARGE, LEFT - [id number] Implanted               Findings:   Right-sided indirect and direct inguinal hernia.  Small left-sided indirect hernia, previous mesh placed over the defect and around the  spermatic cord and inguinal canal.  Large essentially loss of domain floor in the direct space  Distal catheter of the port adherent to the brachiocephalic/SVC.  Catheter amputated and sutured to the pectoral fascia    Indications: Davi Sol is an 68 y.o. male who is having surgery for Non-recurrent bilateral inguinal hernia without obstruction or gangrene [K40.20].     The patient was seen in the preoperative area. The risks, benefits, complications, treatment options, non-operative alternatives, expected recovery and outcomes were discussed with the patient. The possibilities of reaction to medication, pulmonary aspiration, injury to surrounding structures, bleeding, recurrent infection, the need for additional procedures, failure to diagnose a condition, and creating a complication requiring transfusion or operation were discussed with the patient. The patient concurred with the proposed plan, giving informed consent.  The site of surgery was properly noted/marked if necessary per policy. The patient has been actively warmed in preoperative area. Preoperative antibiotics have been ordered and given within 1 hours of incision. Venous thrombosis prophylaxis have been ordered including bilateral sequential compression devices    Procedure Details:      The patient was brought back to the operating room placed in the operating table in the supine position.  General anesthesia was induced.  The arms were tucked bilaterally.  Antibiotics were given and a timeout performed.  The abdomen was prepped and draped in the standard sterile fashion.  The patient was extremely thin and had a large pain pump that took up most of the central abdomen.  An incision was made above this just underneath the ribs on the left at Arias's point and a Veress needle was placed and after negative aspiration and positive saline drop test the abdomen was insufflated to 15 mmHg.  A robotic trocar was placed through this site and a  camera inserted to ensure that there was no iatrogenic injury.  2 additional ports were placed under direct visualization after instillation of local anesthesia, 1 in the epigastrium and 1 in the right upper quadrant.  The patient was placed in Trendelenburg and the robot was brought onto the field and docked and I went over to the console for further management.  There is evidence of left indirect and direct inguinal hernia.  On the right there was a large bulge in the central abdomen from the penile implant reservoir that obscured the view.  I was able to see that there has was a previous mesh placed over the indirect space and this was going into the inguinal canal.  The direct space/floor had a loss of domain weakness likely from all of his previous surgery.  There was no neck of the hernia which could lead to future obstruction or incarceration so a right-sided hernia repair was not performed.    I turned my attention back to the left side where I created a peritoneal flap using scissors with sharp dissection and occasionally heat.  This was taken down medially all the way to the pubic tubercle and was also taken out laterally.  There was a indirect and direct hernia which was reduced.  A left sided large mesh was placed into the abdomen and placed within the peritoneal flap.  The intra-abdominal pressure was lowered to 5 mmHg and the peritoneum was then sutured closed using a V-Loc suture.  The robot was undocked and the patient leveled out.  The abdomen was desufflated and the ports removed.  The skin was closed with Monocryl and Dermabond.      Attention was then turned to the port.  The patient remained in Trendelenburg.  The right chest wall was prepped and draped.  Local anesthesia was instilled.  An incision was made over the previous port incision and taken down sharply through the subcutaneous tissue.  There was a encapsulated port that was dissected out.  The catheter was also dissected out.  Using  gentle traction the catheter was removed 7 cm.  At this point I met resistance and could no longer pull any catheter out.  I sharply dissected around the catheter more proximally underneath the clavicle and still was unable to retract any further out.  I used a Hcild needle to try and aspirate or flush saline through the catheter and this was unable to be performed.  I then called radiology into the room with the C arm and used fluoroscopy to inspect the catheter.  There was about 5 cm of catheter still within the vein ending in the right brachiocephalic.  I attempted further dissection around the catheter and still was unable to remove it.  At this point I called Dr. Morgan vascular surgeon who recommended that I try and cannulate the catheter with a wire or use the sheath over the catheter to free it from any attachment along the brachiocephalic since there may be an attachment since the catheter has been in for so long.  The port was transected and sent off as specimen.  The wire was placed into the cannula and using fluoroscopy was threaded through.  It was noted to stop at the tip of the catheter and unable to be threaded into the vein.  This sheath was also unable to be threaded around the catheter to dislodge the attachment in the brachiocephalic.  At this point I elected to cease further attempts.  I used a silk suture to tie off the distal end of the catheter.  Using the same suture, I then made a loop at the end of the suture and sutured it to the pectoral fascia and through the catheter so that it could not dislodge and could be easily found for any future attempts at removal.  Final x-ray was obtained.  The subcutaneous tissue was then closed with a 2-0 Vicryl and the skin was closed with 4-0 Monocryl and Dermabond.  He tolerated the procedure well.  General anesthesia was reversed.  He was transferred to PACU in stable condition.    An assistant was used throughout the case and assisted in retraction,  visualization, and improved the flow of the case.    The robot was used during this case and the assistant facilitated the exchange of instruments and managed the bedside robotic needs while I was at the console.      Disposition: PACU - hemodynamically stable.  Condition: stable         Emily Salmon  Phone Number: [phone]

## 2024-07-25 NOTE — ANESTHESIA PREPROCEDURE EVALUATION
Patient: Davi Slo    Procedure Information       Date/Time: 07/25/24 0730    Procedures:       Repair Inguinal Hernia Robot-Assisted (Bilateral)      Removal Central Venous Catheter (Right)    Location: [facility] OR 12 / Virtual [facility]    Surgeons: Emily Salmon MD          Past Medical History:   Diagnosis Date    Adverse effect of anesthesia     Arthritis     BPH with elevated PSA     Current every day smoker     Depression     Fibromyalgia     GERD (gastroesophageal reflux disease)     History of peritonitis     Hyperlipidemia     Insomnia     Movement disorder     Rule out Cabell's chorea versus Parkinson's    Osteoporosis     Postlaminectomy syndrome     TIA (transient ischemic attack) 2005      Relevant Problems   Cardiac   (+) Mixed hyperlipidemia      Neuro   (+) Anxiety   (+) Mixed anxiety depressive disorder      GI   (+) Gastroesophageal reflux disease   (+) Ulcerative colitis (Multi)      /Renal   (+) BPH with elevated PSA      Liver   (+) Chronic cholecystitis with calculus      Endocrine   (+) Hypothyroidism      Hematology   (+) Chronic deep vein thrombosis (DVT) of popliteal vein (Multi)      Musculoskeletal   (+) Chronic pain syndrome   (+) Degeneration of lumbar or lumbosacral intervertebral disc   (+) Osteoarthritis of knee   (+) Osteoarthritis of left knee     Past Surgical History:   Procedure Laterality Date    BACK SURGERY      X 3    CHOLECYSTECTOMY  2022    COLONOSCOPY W/ BIOPSIES      ESOPHAGOGASTRODUODENOSCOPY      MEDIPORT INSERTION, SINGLE      Has not been flushed or used in 14 years;    OTHER SURGICAL HISTORY      intrathecal  pain pump implant    PENILE PROSTHESIS IMPLANT      SHOULDER ARTHROSCOPY Bilateral     SPINAL CORD STIMULATOR IMPLANT      X 2.  Removal for infection both times    TOTAL KNEE ARTHROPLASTY Left     TOTAL SHOULDER ARTHROPLASTY Left 2024      Clinical information reviewed:   Tobacco  Allergies  Meds   Med Hx  Surg Hx   Fam Hx  Soc Hx        NPO  Detail:  NPO/Void Status  Carbohydrate Drink Given Prior to Surgery? : N  Date of Last Liquid: 07/24/24  Time of Last Liquid: 2130  Date of Last Solid: 07/24/24  Time of Last Solid: 1800  Last Intake Type: Clear fluids  Time of Last Void: 0600         Physical Exam    Airway  Mallampati: III  TM distance: >3 FB  Neck ROM: full     Cardiovascular    Dental    Pulmonary    Abdominal            Anesthesia Plan    History of general anesthesia?: yes  History of complications of general anesthesia?: no    ASA 3     general     The patient is a current smoker.  Patient was previously instructed to abstain from smoking on day of procedure.  Patient smoked on day of procedure.    intravenous induction   Postoperative administration of opioids is intended.  Anesthetic plan and risks discussed with patient.    Plan discussed with attending.

## 2024-07-25 NOTE — ANESTHESIA PROCEDURE NOTES
Airway  Date/Time: 7/25/2024 7:47 AM  Urgency: elective    Airway not difficult    Staffing  Performed: [facility]   Authorized by: Michael Coyle MD    Performed by: Blanche Anthony  Patient location during procedure: OR    Indications and Patient Condition  Indications for airway management: anesthesia  Spontaneous Ventilation: absent  Sedation level: deep  Preoxygenated: yes  Patient position: sniffing  Mask difficulty assessment: 1 - vent by mask  Planned trial extubation    Final Airway Details  Final airway type: endotracheal airway      Successful airway: ETT  Cuffed: yes   Successful intubation technique: direct laryngoscopy  Facilitating devices/methods: intubating stylet  Endotracheal tube insertion site: oral  Blade: Rob  Blade size: #3  ETT size (mm): 7.0  Cormack-Lehane Classification: grade I - full view of glottis  Placement verified by: chest auscultation and capnometry   Measured from: lips  ETT to lips (cm): 22  Number of attempts at approach: 1    Additional Comments  Atraumatic intubation

## 2024-07-25 NOTE — PERIOPERATIVE NURSING NOTE
VSS. IV CONT. PAIN TOLERABLE. NO N/V AT THIS TIME. FAMILY PRESENT. DISCHARGE INSTRUCTIONS REVIEWED AND UNDERSTOOD. LETHARGIC. POX 96% RA. CALL BELL IN REACH. SCOTT. SIPS OF LIQUIDS WELL.   1300 DRESSED. VOIDED 400 ml. WAITING ON HIS RIDE HOME.

## 2024-07-25 NOTE — ANESTHESIA POSTPROCEDURE EVALUATION
Patient: Davi Sol    Procedure Summary       Date: 07/25/24 Room / Location: [facility] OR 12 / [facility] OR    Anesthesia Start: 0732 Anesthesia Stop: 1044    Procedures:       Repair Inguinal Hernia Robot-Assisted (Bilateral)      Removal Central Venous Catheter (Right) Diagnosis:       Non-recurrent bilateral inguinal hernia without obstruction or gangrene      (Non-recurrent bilateral inguinal hernia without obstruction or gangrene [K40.20])    Surgeons: Emily Salmon MD Responsible Provider: Michael Coyle MD    Anesthesia Type: general ASA Status: 3            Anesthesia Type: general    Vitals Value Taken Time   /55 07/25/24 1131   Temp 36.3 °C (97.3 °F) 07/25/24 1040   Pulse 72 07/25/24 1139   Resp 22 07/25/24 1139   SpO2 92 % 07/25/24 1139   Vitals shown include unfiled device data.    Anesthesia Post Evaluation    Patient location during evaluation: PACU  Patient participation: complete - patient participated  Level of consciousness: awake and alert  Pain management: adequate  Airway patency: patent  Cardiovascular status: acceptable  Respiratory status: acceptable  Hydration status: acceptable  Postoperative Nausea and Vomiting: none        There were no known notable events for this encounter.

## 2024-07-26 LAB
LABORATORY COMMENT REPORT: NORMAL
PATH REPORT.FINAL DX SPEC: NORMAL
PATH REPORT.GROSS SPEC: NORMAL
PATH REPORT.RELEVANT HX SPEC: NORMAL
PATH REPORT.TOTAL CANCER: NORMAL

## 2024-07-29 ASSESSMENT — PAIN SCALES - GENERAL: PAINLEVEL_OUTOF10: 4

## 2024-08-12 ENCOUNTER — OFFICE VISIT (OUTPATIENT)
Dept: SURGERY | Facility: CLINIC | Age: 69
End: 2024-08-12
Payer: MEDICARE

## 2024-08-12 VITALS
WEIGHT: 135 LBS | BODY MASS INDEX: 19.99 KG/M2 | SYSTOLIC BLOOD PRESSURE: 118 MMHG | HEIGHT: 69 IN | DIASTOLIC BLOOD PRESSURE: 60 MMHG | TEMPERATURE: 98.2 F | OXYGEN SATURATION: 98 % | HEART RATE: 52 BPM

## 2024-08-12 DIAGNOSIS — Z51.89 AFTERCARE: Primary | ICD-10-CM

## 2024-08-12 PROCEDURE — 1159F MED LIST DOCD IN RCRD: CPT | Performed by: NURSE PRACTITIONER

## 2024-08-12 PROCEDURE — 99211 OFF/OP EST MAY X REQ PHY/QHP: CPT | Performed by: NURSE PRACTITIONER

## 2024-08-12 PROCEDURE — 3008F BODY MASS INDEX DOCD: CPT | Performed by: NURSE PRACTITIONER

## 2024-08-12 PROCEDURE — 4004F PT TOBACCO SCREEN RCVD TLK: CPT | Performed by: NURSE PRACTITIONER

## 2024-08-12 PROCEDURE — 1125F AMNT PAIN NOTED PAIN PRSNT: CPT | Performed by: NURSE PRACTITIONER

## 2024-08-12 ASSESSMENT — ENCOUNTER SYMPTOMS
CONSTITUTIONAL NEGATIVE: 1
PSYCHIATRIC NEGATIVE: 1
GASTROINTESTINAL NEGATIVE: 1
HEMATOLOGIC/LYMPHATIC NEGATIVE: 1
ENDOCRINE NEGATIVE: 1
DEPRESSION: 0
ALLERGIC/IMMUNOLOGIC NEGATIVE: 1
MUSCULOSKELETAL NEGATIVE: 1
RESPIRATORY NEGATIVE: 1
LOSS OF SENSATION IN FEET: 0
CARDIOVASCULAR NEGATIVE: 1
WOUND: 1
NEUROLOGICAL NEGATIVE: 1
EYES NEGATIVE: 1
OCCASIONAL FEELINGS OF UNSTEADINESS: 0

## 2024-08-12 ASSESSMENT — PAIN SCALES - GENERAL: PAINLEVEL: 2

## 2024-08-12 NOTE — PATIENT INSTRUCTIONS
Wound Care  Do not submerge incisions in pool, bath, or hot tub  Do not peel off Dermabond -it will gradually loosen and fall off  ° You may shower with your back to the water and pat incisions dry  Do not apply any lotions, creams, or ointments to your incisions  Activity  Do not push, pull, or lift anything.  Avoid excessive bending and twisting at the waist.  You may walk stairs.  You may sleep in any position that feels comfortable.  You must get up and walk every hour during the day.  If you plan to take a nap during the day, set an alarm for one hour so you will get up and walk around.  Potential Complications   Wound Infection - redness, increased warmth, pain, thick drainage, fever  Blood Clot/Pulmonary Embolism - calf pain or swelling, chest pain, shortness of breath, racing heart, fast breathing  CALL 9-1-1 WITH ANY CHEST PAIN OR SHORTNESS OF BREATH, otherwise call the office with any concerns at [phone].     No bending, twisting, pulling, pushing,  or lifting over 10 pounds for 6 weeks from the date of surgery.

## 2024-08-12 NOTE — PROGRESS NOTES
Subjective   Patient ID: Davi Sol is a 68 y.o. male who presents for Post-op (S/p robot assisted laparoscopic bilateral inguinal hernia repair on 7/25/24).  He is here for post surgical follow up. He underwent robot assisted laparoscopic left inguinal hernia repair and attempted right inguinal hernia repair, with attempted removal of tunneled right chest venous catheter. He is feeling well. No complaints. He does have occasional constipation that he takes MOM for, but then constipation returns. He does not have nausea or vomiting. No abdominal pains.         Review of Systems   Constitutional: Negative.    HENT: Negative.     Eyes: Negative.    Respiratory: Negative.     Cardiovascular: Negative.    Gastrointestinal: Negative.    Endocrine: Negative.    Genitourinary: Negative.    Musculoskeletal: Negative.    Skin:  Positive for wound.   Allergic/Immunologic: Negative.    Neurological: Negative.    Hematological: Negative.    Psychiatric/Behavioral: Negative.         Objective   Physical Exam  Constitutional:       Appearance: Normal appearance.   HENT:      Head: Normocephalic and atraumatic.      Right Ear: Tympanic membrane normal.      Nose: Nose normal.      Mouth/Throat:      Mouth: Mucous membranes are moist.   Eyes:      Pupils: Pupils are equal, round, and reactive to light.   Cardiovascular:      Rate and Rhythm: Normal rate and regular rhythm.      Pulses: Normal pulses.   Pulmonary:      Effort: Pulmonary effort is normal.      Breath sounds: Normal breath sounds.   Abdominal:      General: Bowel sounds are normal. There is no distension.      Tenderness: There is no abdominal tenderness. There is no guarding.      Hernia: No hernia is present.      Comments: Abdominal laparoscopic sites are CDI. No apparent hernias. No tenderness.    Genitourinary:     Rectum: Normal.   Musculoskeletal:         General: Normal range of motion.   Skin:     General: Skin is warm and dry.   Neurological:       General: No focal deficit present.      Mental Status: He is alert.   Psychiatric:         Mood and Affect: Mood normal.         Behavior: Behavior normal.         Assessment/Plan   Diagnoses and all orders for this visit:  Aftercare       Wound Care  Do not submerge incisions in pool, bath, or hot tub  Do not peel off Dermabond -it will gradually loosen and fall off  ° You may shower with your back to the water and pat incisions dry  Do not apply any lotions, creams, or ointments to your incisions  Activity  Do not push, pull, or lift anything.  Avoid excessive bending and twisting at the waist.  You may walk stairs.  You may sleep in any position that feels comfortable.  You must get up and walk every hour during the day.  If you plan to take a nap during the day, set an alarm for one hour so you will get up and walk around.  Potential Complications   Wound Infection - redness, increased warmth, pain, thick drainage, fever  Blood Clot/Pulmonary Embolism - calf pain or swelling, chest pain, shortness of breath, racing heart, fast breathing  CALL 9-1-1 WITH ANY CHEST PAIN OR SHORTNESS OF BREATH, otherwise call the office with any concerns at [phone].     No bending, twisting, pulling, pushing,  or lifting over 10 pounds for 6 weeks from the date of surgery.       JUSTEN Rainey-CNP 08/12/24 12:04 PM

## 2024-08-14 DIAGNOSIS — G62.9 NEUROPATHY: ICD-10-CM

## 2024-08-15 RX ORDER — GABAPENTIN 400 MG/1
CAPSULE ORAL
Qty: 400 CAPSULE | Refills: 0 | Status: SHIPPED | OUTPATIENT
Start: 2024-08-15

## 2024-09-26 DIAGNOSIS — M19.019 SHOULDER ARTHRITIS: ICD-10-CM

## 2024-09-27 RX ORDER — ESCITALOPRAM OXALATE 5 MG/1
5 TABLET ORAL DAILY
Qty: 100 TABLET | Refills: 1 | Status: SHIPPED | OUTPATIENT
Start: 2024-09-27

## 2024-10-03 DIAGNOSIS — M96.1 POST LAMINECTOMY SYNDROME: ICD-10-CM

## 2024-10-03 RX ORDER — AMITRIPTYLINE HYDROCHLORIDE 10 MG/1
10 TABLET, FILM COATED ORAL NIGHTLY
Qty: 100 TABLET | Refills: 1 | Status: SHIPPED | OUTPATIENT
Start: 2024-10-03

## 2024-10-11 ENCOUNTER — APPOINTMENT (OUTPATIENT)
Dept: GENETICS | Facility: CLINIC | Age: 69
End: 2024-10-11
Payer: MEDICARE

## 2024-10-11 ENCOUNTER — APPOINTMENT (OUTPATIENT)
Dept: NEUROLOGY | Facility: CLINIC | Age: 69
End: 2024-10-11
Payer: MEDICARE

## 2024-10-11 VITALS
RESPIRATION RATE: 16 BRPM | SYSTOLIC BLOOD PRESSURE: 146 MMHG | DIASTOLIC BLOOD PRESSURE: 77 MMHG | BODY MASS INDEX: 19.03 KG/M2 | WEIGHT: 127 LBS | HEART RATE: 71 BPM

## 2024-10-11 DIAGNOSIS — G25.5 CHOREA: Primary | ICD-10-CM

## 2024-10-11 DIAGNOSIS — R41.3 MEMORY LOSS: ICD-10-CM

## 2024-10-11 PROCEDURE — 99215 OFFICE O/P EST HI 40 MIN: CPT | Performed by: MEDICAL GENETICS

## 2024-10-11 ASSESSMENT — PAIN SCALES - GENERAL: PAINLEVEL: 8

## 2024-10-11 NOTE — PATIENT INSTRUCTIONS
Pleasure meeting you.   So far the additional blood tests that we have done were not revealing so far. The  will discuss with you any additional testing needed.    We would like to know exactly what medications you are on before we can make any recommendation to change medication doses. Once we find out we will let you know about any changes to try to help with the movements.    We also strongly recommend that you go to the emergency room to have your should and chest pain looked at. This is very important because if you have an injury that is not treated it might heal improperly and cause you more trouble.    We also gave you the information for the [employer]el's society  for you to try and get additional assistance given your's and your wife's current conditions.

## 2024-10-11 NOTE — PROGRESS NOTES
Subjective     Davi Sol is a   69 y.o. 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  Visit type: new patient visit     [facility]    Interval hx  Last seen 07/2024  Patient unsure what medications he is on. States that wife manages all medications. So unsure whether he started taking amantadine. Has not scheduled the driving evaluation yet bu reports he stopped driving. Does not feel has any of his symptoms have worsened since last visit. States he is not looking forward to the cold weather. Denies VH.     Had a fall about a week ago. States it was a mechanical fall as he tripped on shoes that were on the floor. Reports he noted pain across his chest a few days later. Also has stabbing pain when he turns a certain way.    Wants to hold off PT until he figures out what the issue is with his left shoulder    Prior Hx:   He developed fidgetiness and abnormal movements in his hands and trunk since 5 years ago which is getting worse.He also noticed shaking in his hands.No imbalance and fall.He also complains of short term memory loss. Does not impair his function.He still drives and independent.No VH.No depression or anxiety.he is more irritable for about a year.  He is on trazadone,topiramate and Citaloperam.He has a pain pump.Has a history of opioid overdose.He was  never on antipsychotic medication or antiemetic medication.    I once again reviewed records of prior laboratory workup.  Per genetic notes, [place] disease genetic test was negative.  Dr. Coles checked AWILDA, copper, ceruloplasmin, TSH, and TPO.  Paraneoplastic workup was negative that included AWILDA, PCA, amphiphysin, CRMP 5,  Calcium channel antibodies, acetylcholine receptor antibodies, AGMA, anti-neuronal, and anti-KARLIE.  Antithyroglobulin and thyroid peroxidase were negative as well.  More recently, he had normal AWILDA, CBC with only mild anemia, copper, and  ceruloplasmin.    PMH:He is S/P surgery for Lt shoulder.  Arm numbness improved with surgery.    He has ankle Fx when he was 1 YO and his ankle is misshaped.      FH:No FH of neurologic problem.He has 6 siblings,2 passed away form heart attack  He has a 51 YO son is healthy  Social:Fired ceramic in furnaces  Active Smoker  No drug/substance abuse  Does not drink alcohol         Brain MRI on 2019: There is moderate brain parenchymal volume loss.     There are nonspecific white matter changes within the cerebral  hemispheres bilaterally as well as ill-defined foci of increased  signal on the FLAIR and T2 weighted images within the brainstem which  while nonspecific, given the patient's age, likely represent sequelae  of more remote small vessel ischemic change. Additional small foci of  bright signal on the T2 weighted images are noted within the  subinsular regions, basal ganglia, and thalami bilaterally suggesting  incidental mildly prominent perivascular spaces and/or small  scattered more remote lacunar infarctions.     The study was interpreted at [facility].      Blood work on 2019:Copper,Ceruloplasmin,AWILDA.TPO 4 years ago normal       Review of Systems  All other system have been reviewed and are negative for complaint.  Objective   Neurological Exam  Mental Status   Oriented to person, place, time and situation.    Cranial Nerves  CN III, IV, VI: Abnormal extraocular movements: No nystagmus. Hypermetric saccades. Diminished smooth pursuit.  CN V: Facial sensation is normal.  CN VII: Full and symmetric facial movement.  CN VIII: Hearing is normal.  CN IX, X: Palate elevates symmetrically    Motor    Unable to test UE strength due to b/l shoulder pain    LLE:Proximal:4/5 distal:4/5 partly explained by pain   RLE: Prox 4+/5 distal 5-/5.    Reflexes                                            Right                      Left  Biceps                                 Tr                          Tr  Patellar                                1+                         1+    Coordination  Right: Finger-to-nose abnormality:    Gait    Choreiform gait.      Physical Exam  Eyes:      Extraocular Movements: EOM normalNo nystagmus.   Neurological:      Deep Tendon Reflexes:      Reflex Scores:       Patellar reflexes are 1+ on the right side and 1+ on the left side.      MoCA today (7/24/24) was 20/30       UNIFIED [facility] DISEASE RATING SCALE (UHDRS) MOTOR SECTION:    Ocular Pursuit (horizontal)  0-complete  1-jerky  2-interrupted/full range  3-incomplete range  4-cannot pursue    Ocular Pursuit (vertical)  0-complete  1-jerky  2-interrupted/full range  3-incomplete range  4-cannot pursue    Saccade Initiation (horizontal)  *0-normal  1-increased latency  2-suppressible blinks/head movements to  initiate  3-unsuppressible head movements  4-cannot initiate    Saccade Initiation (vertical)  *0-normal  1-increased latency  2-suppressible blinks/head movements to  initiate  3-unsuppressible head movements  4-cannot initiate    * Saccade Velocity (horizontal)  *0-normal  1-mild slowing  2-moderate slowing  3-severely slow, full range  4-incomplete range    * Saccade Velocity (vertical)  *0-normal  1-mild slowing  2-moderate slowing  3-severely slow, full range  4-incomplete range    Dysarthria  0-normal  *1-unclear, no need to repeat  2-must repeat  3-mostly incomprehensible  4-mute    Tongue Protrusion  0-normal  1-<10 seconds  2-<5 seconds  3-cannot fully protrude  4-cannot beyond lips    Finger Taps (right)  0-normal (15/5sec)  *1-mild slowing or reduction in amp.  2-moderately impaired. may have occasional  arrests (7- 10/15sec)  3-severely impaired. Frequent hesitations and  arrests  4-can barely perform    Finger Taps (left)  0-normal (15/5sec)  *1-mild slowing or reduction in amp.  2-moderately impaired. may have occasional  arrests (7- 10/15sec)  3-severely impaired. Frequent hesitations  and  arrests  4-can barely perform    Pronate/Supinate (right)  0-normal  1-mild slowing/irregular  2-moderate slowing and irregular  3-severe slowing and irregular  4-cannot perform    Pronate/Supinate (left)  *0-normal  1-mild slowing/irregular  2-moderate slowing and irregular  3-severe slowing and irregular  4-cannot perform    Fist-Hand-Palm Sequence  0->4 in 10 seconds without cues  1-<4 in 10 sec. without cues  2->4 in 10 sec. with cues  *3-<4 in 10 sec. with cues  4-cannot perform    Rigidity-arms (right)  *0-absent  1-slight or only with activation  2-mild/moderate  3-severe, full range of motion  4-severe with limited range    Rigidity-arms (left)  *0-absent  1-slight or only with activation  2-mild/moderate  3-severe, full range of motion  4-severe with limited range    Bradykinesia  *0-normal  1-minimally slow  2-mildly but clearly slow  3-moderately slow  4-marked slowing, long delays in initiation    Maximal Dystonia(trunk)  *0-absent  1-slight/intermittent  2-mild/common or moderate/intermittent  3-moderate/common  4-marked/prolonged    Maximal Dystonia(RUE)  *0-absent  1-slight/intermittent  2-mild/common or moderate/intermittent  3-moderate/common  4-marked/prolonged    Maximal Dystonia(LUE)  *0-absent  1-slight/intermittent  2-mild/common or moderate/intermittent  3-moderate/common  4-marked/prolonged    Maximal Dystonia(RLE)  *0-absent  1-slight/intermittent  2-mild/common or moderate/intermittent  3-moderate/common  4-marked/prolonged    Maximal Dystonia(LLE)  *0-absent  1-slight/intermittent  2-mild/common or moderate/intermittent  3-moderate/common  4-marked/prolonged    Maximal Chorea (Face)  0-absent  1-slight/intermittent  2-mild/common or moderate/intermittent  *3-moderate/common  4-marked/prolonged    Maximal Chorea (GABBIE)  0-absent  1-slight/intermittent  2-mild/common or moderate/intermittent  *3-moderate/common  4-marked/prolonged    Maximal Chorea  (Trunk)  0-absent  1-slight/intermittent  *2-mild/common or moderate/intermittent  3-moderate/common  4-marked/prolonged    Maximal Chorea (RUE)  0-absent  1-slight/intermittent  2-mild/common or moderate/intermittent  *3-moderate/common  4-marked/prolonged    Maximal Chorea (LUE)  0-absent  1-slight/intermittent  2-mild/common or moderate/intermittent  *3-moderate/common  4-marked/prolonged    Maximal Chorea (LLE)  0-absent  1-slight/intermittent  2-mild/common or moderate/intermittent  *3-moderate/common  4-marked/prolonged    Maximal Chorea (RLE)  0-absent  1-slight/intermittent  2-mild/common or moderate/intermittent  *3-moderate/common  4-marked/prolonged    Gait  0-normal narrow base  *1-wide base, and/or slow  2-wide base, walks with difficulty  3-walks with assistance  4-cannot attempt    Tandem Walking  0-normal for 10 steps  *1-1-3 deviations  2->3 deviations  3-cannot complete  4-cannot attempt    Retropulsion (did not perform today)  *0-normal  1-recovers spontaneously  2-would fall if not caught  3-falls spontaneously  4-cannot stand     Assessment/Plan Davi Garsia is a   68 y.o. 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  He has Brain MRI on 2019 showed moderate brain parenchymal volume loss and small foci of bright signal on the T2 weighted images are noted within the subinsular regions, basal ganglia, and thalami bilaterally suggesting incidental mildly prominent perivascular spaces and/or small  scattered more remote lacunar infarctions.at this point, I ordered additional laboratory tests (lupus anticoagulant, antiphospholipid, peripheral blood smear, lactase and pyruvate), but he has had a rather extensive workup for sporadic causes.  He was interested in seeing genetics, so we will arrange that.  Amantadine helped a great deal with his movements, but he ran out a few days ago.  His insurance was not  covering tetrabenazine, Austedo, or Ingrezza. Memory issues likely vascular (available Mri was done after onset of memory issues). Additional blood work including lupus anticoagulant, antiphospholipid, peripheral blood smear, lactase/pyruvate, and B12 were all unremarkable.    Patient today reports symptoms are overall unchanged although states movements in his arms and legs are improved but worsened in GABBIE. Wife manages all medications and patient cannot state with any confidence whether or not he restarted taking amantadine. Also has significant shoulder and chest pain since a fall about 1 week ago.    Plan:  -Would have to confirm with wife what medications patient is on before making and recommendation for dose changes.  -Strongly encouraged patient to go to urgent care/ED to get shoulder and chest pain assessed. Patient expressed understanding.  -Driving evaluation still pending but has stopped driving    -Patient deferred physical therapy until after shoulder pain issue is resolved  - RTC in 4-6 months

## 2024-10-11 NOTE — PROGRESS NOTES
"Subjective   Patient ID: Davi Sol is a 69 y.o. male who presents for a follow up visit.    HPI  Mr. Sol is a 69-year-old male. He was last seen in genetics on 3/28/2024 when we discussed his negative results for [place] disease.  He is here today for a follow up visit.   He also saw movement disorders neurologist, Dr. Bridger Roldan, today.    Interval History:  Robot assisted laparoscopic bilateral inguinal hernia repair on 7/25/24 by Dr. Emily Salmon. He notes that only one hernia was able to be repaired.   He had a left shoulder replacement in April, healed well.     He believes he was misdiagnosed with ulcerative colitis. He took prednisone for about a year. He no longer has bowel issues.     He has significant tongue movements, which Dr. Bridger Roldan notes seem more pronounced today. 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   He has cognitive issues, per Dr. Roldan. He is unable to drive.       Interval Specialist Evaluations:     Neurology - Bridger Roldan MD; 10/11/24:  \"Assessment/Plan  Davi Sol is a   68 y.o. 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  He has Brain MRI on 2019 showed moderate brain parenchymal volume loss and small foci of bright signal on the T2 weighted images are noted within the subinsular regions, basal ganglia, and thalami bilaterally suggesting incidental mildly prominent perivascular spaces and/or small  scattered more remote lacunar infarctions.at this point, I ordered additional laboratory tests (lupus anticoagulant, antiphospholipid, peripheral blood smear, lactase and pyruvate), but he has had a rather extensive workup for sporadic causes.  He was interested in seeing genetics, so we will arrange that.  Amantadine helped a great deal with his movements, but he ran out a few days ago.  His insurance was not covering " "tetrabenazine, Austedo, or Ingrezza. Memory issues likely vascular (available Mri was done after onset of memory issues). Additional blood work including lupus anticoagulant, antiphospholipid, peripheral blood smear, lactase/pyruvate, and B12 were all unremarkable.     Patient today reports symptoms are overall unchanged although states movements in his arms and legs are improved but worsened in GAB[place]. Wife manages all medications and patient cannot state with any confidence whether or not he restarted taking amantadine. Also has significant shoulder and chest pain since a fall about 1 week ago.     Plan:  -Would have to confirm with wife what medications patient is on before making and recommendation for dose changes.  -Strongly encouraged patient to go to urgent care/ED to get shoulder and chest pain assessed. Patient expressed understanding.  -Driving evaluation still pending but has stopped driving    -Patient deferred physical therapy until after shoulder pain issue is resolved  - RTC in 4-6 months\"      Dr. Roldan noted that patient's spinal cord stimulator would need to be turned off in order for him to undergo brain MRI.    Neurology - Bridger Roldan MD; 7/24/24:  “Davi Garsia is a   68 y.o. 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  He has Brain MRI on 2019 showed moderate brain parenchymal volume loss and small foci of bright signal on the T2 weighted images are noted within the subinsular regions, basal ganglia, and thalami bilaterally suggesting incidental mildly prominent perivascular spaces and/or small  scattered more remote lacunar infarctions.at this point, I ordered additional laboratory tests (lupus anticoagulant, antiphospholipid, peripheral blood smear, lactase and pyruvate), but he has had a rather extensive workup for sporadic causes.  He was interested in seeing genetics, so we will arrange " that.  Amantadine helped a great deal with his movements, but he ran out a few days ago.  His insurance was not covering tetrabenazine, Austedo, or Ingrezza.  Check B12 for memory loss - could be vascular in nature (due to old MRI scan) or related to his neurological condition.  Check B12.  He tells me he had memory loss already at the time of his last MRI brain.     Plan:  Driving evaluation   He declines physical therapy  Follow-up with myself and neurogenetics  Ordered lupus anticoagulant, antiphospholipid, peripheral blood smear, lactase/pyruvate, and B12  Restart amantadine 1 tab twice daily”    Assessment/Plan     It was a pleasure to see you again today.     Because of your chorea movements, you would be eligible for additional genetic testing. Dr. Roldan and I are still concerned you could have a genetic cause for your chorea because no other cause has been found.     “No-cost genetic testing for 938 genes associated with various genetic movement disorders is being offered for qualifying  and [facility]-resident patients through a program sponsored by [employer].”  [employer] would receive deidentified data in return.   Results take 2-4 weeks.  This panel includes chorea genes, but also examines genes related to other movement disorders.  This could be sent today.    The results could be positive, negative, or inconclusive. Carrier status can also be revealed.  Carriers do not have a particular disorder but would be at risk to have a child with a disorder if their partner is also a carrier.  When a person is a carrier, their family members would be at increased risk to be a carrier.    We discussed that Dr. Roldan did a lot of testing for acquired causes and has not identified a cause.     Plan:    Cheek swab collected today for comprehensive movement disorders panel, which is performed at [facility] lab. My office will call you with results. Your phone number is [phone]. I requested  manual release of your results as you do not wish to have results released to [facility].     Virtual or in person follow-up will be scheduled for positive or inconclusive results, or carrier status.      If negative, consider referral to a genetics research program (no cost or travel, would just send blood and records) for further testing as your insurance is not likely to cover whole exome sequencing.  (www.raregenomes.org)  You are currently thinking you would not like to do additional testing this year. In that case, I would recommend one year follow up.    Cc Dr. Roldan     If you have any questions, please call Sharmaine Montero in the [facility] at [phone] option 1 or at her direct number [phone] or send me a non-urgent message through [facility].     Nikkie Waletr MD  Clinical     Care coordination/discussion time: 2:10 - 2:14 pm    Visit Time: 2:20 - 3:02 pm    Documentation Time: 3:20 - 3:22 pm    Scribe Attestation   I, Justyna Groves, am scribing for, and in presence of, MD Federico Peters's Statement:  The documentation recorded by Justyna Groves acting in Scribe, in my presence, accurately and completely reflects the service(s), I personally performed, and the decisions made by me.  Nikkie Walter MD

## 2024-10-11 NOTE — LETTER
10/12/24    Alcira Wooten MD  [address] [facility] [address]  [place] [address]      Dear Dr. Alcira Wooten MD,    I am writing to confirm that your patient, Davi Sol  received care in my office on 10/12/24. I have enclosed a summary of the care provided to Davi for your reference.    Please contact me with any questions you may have regarding the visit.    Sincerely,         Nikkie Walter MD  [address] [facility] [facility] [address]  [facility] [address]  [phone]    CC: No Recipients

## 2024-10-13 DIAGNOSIS — G62.9 NEUROPATHY: ICD-10-CM

## 2024-10-14 RX ORDER — GABAPENTIN 400 MG/1
CAPSULE ORAL
Qty: 400 CAPSULE | Refills: 0 | Status: SHIPPED | OUTPATIENT
Start: 2024-10-14

## 2024-10-15 ENCOUNTER — APPOINTMENT (OUTPATIENT)
Dept: PRIMARY CARE | Facility: CLINIC | Age: 69
End: 2024-10-15
Payer: MEDICARE

## 2024-10-15 ENCOUNTER — APPOINTMENT (OUTPATIENT)
Dept: UROLOGY | Facility: CLINIC | Age: 69
End: 2024-10-15
Payer: MEDICARE

## 2024-10-15 DIAGNOSIS — Z12.5 ENCOUNTER FOR SCREENING FOR MALIGNANT NEOPLASM OF PROSTATE: ICD-10-CM

## 2024-10-15 DIAGNOSIS — R35.0 BENIGN PROSTATIC HYPERPLASIA WITH URINARY FREQUENCY: Primary | ICD-10-CM

## 2024-10-15 DIAGNOSIS — N40.1 BENIGN PROSTATIC HYPERPLASIA WITH URINARY FREQUENCY: Primary | ICD-10-CM

## 2024-10-15 DIAGNOSIS — N52.01 ERECTILE DYSFUNCTION DUE TO ARTERIAL INSUFFICIENCY: ICD-10-CM

## 2024-10-15 LAB
POC APPEARANCE, URINE: CLEAR
POC BILIRUBIN, URINE: NEGATIVE
POC BLOOD, URINE: NEGATIVE
POC COLOR, URINE: YELLOW
POC GLUCOSE, URINE: NEGATIVE MG/DL
POC KETONES, URINE: NEGATIVE MG/DL
POC LEUKOCYTES, URINE: NEGATIVE
POC NITRITE,URINE: NEGATIVE
POC PH, URINE: 5.5 PH
POC PROTEIN, URINE: NEGATIVE MG/DL
POC SPECIFIC GRAVITY, URINE: >=1.03
POC UROBILINOGEN, URINE: 0.2 EU/DL

## 2024-10-15 PROCEDURE — 51798 US URINE CAPACITY MEASURE: CPT | Performed by: SURGERY

## 2024-10-15 PROCEDURE — 99204 OFFICE O/P NEW MOD 45 MIN: CPT | Performed by: SURGERY

## 2024-10-15 PROCEDURE — 4004F PT TOBACCO SCREEN RCVD TLK: CPT | Performed by: SURGERY

## 2024-10-15 PROCEDURE — 1160F RVW MEDS BY RX/DR IN RCRD: CPT | Performed by: SURGERY

## 2024-10-15 PROCEDURE — 1125F AMNT PAIN NOTED PAIN PRSNT: CPT | Performed by: SURGERY

## 2024-10-15 PROCEDURE — 81003 URINALYSIS AUTO W/O SCOPE: CPT | Performed by: SURGERY

## 2024-10-15 PROCEDURE — 1159F MED LIST DOCD IN RCRD: CPT | Performed by: SURGERY

## 2024-10-15 RX ORDER — TAMSULOSIN HYDROCHLORIDE 0.4 MG/1
0.4 CAPSULE ORAL EVERY EVENING
Qty: 30 CAPSULE | Refills: 0 | Status: SHIPPED | OUTPATIENT
Start: 2024-10-15

## 2024-10-15 ASSESSMENT — PAIN SCALES - GENERAL: PAINLEVEL: 7

## 2024-10-15 NOTE — PROGRESS NOTES
Subjective   Patient ID: Davi Sol is a 69 y.o. male who presents for Urinary Frequency and Urinary Urgency.    HPI  He is well-known to me from my previous practice.  He has a history of BPH, with lower urinary symptoms.  Today he comes in with complaints of urgency and frequency.  He is quite bothered by his symptoms.  He also has a history of erectile dysfunction.  He has had several penile implants, the most recent one was implanted in 2017.  He states that it does work, however he has not been recently sexually active.  He denies any maira hematuria.      Review of Systems  As per HPI  Positive is arthritis        Objective   Physical Exam  Well nourished  Abdomen soft, ND, NT, no hernias  Circumcised, patent meatus, no lesions  Palpable cylinder tips, L>R  Bilateral descended testes, no masses  Palpable pump in scrotum            Assessment/Plan   Problem List Items Addressed This Visit    None  Visit Diagnoses         Codes    Benign prostatic hyperplasia with urinary frequency    -  Primary N40.1, R35.0    Relevant Medications    tamsulosin (Flomax) 0.4 mg 24 hr capsule    Other Relevant Orders    Measure post void residual (Completed)    POCT UA Automated manually resulted (Completed)    Follow Up In Urology    Erectile dysfunction due to arterial insufficiency     N52.01    Encounter for screening for malignant neoplasm of prostate     Z12.5             1. BPH.  He seems to be bothered by his urinary symptoms.  His urinalysis today is negative.  His postvoid residual is normal.  I will start him on Flomax 0.4 mg every evening.  We will have a follow-up phone call in 1 month.    2. Erectile dysfunction.  He is status post penile prosthesis.  His implant seems to be functioning well.  He did inquire about a penile pump, however I informed him that this will likely not improve his function.    3. Prostate cancer screening.  His most recent PSA is 0.3.  This is normal.              Jc Rodriguez MD  10/15/24 11:01 AM

## 2024-10-21 ENCOUNTER — APPOINTMENT (OUTPATIENT)
Dept: PRIMARY CARE | Facility: CLINIC | Age: 69
End: 2024-10-21
Payer: MEDICARE

## 2024-10-21 VITALS
HEART RATE: 96 BPM | SYSTOLIC BLOOD PRESSURE: 122 MMHG | DIASTOLIC BLOOD PRESSURE: 84 MMHG | OXYGEN SATURATION: 97 % | BODY MASS INDEX: 18.88 KG/M2 | TEMPERATURE: 98.2 F | WEIGHT: 126 LBS

## 2024-10-21 DIAGNOSIS — R91.8 LUNG NODULES: Primary | ICD-10-CM

## 2024-10-21 DIAGNOSIS — G25.9 MOVEMENT DISORDER: ICD-10-CM

## 2024-10-21 DIAGNOSIS — Z23 NEED FOR VACCINATION: ICD-10-CM

## 2024-10-21 DIAGNOSIS — E78.2 MIXED HYPERLIPIDEMIA: ICD-10-CM

## 2024-10-21 DIAGNOSIS — R07.89 CHEST WALL PAIN: ICD-10-CM

## 2024-10-21 DIAGNOSIS — R63.4 UNINTENDED WEIGHT LOSS: ICD-10-CM

## 2024-10-21 PROCEDURE — G0008 ADMIN INFLUENZA VIRUS VAC: HCPCS | Performed by: STUDENT IN AN ORGANIZED HEALTH CARE EDUCATION/TRAINING PROGRAM

## 2024-10-21 PROCEDURE — 1158F ADVNC CARE PLAN TLK DOCD: CPT | Performed by: STUDENT IN AN ORGANIZED HEALTH CARE EDUCATION/TRAINING PROGRAM

## 2024-10-21 PROCEDURE — 1159F MED LIST DOCD IN RCRD: CPT | Performed by: STUDENT IN AN ORGANIZED HEALTH CARE EDUCATION/TRAINING PROGRAM

## 2024-10-21 PROCEDURE — 90662 IIV NO PRSV INCREASED AG IM: CPT | Performed by: STUDENT IN AN ORGANIZED HEALTH CARE EDUCATION/TRAINING PROGRAM

## 2024-10-21 PROCEDURE — 1123F ACP DISCUSS/DSCN MKR DOCD: CPT | Performed by: STUDENT IN AN ORGANIZED HEALTH CARE EDUCATION/TRAINING PROGRAM

## 2024-10-21 PROCEDURE — 4004F PT TOBACCO SCREEN RCVD TLK: CPT | Performed by: STUDENT IN AN ORGANIZED HEALTH CARE EDUCATION/TRAINING PROGRAM

## 2024-10-21 PROCEDURE — 99214 OFFICE O/P EST MOD 30 MIN: CPT | Performed by: STUDENT IN AN ORGANIZED HEALTH CARE EDUCATION/TRAINING PROGRAM

## 2024-10-21 PROCEDURE — 1125F AMNT PAIN NOTED PAIN PRSNT: CPT | Performed by: STUDENT IN AN ORGANIZED HEALTH CARE EDUCATION/TRAINING PROGRAM

## 2024-10-21 RX ORDER — TOPIRAMATE 100 MG/1
100 TABLET, FILM COATED ORAL 2 TIMES DAILY
Qty: 180 TABLET | Refills: 3 | Status: SHIPPED | OUTPATIENT
Start: 2024-10-21

## 2024-10-21 RX ORDER — LIDOCAINE 50 MG/G
1 PATCH TOPICAL DAILY
Qty: 30 PATCH | Refills: 11 | Status: SHIPPED | OUTPATIENT
Start: 2024-10-21 | End: 2025-10-21

## 2024-10-21 ASSESSMENT — ENCOUNTER SYMPTOMS
NAUSEA: 0
CONSTIPATION: 0
POLYDIPSIA: 0
UNEXPECTED WEIGHT CHANGE: 1
SLEEP DISTURBANCE: 0
DYSURIA: 0
VOMITING: 0
FATIGUE: 0
ARTHRALGIAS: 0
SINUS PAIN: 0
LIGHT-HEADEDNESS: 0
RHINORRHEA: 0
WOUND: 0
DIZZINESS: 0
DYSPHORIC MOOD: 0
SINUS PRESSURE: 0
PALPITATIONS: 0
DIARRHEA: 0
FREQUENCY: 0
HEADACHES: 0
COUGH: 0
SHORTNESS OF BREATH: 0
FEVER: 0
NERVOUS/ANXIOUS: 0
MYALGIAS: 0
CHILLS: 0
WHEEZING: 0

## 2024-10-21 ASSESSMENT — PATIENT HEALTH QUESTIONNAIRE - PHQ9
2. FEELING DOWN, DEPRESSED OR HOPELESS: NOT AT ALL
SUM OF ALL RESPONSES TO PHQ9 QUESTIONS 1 AND 2: 0
1. LITTLE INTEREST OR PLEASURE IN DOING THINGS: NOT AT ALL

## 2024-10-21 ASSESSMENT — PAIN SCALES - GENERAL: PAINLEVEL_OUTOF10: 8

## 2024-10-21 NOTE — PROGRESS NOTES
Subjective   Patient ID: Davi Sol is a 69 y.o. male who presents for New Patient Visit and Fall (Pt stated he tripped over his shoe at home, no pain at the time but 3 days after he started to feel dull pain in his chest, the more he moves the more it hurts, can't bend over x 2 weeks ago).    Patient here today in follow up after recent fall at home.  The fall at this point occurred approximately 2 weeks ago.  He has been having some right shoulder and right sided chest pain since then.  He was seen a week ago by neurologist for follow-up for tics and choreiform movements.  At that time he was advised to seek out emergency care for the pain which he  declined.    Since that time, he is continue to have severe right sided chest wall pain.  Pain is worse with movement of his upper extremity.  He initially was having significant pain in the right scapula as well, but this is since subsided.    Patient has also had marked weight loss over the last several months.  He reports weighing around 155 lb at the beginning of this year.         Review of Systems   Constitutional:  Positive for unexpected weight change. Negative for chills, fatigue and fever.   HENT:  Negative for congestion, hearing loss, rhinorrhea, sinus pressure, sinus pain and tinnitus.    Eyes:  Negative for visual disturbance.   Respiratory:  Negative for cough, shortness of breath and wheezing.    Cardiovascular:  Positive for chest pain. Negative for palpitations and leg swelling.   Gastrointestinal:  Negative for constipation, diarrhea, nausea and vomiting.   Endocrine: Negative for cold intolerance, heat intolerance, polydipsia and polyuria.   Genitourinary:  Negative for dysuria, frequency and urgency.   Musculoskeletal:  Negative for arthralgias and myalgias.   Skin:  Negative for pallor, rash and wound.   Neurological:  Negative for dizziness, light-headedness and headaches.   Psychiatric/Behavioral:  Negative for dysphoric mood and sleep  disturbance. The patient is not nervous/anxious.        Objective     Visit Vitals  /84 (BP Location: Left arm)   Pulse 96   Temp 36.8 °C (98.2 °F) (Temporal)   Wt 57.2 kg (126 lb)   SpO2 97%   BMI 18.88 kg/m²   Smoking Status Every Day   BSA 1.66 m²         Physical Exam  Constitutional:       Appearance: Normal appearance. He is cachectic. He is ill-appearing.   HENT:      Head: Normocephalic and atraumatic.      Right Ear: Tympanic membrane, ear canal and external ear normal.      Left Ear: Tympanic membrane, ear canal and external ear normal.      Nose: Nose normal.      Mouth/Throat:      Mouth: Mucous membranes are moist.      Pharynx: Oropharynx is clear.   Eyes:      Extraocular Movements: Extraocular movements intact.      Conjunctiva/sclera: Conjunctivae normal.      Pupils: Pupils are equal, round, and reactive to light.   Cardiovascular:      Rate and Rhythm: Normal rate and regular rhythm.      Pulses: Normal pulses.      Heart sounds: Normal heart sounds.   Pulmonary:      Effort: Pulmonary effort is normal.      Breath sounds: Normal breath sounds.   Abdominal:      General: There is no distension.      Palpations: Abdomen is soft.      Tenderness: There is no abdominal tenderness.   Musculoskeletal:         General: Normal range of motion.      Comments: Tender to palpation of right chest wall   Skin:     General: Skin is warm and dry.   Neurological:      Mental Status: He is alert and oriented to person, place, and time. Mental status is at baseline.   Psychiatric:         Mood and Affect: Mood normal.         Behavior: Behavior normal.         Assessment & Plan  Need for vaccination    Orders:    Flu vaccine, trivalent, preservative free, HIGH-DOSE, age 65y+ (Fluzone)    Lung nodules    Orders:    CT chest wo IV contrast; Future    Unintended weight loss    Orders:    CT chest wo IV contrast; Future    Tsh With Reflex To Free T4 If Abnormal; Future  Patient has an extremely extensive smoking  history with known lung nodules on most recent LDCT 2 years ago, 30 pound unintended weight loss and severe chest pain after a mild ground-level mechanical fall.  From my perspective is concerning for a primary lung malignancy with possible pathological fracture.  We will obtain cross-sectional imaging of the chest to evaluate further.  Mixed hyperlipidemia    Orders:    CBC; Future    Comprehensive metabolic panel; Future    Lipid panel; Future    Chest wall pain    Orders:    lidocaine (Lidoderm) 5 % patch; Place 1 patch over 12 hours on the skin once daily. Apply to painful area 12 hours per day, remove for 12 hours.       Reviewed and approved by BETTYE BARAHONA on 10/21/24 at 8:22 PM.

## 2024-10-21 NOTE — TELEPHONE ENCOUNTER
LV 6/20/24, [place] today with Dr Jacome covering for Dr Wooten    Topjonniemate 100 mg    [facility]

## 2024-10-22 ENCOUNTER — APPOINTMENT (OUTPATIENT)
Dept: PRIMARY CARE | Facility: CLINIC | Age: 69
End: 2024-10-22
Payer: MEDICARE

## 2024-10-25 ENCOUNTER — TELEPHONE (OUTPATIENT)
Dept: PRIMARY CARE | Facility: CLINIC | Age: 69
End: 2024-10-25
Payer: MEDICARE

## 2024-10-25 NOTE — TELEPHONE ENCOUNTER
Patient's is having a hard time getting a cat scan, due to the scheduling committee wants to schedule MRI from another Provider with the CT from Dr Jacome. Patient blood pressure is also elevating 198/145 at 6:30 am, then at 9:00 am 168/78 155/83 just now, Patient isn't taking BP medication for BP. Should he wait for JOAQUÍN for the CT scan? please advise about the BP reading. Patient stated he will not go to the ER, he refuses to go there. The CT scan was not schedule as a stat exam and I do not see in Dr. Rice notes that there is an idication for CT to be completed stat. He may schedule at the same time as the MRI that has been ordered of his brain of that is how they are trying to arrange.   For the blood pressure, if his reading remain above 190/100- he will need to go to the ER, especially if he experiences any headache, weakness, dizziness- signs of stroke. If BP remains elevated 140/90, he needs to make an appointment within the next week with his PCP. Patient was advised what covering Provider ISABELA recommended. Patient stated he's still in Pain and would like [facility] to give him a call. Patient can be reached at [phone] Patient is aware that JOAQUÍN is out of the office. He's willing to wait until JOAQUÍN returns on Monday 10-28.24.

## 2024-10-29 ENCOUNTER — LAB (OUTPATIENT)
Dept: LAB | Facility: LAB | Age: 69
End: 2024-10-29
Payer: MEDICARE

## 2024-10-29 DIAGNOSIS — R63.4 UNINTENDED WEIGHT LOSS: ICD-10-CM

## 2024-10-29 DIAGNOSIS — E78.2 MIXED HYPERLIPIDEMIA: ICD-10-CM

## 2024-10-29 LAB
ALBUMIN SERPL BCP-MCNC: 3.9 G/DL (ref 3.4–5)
ALP SERPL-CCNC: 85 U/L (ref 33–136)
ALT SERPL W P-5'-P-CCNC: 18 U/L (ref 10–52)
ANION GAP SERPL CALCULATED.3IONS-SCNC: 11 MMOL/L (ref 10–20)
AST SERPL W P-5'-P-CCNC: 17 U/L (ref 9–39)
BILIRUB SERPL-MCNC: 0.4 MG/DL (ref 0–1.2)
BUN SERPL-MCNC: 14 MG/DL (ref 6–23)
CALCIUM SERPL-MCNC: 9.3 MG/DL (ref 8.6–10.3)
CHLORIDE SERPL-SCNC: 105 MMOL/L (ref 98–107)
CHOLEST SERPL-MCNC: 126 MG/DL (ref 0–199)
CHOLEST/HDLC SERPL: 2.7 {RATIO}
CO2 SERPL-SCNC: 27 MMOL/L (ref 21–32)
CREAT SERPL-MCNC: 1.09 MG/DL (ref 0.5–1.3)
EGFRCR SERPLBLD CKD-EPI 2021: 73 ML/MIN/1.73M*2
ERYTHROCYTE [DISTWIDTH] IN BLOOD BY AUTOMATED COUNT: 13.6 % (ref 11.5–14.5)
GLUCOSE SERPL-MCNC: 104 MG/DL (ref 74–99)
HCT VFR BLD AUTO: 41.6 % (ref 41–52)
HDLC SERPL-MCNC: 47 MG/DL
HGB BLD-MCNC: 13.4 G/DL (ref 13.5–17.5)
LDLC SERPL CALC-MCNC: 63 MG/DL
MCH RBC QN AUTO: 30.7 PG (ref 26–34)
MCHC RBC AUTO-ENTMCNC: 32.2 G/DL (ref 32–36)
MCV RBC AUTO: 95 FL (ref 80–100)
NON HDL CHOLESTEROL: 79 MG/DL (ref 0–149)
NRBC BLD-RTO: 0 /100 WBCS (ref 0–0)
PLATELET # BLD AUTO: 285 X10*3/UL (ref 150–450)
POTASSIUM SERPL-SCNC: 3.9 MMOL/L (ref 3.5–5.3)
PROT SERPL-MCNC: 6.4 G/DL (ref 6.4–8.2)
RBC # BLD AUTO: 4.37 X10*6/UL (ref 4.5–5.9)
SODIUM SERPL-SCNC: 139 MMOL/L (ref 136–145)
TRIGL SERPL-MCNC: 80 MG/DL (ref 0–149)
TSH SERPL-ACNC: 0.56 MIU/L (ref 0.44–3.98)
VLDL: 16 MG/DL (ref 0–40)
WBC # BLD AUTO: 6.1 X10*3/UL (ref 4.4–11.3)

## 2024-10-29 PROCEDURE — 84443 ASSAY THYROID STIM HORMONE: CPT

## 2024-10-29 PROCEDURE — 85027 COMPLETE CBC AUTOMATED: CPT

## 2024-10-29 PROCEDURE — 36415 COLL VENOUS BLD VENIPUNCTURE: CPT

## 2024-10-29 PROCEDURE — 80053 COMPREHEN METABOLIC PANEL: CPT

## 2024-10-29 PROCEDURE — 80061 LIPID PANEL: CPT

## 2024-10-31 ENCOUNTER — TELEPHONE (OUTPATIENT)
Dept: GENETICS | Facility: CLINIC | Age: 69
End: 2024-10-31
Payer: MEDICARE

## 2024-11-03 DIAGNOSIS — G47.9 SLEEP DISTURBANCES: ICD-10-CM

## 2024-11-04 ENCOUNTER — HOSPITAL ENCOUNTER (OUTPATIENT)
Dept: RADIOLOGY | Facility: HOSPITAL | Age: 69
Discharge: HOME | End: 2024-11-04
Payer: MEDICARE

## 2024-11-04 ENCOUNTER — APPOINTMENT (OUTPATIENT)
Dept: RADIOLOGY | Facility: HOSPITAL | Age: 69
End: 2024-11-04
Payer: MEDICARE

## 2024-11-04 DIAGNOSIS — R63.4 UNINTENDED WEIGHT LOSS: ICD-10-CM

## 2024-11-04 DIAGNOSIS — R91.8 LUNG NODULES: ICD-10-CM

## 2024-11-04 PROCEDURE — 71250 CT THORAX DX C-: CPT | Performed by: RADIOLOGY

## 2024-11-04 PROCEDURE — 71250 CT THORAX DX C-: CPT

## 2024-11-04 RX ORDER — TRAZODONE HYDROCHLORIDE 150 MG/1
150 TABLET ORAL NIGHTLY PRN
Qty: 100 TABLET | Refills: 1 | Status: SHIPPED | OUTPATIENT
Start: 2024-11-04

## 2024-11-12 ENCOUNTER — APPOINTMENT (OUTPATIENT)
Dept: UROLOGY | Facility: CLINIC | Age: 69
End: 2024-11-12
Payer: MEDICARE

## 2024-11-12 DIAGNOSIS — R35.0 BENIGN PROSTATIC HYPERPLASIA WITH URINARY FREQUENCY: Primary | ICD-10-CM

## 2024-11-12 DIAGNOSIS — N40.1 BENIGN PROSTATIC HYPERPLASIA WITH URINARY FREQUENCY: Primary | ICD-10-CM

## 2024-11-12 PROCEDURE — 1123F ACP DISCUSS/DSCN MKR DOCD: CPT | Performed by: SURGERY

## 2024-11-12 PROCEDURE — G2211 COMPLEX E/M VISIT ADD ON: HCPCS | Performed by: SURGERY

## 2024-11-12 PROCEDURE — 1160F RVW MEDS BY RX/DR IN RCRD: CPT | Performed by: SURGERY

## 2024-11-12 PROCEDURE — 1159F MED LIST DOCD IN RCRD: CPT | Performed by: SURGERY

## 2024-11-12 PROCEDURE — 4004F PT TOBACCO SCREEN RCVD TLK: CPT | Performed by: SURGERY

## 2024-11-12 PROCEDURE — 99212 OFFICE O/P EST SF 10 MIN: CPT | Performed by: SURGERY

## 2024-11-12 RX ORDER — TAMSULOSIN HYDROCHLORIDE 0.4 MG/1
0.4 CAPSULE ORAL EVERY EVENING
Qty: 30 CAPSULE | Refills: 0 | Status: SHIPPED | OUTPATIENT
Start: 2024-11-12

## 2024-11-12 RX ORDER — SOLIFENACIN SUCCINATE 5 MG/1
5 TABLET, FILM COATED ORAL DAILY
Qty: 30 TABLET | Refills: 0 | Status: SHIPPED | OUTPATIENT
Start: 2024-11-12

## 2024-11-12 RX ORDER — TAMSULOSIN HYDROCHLORIDE 0.4 MG/1
0.4 CAPSULE ORAL DAILY
Qty: 90 CAPSULE | Refills: 1 | Status: SHIPPED | OUTPATIENT
Start: 2024-11-12

## 2024-11-12 NOTE — PROGRESS NOTES
I, Dr. Jc Rodriguez, saw this patient via Telehealth today.  The details of the visit are listed below.  This visit was done via telephone only.         History of Present Illness (HPI):  TODAY (11/12/24)  Davi Sol is a 69 y.o. male presenting virtually for follow-up regarding his lower urinary symptoms.  Last month he started Flomax 0.4 mg.  He has noticed some relief in his symptoms.  However he is still bothered by his urgency and urge incontinence episodes.  His stream is fair.  Sometimes he has longer intervals between trips to the bathroom.  His postvoid residual last visit was normal.      Past Medical History:   Diagnosis Date    Adverse effect of anesthesia     Arthritis     BPH with elevated PSA     Current every day smoker     Depression     Fibromyalgia     GERD (gastroesophageal reflux disease)     History of peritonitis     Hyperlipidemia     Insomnia     Movement disorder     Rule out [place]'s chorea versus Parkinson's    Osteoporosis     Postlaminectomy syndrome     TIA (transient ischemic attack) 2005     Past Surgical History:   Procedure Laterality Date    BACK SURGERY      X 3    CHOLECYSTECTOMY  2022    COLONOSCOPY W/ BIOPSIES      ESOPHAGOGASTRODUODENOSCOPY      HERNIA REPAIR Bilateral 07/25/2024    MEDIPORT INSERTION, SINGLE      Has not been flushed or used in 14 years;    OTHER SURGICAL HISTORY      intrathecal  pain pump implant    PENILE PROSTHESIS IMPLANT      SHOULDER ARTHROSCOPY Bilateral     SPINAL CORD STIMULATOR IMPLANT      X 2.  Removal for infection both times    TOTAL KNEE ARTHROPLASTY Left     TOTAL SHOULDER ARTHROPLASTY Left 2024     Family History   Problem Relation Name Age of Onset    Dementia Mother      Mental illness Mother      Heart attack Father      Diabetes Father      Other (brain tumor) Father      Heart disease Father      Cancer Maternal Grandfather      Leukemia Other      Pancreatic cancer Other       Social History     Tobacco Use   Smoking  Status Every Day    Current packs/day: 0.50    Average packs/day: 0.5 packs/day for 52.9 years (26.4 ttl pk-yrs)    Types: Cigarettes    Start date: 1972    Passive exposure: Current   Smokeless Tobacco Never     Current Outpatient Medications   Medication Sig Dispense Refill    acetaminophen (Tylenol) 500 mg tablet Take 2 tablets (1,000 mg) by mouth every 6 hours if needed for mild pain (1 - 3). 30 tablet 0    amantadine (Symmetrel) 100 mg capsule Take 1 capsule (100 mg) by mouth 2 times a day. 60 capsule 5    amitriptyline (Elavil) 10 mg tablet TAKE 1 TABLET BY MOUTH AT BEDTIME 100 tablet 1    atorvastatin (Lipitor) 20 mg tablet TAKE 1 TABLET ONCE DAILY 90 tablet 1    cholecalciferol (Vitamin D3) 25 MCG (1000 UT) tablet Take 1 tablet (1,000 Units) by mouth once daily.      escitalopram (Lexapro) 5 mg tablet TAKE 1 TABLET (5 MG) BY MOUTH ONCE DAILY. 100 tablet 1    gabapentin (Neurontin) 400 mg capsule TAKE TWO CAPSULES BY MOUTH FOUR TIMES A  capsule 0    ibuprofen 200 mg tablet Take 1 tablet (200 mg) by mouth every 6 hours if needed for mild pain (1 - 3).      lidocaine (Lidoderm) 5 % patch Place 1 patch over 12 hours on the skin once daily. Apply to painful area 12 hours per day, remove for 12 hours. 30 patch 11    Pump Patient Supplied Medication Inject under the skin continuously. Patient Own Pump    Meds: morphine and lidocaine  Provider name/ #:   Pump name: [facility]  Last & next fill date: next fill date is the 1st week in April      tamsulosin (Flomax) 0.4 mg 24 hr capsule Take 1 capsule (0.4 mg) by mouth once daily in the evening. (Patient not taking: Reported on 10/21/2024) 30 capsule 0    tiZANidine (Zanaflex) 2 mg tablet Take 1 tablet (2 mg) by mouth every 6 hours if needed for muscle spasms. 30 tablet 0    topiramate (Topamax) 100 mg tablet Take 1 tablet (100 mg) by mouth 2 times a day. 180 tablet 3    traZODone (Desyrel) 150 mg tablet TAKE 1 TABLET BY MOUTH AT BEDTIME AS NEEDED FOR SLEEP 100  tablet 1     No current facility-administered medications for this visit.     Allergies   Allergen Reactions    Adhesive Tape-Silicones Unknown     Reactions: Blistering    Cephalexin Unknown    Clindamycin Unknown    Linezolid Unknown    Adhesive Rash     water blisters    Penicillins Rash and Swelling     chest swelling     Past medical, surgical, family and social history in the chart was reviewed and is accurate including any additions to what is in this HPI.    Review of systems (ROS):   Pertinent information as listed in the HPI.     Objective   There were no vitals taken for this visit.  PHYSICAL EXAM:  Exam limited 2/2 telehealth visit.     Lab Review      ASSESSMENT:  BPH w/ irritative symptoms    Problem List Items Addressed This Visit    None     PLAN:  I will refill his Flomax today.  We discussed adding a second medication to help with his urgency.  I will start him on Vesicare 5 mg once a day.  We will have another follow-up phone visit in 1 month.      All questions were answered to the patient’s satisfaction.  Patient agrees with the plan and wishes to proceed.  Follow-up for ongoing care of chronic medical conditions.  Follow-up will be scheduled appropriately.         Jc Rodriguez MD

## 2024-11-17 DIAGNOSIS — E78.2 MIXED HYPERLIPIDEMIA: ICD-10-CM

## 2024-11-18 ENCOUNTER — APPOINTMENT (OUTPATIENT)
Dept: PRIMARY CARE | Facility: CLINIC | Age: 69
End: 2024-11-18
Payer: MEDICARE

## 2024-11-18 ENCOUNTER — APPOINTMENT (OUTPATIENT)
Dept: RADIOLOGY | Facility: HOSPITAL | Age: 69
End: 2024-11-18
Payer: MEDICARE

## 2024-11-18 ENCOUNTER — HOSPITAL ENCOUNTER (EMERGENCY)
Facility: HOSPITAL | Age: 69
Discharge: HOME | End: 2024-11-18
Attending: STUDENT IN AN ORGANIZED HEALTH CARE EDUCATION/TRAINING PROGRAM
Payer: MEDICARE

## 2024-11-18 ENCOUNTER — APPOINTMENT (OUTPATIENT)
Dept: CARDIOLOGY | Facility: HOSPITAL | Age: 69
End: 2024-11-18
Payer: MEDICARE

## 2024-11-18 VITALS
SYSTOLIC BLOOD PRESSURE: 116 MMHG | DIASTOLIC BLOOD PRESSURE: 64 MMHG | OXYGEN SATURATION: 96 % | TEMPERATURE: 98 F | HEART RATE: 65 BPM | WEIGHT: 130 LBS | BODY MASS INDEX: 19.48 KG/M2

## 2024-11-18 VITALS
BODY MASS INDEX: 19.55 KG/M2 | HEART RATE: 75 BPM | WEIGHT: 129 LBS | TEMPERATURE: 98.8 F | RESPIRATION RATE: 18 BRPM | HEIGHT: 68 IN | DIASTOLIC BLOOD PRESSURE: 92 MMHG | SYSTOLIC BLOOD PRESSURE: 132 MMHG | OXYGEN SATURATION: 98 %

## 2024-11-18 DIAGNOSIS — G62.9 NEUROPATHY: ICD-10-CM

## 2024-11-18 DIAGNOSIS — R29.898 RIGHT ARM WEAKNESS: Primary | ICD-10-CM

## 2024-11-18 DIAGNOSIS — G56.31 ACUTE RADIAL NERVE PALSY OF RIGHT UPPER EXTREMITY: Primary | ICD-10-CM

## 2024-11-18 DIAGNOSIS — G25.5 CHOREA: ICD-10-CM

## 2024-11-18 LAB
ALBUMIN SERPL BCP-MCNC: 3.6 G/DL (ref 3.4–5)
ALP SERPL-CCNC: 81 U/L (ref 33–136)
ALT SERPL W P-5'-P-CCNC: 18 U/L (ref 10–52)
ANION GAP SERPL CALCULATED.3IONS-SCNC: 9 MMOL/L (ref 10–20)
APPEARANCE UR: CLEAR
APTT PPP: 30.1 SECONDS (ref 22–32.5)
AST SERPL W P-5'-P-CCNC: 17 U/L (ref 9–39)
BASOPHILS # BLD AUTO: 0.07 X10*3/UL (ref 0–0.1)
BASOPHILS NFR BLD AUTO: 1.8 %
BILIRUB SERPL-MCNC: 0.4 MG/DL (ref 0–1.2)
BILIRUB UR STRIP.AUTO-MCNC: NEGATIVE MG/DL
BUN SERPL-MCNC: 13 MG/DL (ref 6–23)
CALCIUM SERPL-MCNC: 8.7 MG/DL (ref 8.6–10.3)
CARDIAC TROPONIN I PNL SERPL HS: 3 NG/L (ref 0–20)
CARDIAC TROPONIN I PNL SERPL HS: 3 NG/L (ref 0–20)
CHLORIDE SERPL-SCNC: 108 MMOL/L (ref 98–107)
CO2 SERPL-SCNC: 25 MMOL/L (ref 21–32)
COLOR UR: NORMAL
CREAT SERPL-MCNC: 0.93 MG/DL (ref 0.5–1.3)
EGFRCR SERPLBLD CKD-EPI 2021: 88 ML/MIN/1.73M*2
EOSINOPHIL # BLD AUTO: 0.12 X10*3/UL (ref 0–0.7)
EOSINOPHIL NFR BLD AUTO: 3.1 %
ERYTHROCYTE [DISTWIDTH] IN BLOOD BY AUTOMATED COUNT: 14.2 % (ref 11.5–14.5)
GLUCOSE SERPL-MCNC: 89 MG/DL (ref 74–99)
GLUCOSE UR STRIP.AUTO-MCNC: NORMAL MG/DL
HCT VFR BLD AUTO: 39.4 % (ref 41–52)
HGB BLD-MCNC: 13.2 G/DL (ref 13.5–17.5)
IMM GRANULOCYTES # BLD AUTO: 0.01 X10*3/UL (ref 0–0.7)
IMM GRANULOCYTES NFR BLD AUTO: 0.3 % (ref 0–0.9)
INR PPP: 1.1 (ref 0.9–1.2)
KETONES UR STRIP.AUTO-MCNC: NEGATIVE MG/DL
LEUKOCYTE ESTERASE UR QL STRIP.AUTO: NEGATIVE
LYMPHOCYTES # BLD AUTO: 1.44 X10*3/UL (ref 1.2–4.8)
LYMPHOCYTES NFR BLD AUTO: 36.8 %
MCH RBC QN AUTO: 31.7 PG (ref 26–34)
MCHC RBC AUTO-ENTMCNC: 33.5 G/DL (ref 32–36)
MCV RBC AUTO: 95 FL (ref 80–100)
MONOCYTES # BLD AUTO: 0.25 X10*3/UL (ref 0.1–1)
MONOCYTES NFR BLD AUTO: 6.4 %
NEUTROPHILS # BLD AUTO: 2.02 X10*3/UL (ref 1.2–7.7)
NEUTROPHILS NFR BLD AUTO: 51.6 %
NITRITE UR QL STRIP.AUTO: NEGATIVE
NRBC BLD-RTO: 0 /100 WBCS (ref 0–0)
PH UR STRIP.AUTO: 6 [PH]
PLATELET # BLD AUTO: 180 X10*3/UL (ref 150–450)
POTASSIUM SERPL-SCNC: 3.6 MMOL/L (ref 3.5–5.3)
PROT SERPL-MCNC: 6.2 G/DL (ref 6.4–8.2)
PROT UR STRIP.AUTO-MCNC: NEGATIVE MG/DL
PROTHROMBIN TIME: 11.9 SECONDS (ref 9.3–12.7)
RBC # BLD AUTO: 4.17 X10*6/UL (ref 4.5–5.9)
RBC # UR STRIP.AUTO: NEGATIVE /UL
SODIUM SERPL-SCNC: 138 MMOL/L (ref 136–145)
SP GR UR STRIP.AUTO: 1.02
UROBILINOGEN UR STRIP.AUTO-MCNC: NORMAL MG/DL
WBC # BLD AUTO: 3.9 X10*3/UL (ref 4.4–11.3)

## 2024-11-18 PROCEDURE — 84484 ASSAY OF TROPONIN QUANT: CPT | Performed by: PHYSICIAN ASSISTANT

## 2024-11-18 PROCEDURE — 36415 COLL VENOUS BLD VENIPUNCTURE: CPT | Performed by: PHYSICIAN ASSISTANT

## 2024-11-18 PROCEDURE — 85610 PROTHROMBIN TIME: CPT | Performed by: PHYSICIAN ASSISTANT

## 2024-11-18 PROCEDURE — 85730 THROMBOPLASTIN TIME PARTIAL: CPT | Performed by: PHYSICIAN ASSISTANT

## 2024-11-18 PROCEDURE — 71045 X-RAY EXAM CHEST 1 VIEW: CPT | Performed by: RADIOLOGY

## 2024-11-18 PROCEDURE — 1159F MED LIST DOCD IN RCRD: CPT | Performed by: STUDENT IN AN ORGANIZED HEALTH CARE EDUCATION/TRAINING PROGRAM

## 2024-11-18 PROCEDURE — 93005 ELECTROCARDIOGRAM TRACING: CPT

## 2024-11-18 PROCEDURE — 70450 CT HEAD/BRAIN W/O DYE: CPT

## 2024-11-18 PROCEDURE — 81003 URINALYSIS AUTO W/O SCOPE: CPT | Performed by: PHYSICIAN ASSISTANT

## 2024-11-18 PROCEDURE — 85025 COMPLETE CBC W/AUTO DIFF WBC: CPT | Performed by: PHYSICIAN ASSISTANT

## 2024-11-18 PROCEDURE — 1123F ACP DISCUSS/DSCN MKR DOCD: CPT | Performed by: STUDENT IN AN ORGANIZED HEALTH CARE EDUCATION/TRAINING PROGRAM

## 2024-11-18 PROCEDURE — 71045 X-RAY EXAM CHEST 1 VIEW: CPT

## 2024-11-18 PROCEDURE — 4004F PT TOBACCO SCREEN RCVD TLK: CPT | Performed by: STUDENT IN AN ORGANIZED HEALTH CARE EDUCATION/TRAINING PROGRAM

## 2024-11-18 PROCEDURE — 96374 THER/PROPH/DIAG INJ IV PUSH: CPT

## 2024-11-18 PROCEDURE — 70450 CT HEAD/BRAIN W/O DYE: CPT | Performed by: RADIOLOGY

## 2024-11-18 PROCEDURE — 1158F ADVNC CARE PLAN TLK DOCD: CPT | Performed by: STUDENT IN AN ORGANIZED HEALTH CARE EDUCATION/TRAINING PROGRAM

## 2024-11-18 PROCEDURE — 80053 COMPREHEN METABOLIC PANEL: CPT | Performed by: PHYSICIAN ASSISTANT

## 2024-11-18 PROCEDURE — 2500000004 HC RX 250 GENERAL PHARMACY W/ HCPCS (ALT 636 FOR OP/ED): Performed by: STUDENT IN AN ORGANIZED HEALTH CARE EDUCATION/TRAINING PROGRAM

## 2024-11-18 PROCEDURE — 99215 OFFICE O/P EST HI 40 MIN: CPT | Performed by: STUDENT IN AN ORGANIZED HEALTH CARE EDUCATION/TRAINING PROGRAM

## 2024-11-18 PROCEDURE — 99285 EMERGENCY DEPT VISIT HI MDM: CPT | Mod: 25

## 2024-11-18 RX ORDER — ATORVASTATIN CALCIUM 20 MG/1
20 TABLET, FILM COATED ORAL DAILY
Qty: 90 TABLET | Refills: 1 | Status: SHIPPED | OUTPATIENT
Start: 2024-11-18

## 2024-11-18 RX ORDER — MORPHINE SULFATE 4 MG/ML
4 INJECTION, SOLUTION INTRAMUSCULAR; INTRAVENOUS ONCE
Status: COMPLETED | OUTPATIENT
Start: 2024-11-18 | End: 2024-11-18

## 2024-11-18 ASSESSMENT — COLUMBIA-SUICIDE SEVERITY RATING SCALE - C-SSRS
6. HAVE YOU EVER DONE ANYTHING, STARTED TO DO ANYTHING, OR PREPARED TO DO ANYTHING TO END YOUR LIFE?: NO
2. HAVE YOU ACTUALLY HAD ANY THOUGHTS OF KILLING YOURSELF?: NO
1. IN THE PAST MONTH, HAVE YOU WISHED YOU WERE DEAD OR WISHED YOU COULD GO TO SLEEP AND NOT WAKE UP?: NO

## 2024-11-18 ASSESSMENT — PAIN DESCRIPTION - PAIN TYPE: TYPE: ACUTE PAIN

## 2024-11-18 ASSESSMENT — PAIN SCALES - GENERAL: PAINLEVEL_OUTOF10: 5 - MODERATE PAIN

## 2024-11-18 ASSESSMENT — PAIN - FUNCTIONAL ASSESSMENT: PAIN_FUNCTIONAL_ASSESSMENT: 0-10

## 2024-11-18 ASSESSMENT — PAIN DESCRIPTION - DESCRIPTORS: DESCRIPTORS: DULL;STABBING

## 2024-11-18 ASSESSMENT — PAIN DESCRIPTION - LOCATION: LOCATION: ARM

## 2024-11-18 ASSESSMENT — PAIN DESCRIPTION - ORIENTATION: ORIENTATION: RIGHT

## 2024-11-18 ASSESSMENT — PAIN DESCRIPTION - FREQUENCY: FREQUENCY: INTERMITTENT

## 2024-11-18 NOTE — ED PROVIDER NOTES
HPI   Chief Complaint   Patient presents with    Extremity Weakness     Pt to ED for weakness in the right arm that began on Friday. Pt also reported pain in the arm described as dull with moments of stabbing.        This is a 69 year-old male with a past medical history of hypertension, hyperlipidemia senting to the emergency department with weakness of right arm since Friday.  Patient states he woke up on Friday morning and immediately upon waking up he noticed weakness in his right wrist and he could not extend digits of left hand.  He also noticed weakness with trying to bend his left elbow.  Symptoms have been ongoing since Friday however have gradually improved.  Patient denies any headache, visual disturbances, chest pain, shortness of breath, problems with speaking, difficulty finding the words to say, weakness in his legs or left upper extremity.      Please see HPI for pertinent positive and negative ROS.         Patient History   Past Medical History:   Diagnosis Date    Adverse effect of anesthesia     Arthritis     BPH with elevated PSA     Current every day smoker     Depression     Fibromyalgia     GERD (gastroesophageal reflux disease)     History of peritonitis     Hyperlipidemia     Insomnia     Movement disorder     Rule out [place]'s chorea versus Parkinson's    Osteoporosis     Postlaminectomy syndrome     TIA (transient ischemic attack) 2005     Past Surgical History:   Procedure Laterality Date    BACK SURGERY      X 3    CHOLECYSTECTOMY  2022    COLONOSCOPY W/ BIOPSIES      ESOPHAGOGASTRODUODENOSCOPY      HERNIA REPAIR Bilateral 07/25/2024    MEDIPORT INSERTION, SINGLE      Has not been flushed or used in 14 years;    OTHER SURGICAL HISTORY      intrathecal  pain pump implant    PENILE PROSTHESIS IMPLANT      SHOULDER ARTHROSCOPY Bilateral     SPINAL CORD STIMULATOR IMPLANT      X 2.  Removal for infection both times    TOTAL KNEE ARTHROPLASTY Left     TOTAL SHOULDER ARTHROPLASTY Left 2024      Family History   Problem Relation Name Age of Onset    Dementia Mother      Mental illness Mother      Heart attack Father      Diabetes Father      Other (brain tumor) Father      Heart disease Father      Cancer Maternal Grandfather      Leukemia Other      Pancreatic cancer Other       Social History     Tobacco Use    Smoking status: Every Day     Current packs/day: 0.50     Average packs/day: 0.5 packs/day for 52.9 years (26.4 ttl pk-yrs)     Types: Cigarettes     Start date: 1972     Passive exposure: Current    Smokeless tobacco: Never   Vaping Use    Vaping status: Never Used   Substance Use Topics    Alcohol use: Never    Drug use: Never     Types: Marijuana       Physical Exam   ED Triage Vitals [11/18/24 1117]   Temperature Heart Rate Respirations BP   37.1 °C (98.8 °F) 65 12 (!) 143/99      Pulse Ox Temp Source Heart Rate Source Patient Position   98 % Temporal -- Sitting      BP Location FiO2 (%)     Left arm --       Physical Exam  GENERAL APPEARANCE: Awake and alert. No acute respiratory distress.   VITAL SIGNS: As per the nurses' triage record.  HEENT: Normocephalic, atraumatic. Extraocular muscles are intact. Conjunctiva are pink. Negative scleral icterus. Mucous membranes are moist. Tongue in the midline. Oropharynx clear, uvula midline.   NECK: Soft, nontender and supple, full gross ROM, no meningeal signs.  CHEST: Nontender to palpation. Clear to auscultation bilaterally. Symmetric rise and fall of chest wall.   HEART: Clear S1 and S2. Regular rate and rhythm. Strong and equal pulses in the extremities.  ABDOMEN: Soft, nontender, nondistended  MUSCULOSKELETAL: The calves are nontender to palpation. Full gross active range of motion. Ambulating on own with no acute difficulties.  Patient does have wrist drop of the right wrist with difficulty extending his right wrist.  Digits of the right hand are held in flexed position with difficulty extending the digits.  He also has difficulty flexing at  his right elbow.  Full strength is intact with extending the right elbow and flexing the right wrist.  Sensation is intact over upper and lower extremities bilaterally.  NEUROLOGICAL: Awake, alert and oriented x 3. Motor power intact in the upper and lower extremities. Sensation is intact to light touch in the upper and lower extremities. Patient answering questions appropriately.  NIH stroke scale 2 due to weakness of the right upper extremity.  IMMUNOLOGICAL: No lymphatic streaking noted  DERMATOLOGIC: Warm and dry without petechiae, rashes, or ecchymosis noted on visible skin.   PYSCH: Cooperative with appropriate mood and affect.    ED Course & MDM   ED Course as of 11/18/24 1624   Mon Nov 18, 2024   1124 EKG Time:1123  EKG Interpretation time:1124  EKG Interpretation: EKG shows normal sinus rhythm with a right bundle branch block with a rate of 60 bpm, normal axis, no evidence of STEMI.    EKG was interpreted by myself independently [JL]   1607 I did speak with neurology, Dr. Phoebe Oneal. [SH]      ED Course User Index  [JL] Cirilo Ahmadi DO  [] Jaci Wilcox PA-C         Diagnoses as of 11/18/24 1624   Acute radial nerve palsy of right upper extremity                 No data recorded     Rusty Coma Scale Score: 15 (11/18/24 1118 : Shanae De La Cruz RN)       NIH Stroke Scale: 2 (11/18/24 1144 : Jaci Wilcox PA-C)                   Medical Decision Making  Parts of this chart have been completed using voice recognition software. Please excuse any errors of transcription.  My thought process and reason for plan has been formulated from the time that I saw the patient until the time of disposition and is not specific to one specific moment during their visit and furthermore my MDM encompasses this entire chart and not only this text box.      HPI: Detailed above.    Exam: A medically appropriate exam performed, outlined above, given the known history and presentation.    History obtained from: Patient  and patient's wife    EKG: See my supervising physician's EKG interpretation    Medications given during visit:  Medications   morphine injection 4 mg (4 mg intravenous Given 11/18/24 1340)        Diagnostic/tests  Labs Reviewed   CBC WITH AUTO DIFFERENTIAL - Abnormal       Result Value    WBC 3.9 (*)     nRBC 0.0      RBC 4.17 (*)     Hemoglobin 13.2 (*)     Hematocrit 39.4 (*)     MCV 95      MCH 31.7      MCHC 33.5      RDW 14.2      Platelets 180      Neutrophils % 51.6      Immature Granulocytes %, Automated 0.3      Lymphocytes % 36.8      Monocytes % 6.4      Eosinophils % 3.1      Basophils % 1.8      Neutrophils Absolute 2.02      Immature Granulocytes Absolute, Automated 0.01      Lymphocytes Absolute 1.44      Monocytes Absolute 0.25      Eosinophils Absolute 0.12      Basophils Absolute 0.07     COMPREHENSIVE METABOLIC PANEL - Abnormal    Glucose 89      Sodium 138      Potassium 3.6      Chloride 108 (*)     Bicarbonate 25      Anion Gap 9 (*)     Urea Nitrogen 13      Creatinine 0.93      eGFR 88      Calcium 8.7      Albumin 3.6      Alkaline Phosphatase 81      Total Protein 6.2 (*)     AST 17      Bilirubin, Total 0.4      ALT 18     PROTIME-INR - Normal    Protime 11.9      INR 1.1      Narrative:     INR Therapeutic Range: 2.0-3.5   APTT - Normal    aPTT 30.1     URINALYSIS WITH REFLEX CULTURE AND MICROSCOPIC - Normal    Color, Urine Light-Yellow      Appearance, Urine Clear      Specific Gravity, Urine 1.017      pH, Urine 6.0      Protein, Urine NEGATIVE      Glucose, Urine Normal      Blood, Urine NEGATIVE      Ketones, Urine NEGATIVE      Bilirubin, Urine NEGATIVE      Urobilinogen, Urine Normal      Nitrite, Urine NEGATIVE      Leukocyte Esterase, Urine NEGATIVE     SERIAL TROPONIN-INITIAL - Normal    Troponin I, High Sensitivity 3      Narrative:     Less than 99th percentile of normal range cutoff-  Female and children under 18 years old <14 ng/L; Male <21 ng/L: Negative  Repeat testing  should be performed if clinically indicated.     Female and children under 18 years old 14-50 ng/L; Male 21-50 ng/L:  Consistent with possible cardiac damage and possible increased clinical   risk. Serial measurements may help to assess extent of myocardial damage.     >50 ng/L: Consistent with cardiac damage, increased clinical risk and  myocardial infarction. Serial measurements may help assess extent of   myocardial damage.      NOTE: Children less than 1 year old may have higher baseline troponin   levels and results should be interpreted in conjunction with the overall   clinical context.     NOTE: Troponin I testing is performed using a different   testing methodology at [facility] than at other   [facility]. Direct result comparisons should only   be made within the same method.   SERIAL TROPONIN, 1 HOUR - Normal    Troponin I, High Sensitivity 3      Narrative:     Less than 99th percentile of normal range cutoff-  Female and children under 18 years old <14 ng/L; Male <21 ng/L: Negative  Repeat testing should be performed if clinically indicated.     Female and children under 18 years old 14-50 ng/L; Male 21-50 ng/L:  Consistent with possible cardiac damage and possible increased clinical   risk. Serial measurements may help to assess extent of myocardial damage.     >50 ng/L: Consistent with cardiac damage, increased clinical risk and  myocardial infarction. Serial measurements may help assess extent of   myocardial damage.      NOTE: Children less than 1 year old may have higher baseline troponin   levels and results should be interpreted in conjunction with the overall   clinical context.     NOTE: Troponin I testing is performed using a different   testing methodology at [facility] than at other   [facility]. Direct result comparisons should only   be made within the same method.   TROPONIN SERIES- (INITIAL, 1 HR)    Narrative:     The following orders were created  for panel order Troponin I Series, High Sensitivity (0, 1 HR).  Procedure                               Abnormality         Status                     ---------                               -----------         ------                     Troponin I, High Sensiti...[[id number]]  Normal              Final result               Troponin, High Sensitivi...[[id number]]  Normal              Final result                 Please view results for these tests on the individual orders.   URINALYSIS WITH REFLEX CULTURE AND MICROSCOPIC    Narrative:     The following orders were created for panel order Urinalysis with Reflex Culture and Microscopic.  Procedure                               Abnormality         Status                     ---------                               -----------         ------                     Urinalysis with Reflex C...[[id number]]  Normal              Final result               Extra Urine Gray Tube[[id number]]                            In process                   Please view results for these tests on the individual orders.   EXTRA URINE GRAY TUBE      CT head wo IV contrast   Final Result   No acute intracranial pathology.             Signed by: Pipo Lerma 11/18/2024 2:03 PM   Dictation workstation:   [id number]      XR chest 1 view   Final Result   1.  No evidence of acute cardiopulmonary process.                  MACRO:   None        Signed by: Codey Hoffmann 11/18/2024 12:06 PM   Dictation workstation:   [id number]           Considerations/further MDM:  Patient was seen in conjucntion with my supervising physician,  Dr. Ahmadi. Please refer to his note.    Differential diagnosis includes was not limited to CVA versus nerve palsy    Physical exam findings are more consistent with a wrist drop/nerve palsy due to decreased ability to extend right wrist and extend digits of right hand.  Patient does have full strength with flexing right wrist and flexing digits of right hand.  CT head without IV  contrast shows no evidence of acute intracranial pathology.  Chest x-ray shows no evidence of acute cardiopulmonary process.  Laboratory evaluation is grossly unremarkable.  Very mild normocytic anemia and very mild leukopenia.  Initial troponin I 3, repeat troponin I 3.  I did speak with neurology in regards to physical exam findings and she does feel patient symptomatology and physical exam findings are more consistent with a nerve palsy.  Recommended discharge home with neurology follow-up.  I did put in a stat referral to Dr. Phoebe Oneal.  Patient was given strict return precautions to the emergency department he felt comfortable discharge plan home.  He was discharged in stable condition.    Procedure  Procedures     Jaci Wilcox PA-C  11/18/24 3793

## 2024-11-18 NOTE — ED TRIAGE NOTES
Pt to ED for weakness in the right arm that began on Friday. Pt also reported pain in the arm described as dull with moments of stabbing.

## 2024-11-18 NOTE — ED PROVIDER NOTES
Patient was seen by both myself and advanced practitioner.  I personally saw the patient and made/approved the management plan and take responsibility for the patient management     Patient is a 69-year-old male that presents to the emergency department for evaluation of right upper extremity weakness.  Patient states that symptoms for started 3 days ago.  He started noticed he had significant weakness in trying to lift his right arm as well as severe weakness in his hand and wrist.  He states that since that time he has been having difficulty trying to open his hand as well as extend his wrist.  He does admit to some intermittent pains however states that has been ongoing issue for the last several months including aching pain across the chest.  Patient states that he went to his primary care physician's office today to get evaluated and was sent to the emergency room for evaluation of possible stroke.    On exam patient uncomfortable appearing but in no obvious distress.  He is awake, alert and oriented.  He has 2+ radial pulses bilaterally.  He does have normal sensation of the upper extremities bilaterally.  Cranials 2 through 12 are intact.  Patient does have significant weakness in flexion of the right upper extremity at the bicep, extension of the right wrist, extension of the hands and fingers.  He does have normal strength in flexion of the wrist, flexion of the fingers and is able to make a fist without difficulty.  Patient has an NIH stroke score of 1 due to drift in the right upper extremity.  He is well outside of any window for tPA/TNK and is not a candidate for thrombectomy.  CT scan of the brain shows no evidence of intracranial hemorrhage or acute stroke.  Chest x-ray shows no evidence of pneumonia, pneumothorax, wide mediastinum.  EKG shows normal sinus rhythm with no evidence of STEMI or active ischemia.  Blood work was unremarkable including electrolytes, normal kidney function.  He does have  slight leukopenia with a white count of 3.9 but remainder of blood work unremarkable.  Physician assistant did discuss case with neurologist Dr. Oneal who recommends close outpatient follow-up with neurology as they agree that is more consistent with a likely nerve palsy.  Return precautions were discussed with patient.    I independently interpreted the following study(s) CT scan of the brain, chest x-ray which show chest x-ray was clear showing no evidence of pneumonia, pneumothorax and wide mediastinum.  CT scan of the brain shows no evidence of intracranial hemorrhage or acute stroke.  EKG shows normal sinus rhythm with a right bundle branch block with no evidence of STEMI.         Cirilo Ahmadi, DO  11/21/24 0618

## 2024-11-18 NOTE — PROGRESS NOTES
Subjective   Patient ID: Davi Sol is a 69 y.o. male who presents for Follow-up.    Here today to follow-up recent labs and imaging.    Still having right sided chest and shoulder pain.    Woke up on Friday morning with profound weakness and paralysis of his right hand.  Did not seek care at that time because he thought it would be a stroke since he does not have high blood pressure.  He still has markedly decreased movement of his right hand and arm.        Review of Systems    Objective     Visit Vitals  /64 (BP Location: Left arm)   Pulse 65   Temp 36.7 °C (98 °F) (Temporal)   Wt 59 kg (130 lb)   SpO2 96%   BMI 19.48 kg/m²   Smoking Status Every Day   BSA 1.69 m²       Wt Readings from Last 5 Encounters:   11/18/24 59 kg (130 lb)   10/21/24 57.2 kg (126 lb)   10/11/24 57.6 kg (127 lb)   08/12/24 61.2 kg (135 lb)   07/24/24 60.8 kg (134 lb)     NIHSS = 1 (pronator drift)  Physical Exam  Constitutional:       Appearance: Normal appearance. He is ill-appearing.   HENT:      Head: Normocephalic and atraumatic.      Right Ear: Tympanic membrane, ear canal and external ear normal.      Left Ear: Tympanic membrane, ear canal and external ear normal.      Nose: Nose normal.      Mouth/Throat:      Mouth: Mucous membranes are moist.      Pharynx: Oropharynx is clear.   Eyes:      Extraocular Movements: Extraocular movements intact.      Conjunctiva/sclera: Conjunctivae normal.      Pupils: Pupils are equal, round, and reactive to light.   Cardiovascular:      Rate and Rhythm: Normal rate and regular rhythm.      Pulses: Normal pulses.      Heart sounds: Normal heart sounds.   Pulmonary:      Effort: Pulmonary effort is normal.      Breath sounds: Normal breath sounds.   Chest:      Chest wall: Tenderness present.   Abdominal:      General: There is no distension.      Palpations: Abdomen is soft.      Tenderness: There is no abdominal tenderness.   Skin:     General: Skin is warm and dry.   Neurological:       Mental Status: He is alert and oriented to person, place, and time. Mental status is at baseline.      Cranial Nerves: Cranial nerves 2-12 are intact. No cranial nerve deficit.      Sensory: No sensory deficit.      Motor: Weakness and pronator drift present.      Gait: Gait abnormal.      Comments: Strength is grossly 3 out of 5 throughout right upper extremity as compared to 5 out of 5 throughout left upper extremity.  Strength is grossly 4 out of 5 throughout right lower extremity as compared to 5 out of 5 throughout left lower extremity.   Psychiatric:         Mood and Affect: Mood normal.         Behavior: Behavior normal.            Assessment & Plan  Right arm weakness       Symptoms are concerning for possible CVA.  Event in question would have happened on Friday.  I did contact his neurologist who recommended that he be seen immediately at the ER.  Report was called to [facility] ER.  Neuropathy    Orders:    Disability Adiel    Chorea    Orders:    Disability Placard       Due to potential acuity of patient's condition, he was referred to [facility] emergency department for further care.  Report was called to the emergency department.

## 2024-11-19 LAB — HOLD SPECIMEN: NORMAL

## 2024-11-21 LAB
ATRIAL RATE: 60 BPM
P AXIS: 39 DEGREES
P OFFSET: 203 MS
P ONSET: 148 MS
PR INTERVAL: 142 MS
Q ONSET: 219 MS
QRS COUNT: 10 BEATS
QRS DURATION: 152 MS
QT INTERVAL: 438 MS
QTC CALCULATION(BAZETT): 438 MS
QTC FREDERICIA: 438 MS
R AXIS: 87 DEGREES
T AXIS: 73 DEGREES
T OFFSET: 438 MS
VENTRICULAR RATE: 60 BPM

## 2024-12-06 ENCOUNTER — TELEPHONE (OUTPATIENT)
Dept: PRIMARY CARE | Facility: CLINIC | Age: 69
End: 2024-12-06
Payer: MEDICARE

## 2024-12-06 DIAGNOSIS — G62.9 NEUROPATHY: ICD-10-CM

## 2024-12-06 DIAGNOSIS — M19.019 SHOULDER ARTHRITIS: ICD-10-CM

## 2024-12-06 RX ORDER — AMANTADINE HYDROCHLORIDE 100 MG/1
100 CAPSULE, GELATIN COATED ORAL 2 TIMES DAILY
Qty: 60 CAPSULE | Refills: 5 | Status: SHIPPED | OUTPATIENT
Start: 2024-12-06 | End: 2025-12-06

## 2024-12-06 RX ORDER — GABAPENTIN 400 MG/1
800 CAPSULE ORAL 4 TIMES DAILY
Qty: 478 CAPSULE | Refills: 0 | Status: SHIPPED | OUTPATIENT
Start: 2024-12-06

## 2024-12-06 NOTE — TELEPHONE ENCOUNTER
Refill needed for    Gabapentin 400 MG    Amantadine 100 MG    Pharmacy is [facility]    Patient can be reached at [phone]

## 2024-12-07 DIAGNOSIS — G62.9 NEUROPATHY: ICD-10-CM

## 2024-12-10 RX ORDER — GABAPENTIN 400 MG/1
CAPSULE ORAL
Qty: 400 CAPSULE | Refills: 0 | OUTPATIENT
Start: 2024-12-10

## 2024-12-12 ENCOUNTER — APPOINTMENT (OUTPATIENT)
Dept: UROLOGY | Facility: CLINIC | Age: 69
End: 2024-12-12
Payer: MEDICARE

## 2024-12-16 DIAGNOSIS — R35.0 BENIGN PROSTATIC HYPERPLASIA WITH URINARY FREQUENCY: ICD-10-CM

## 2024-12-16 DIAGNOSIS — N40.1 BENIGN PROSTATIC HYPERPLASIA WITH URINARY FREQUENCY: ICD-10-CM

## 2024-12-16 RX ORDER — SOLIFENACIN SUCCINATE 5 MG/1
5 TABLET, FILM COATED ORAL DAILY
Qty: 30 TABLET | Refills: 0 | Status: SHIPPED | OUTPATIENT
Start: 2024-12-16 | End: 2024-12-18 | Stop reason: SDUPTHER

## 2024-12-18 ENCOUNTER — APPOINTMENT (OUTPATIENT)
Dept: UROLOGY | Facility: CLINIC | Age: 69
End: 2024-12-18
Payer: MEDICARE

## 2024-12-18 DIAGNOSIS — R39.11 BENIGN PROSTATIC HYPERPLASIA WITH URINARY HESITANCY: Primary | ICD-10-CM

## 2024-12-18 DIAGNOSIS — R35.0 BENIGN PROSTATIC HYPERPLASIA WITH URINARY FREQUENCY: ICD-10-CM

## 2024-12-18 DIAGNOSIS — N40.1 BENIGN PROSTATIC HYPERPLASIA WITH URINARY FREQUENCY: ICD-10-CM

## 2024-12-18 DIAGNOSIS — N40.1 BENIGN PROSTATIC HYPERPLASIA WITH URINARY HESITANCY: Primary | ICD-10-CM

## 2024-12-18 PROCEDURE — 99212 OFFICE O/P EST SF 10 MIN: CPT | Performed by: SURGERY

## 2024-12-18 PROCEDURE — 1159F MED LIST DOCD IN RCRD: CPT | Performed by: SURGERY

## 2024-12-18 PROCEDURE — G2211 COMPLEX E/M VISIT ADD ON: HCPCS | Performed by: SURGERY

## 2024-12-18 PROCEDURE — 1123F ACP DISCUSS/DSCN MKR DOCD: CPT | Performed by: SURGERY

## 2024-12-18 PROCEDURE — 1160F RVW MEDS BY RX/DR IN RCRD: CPT | Performed by: SURGERY

## 2024-12-18 PROCEDURE — 4004F PT TOBACCO SCREEN RCVD TLK: CPT | Performed by: SURGERY

## 2024-12-18 RX ORDER — SOLIFENACIN SUCCINATE 5 MG/1
5 TABLET, FILM COATED ORAL DAILY
Qty: 90 TABLET | Refills: 1 | Status: SHIPPED | OUTPATIENT
Start: 2024-12-18

## 2024-12-18 NOTE — PROGRESS NOTES
I, Dr. Jc Rodriguez, saw this patient via Telehealth today.  The details of the visit are listed below.  This visit was done via phone only.        History of Present Illness (HPI):  TODAY (12/18/24)  Davi Sol is a 69 y.o. male presenting virtually for BPH.  He has been on Flomax for some time.  Last month he started taking Vesicare for his urgency symptoms.  This seems to have helped.  He has less episodes.  His stream is fair.  He feels empty most times.  Overall he is fairly satisfied.    Past Medical History:   Diagnosis Date    Adverse effect of anesthesia     Arthritis     BPH with elevated PSA     Current every day smoker     Depression     Fibromyalgia     GERD (gastroesophageal reflux disease)     History of peritonitis     Hyperlipidemia     Insomnia     Movement disorder     Rule out Benny's chorea versus Parkinson's    Osteoporosis     Postlaminectomy syndrome     TIA (transient ischemic attack) 2005     Past Surgical History:   Procedure Laterality Date    BACK SURGERY      X 3    CHOLECYSTECTOMY  2022    COLONOSCOPY W/ BIOPSIES      ESOPHAGOGASTRODUODENOSCOPY      HERNIA REPAIR Bilateral 07/25/2024    MEDIPORT INSERTION, SINGLE      Has not been flushed or used in 14 years;    OTHER SURGICAL HISTORY      intrathecal  pain pump implant    PENILE PROSTHESIS IMPLANT      SHOULDER ARTHROSCOPY Bilateral     SPINAL CORD STIMULATOR IMPLANT      X 2.  Removal for infection both times    TOTAL KNEE ARTHROPLASTY Left     TOTAL SHOULDER ARTHROPLASTY Left 2024     Family History   Problem Relation Name Age of Onset    Dementia Mother      Mental illness Mother      Heart attack Father      Diabetes Father      Other (brain tumor) Father      Heart disease Father      Cancer Maternal Grandfather      Leukemia Other      Pancreatic cancer Other       Social History     Tobacco Use   Smoking Status Every Day    Current packs/day: 0.50    Average packs/day: 0.5 packs/day for 53.0 years (26.5 ttl  pk-yrs)    Types: Cigarettes    Start date: 1972    Passive exposure: Current   Smokeless Tobacco Never     Current Outpatient Medications   Medication Sig Dispense Refill    acetaminophen (Tylenol) 500 mg tablet Take 2 tablets (1,000 mg) by mouth every 6 hours if needed for mild pain (1 - 3). 30 tablet 0    amantadine (Symmetrel) 100 mg capsule Take 1 capsule (100 mg) by mouth 2 times a day. 60 capsule 5    amitriptyline (Elavil) 10 mg tablet TAKE 1 TABLET BY MOUTH AT BEDTIME 100 tablet 1    atorvastatin (Lipitor) 20 mg tablet TAKE ONE TABLET BY MOUTH ONCE DAILY 90 tablet 1    cholecalciferol (Vitamin D3) 25 MCG (1000 UT) tablet Take 1 tablet (1,000 Units) by mouth once daily.      escitalopram (Lexapro) 5 mg tablet TAKE 1 TABLET (5 MG) BY MOUTH ONCE DAILY. 100 tablet 1    gabapentin (Neurontin) 400 mg capsule Take 2 capsules (800 mg) by mouth 4 times a day. 478 capsule 0    ibuprofen 200 mg tablet Take 1 tablet (200 mg) by mouth every 6 hours if needed for mild pain (1 - 3).      lidocaine (Lidoderm) 5 % patch Place 1 patch over 12 hours on the skin once daily. Apply to painful area 12 hours per day, remove for 12 hours. 30 patch 11    Pump Patient Supplied Medication Inject under the skin continuously. Patient Own Pump    Meds: morphine and lidocaine  Provider name/ #:   Pump name: [facility]  Last & next fill date: next fill date is the 1st week in April      solifenacin (VESIcare) 5 mg tablet Take 1 tablet (5 mg) by mouth once daily. Swallow tablet whole; do not crush, chew, or split. 30 tablet 0    tamsulosin (Flomax) 0.4 mg 24 hr capsule Take 1 capsule (0.4 mg) by mouth once daily in the evening. 30 capsule 0    tamsulosin (Flomax) 0.4 mg 24 hr capsule Take 1 capsule (0.4 mg) by mouth once daily. 90 capsule 1    tiZANidine (Zanaflex) 2 mg tablet Take 1 tablet (2 mg) by mouth every 6 hours if needed for muscle spasms. 30 tablet 0    topiramate (Topamax) 100 mg tablet Take 1 tablet (100 mg) by mouth 2 times a  day. 180 tablet 3    traZODone (Desyrel) 150 mg tablet TAKE 1 TABLET BY MOUTH AT BEDTIME AS NEEDED FOR SLEEP 100 tablet 1     No current facility-administered medications for this visit.     Allergies   Allergen Reactions    Adhesive Tape-Silicones Unknown     Reactions: Blistering    Cephalexin Unknown    Clindamycin Unknown    Linezolid Unknown    Adhesive Rash     water blisters    Penicillins Rash and Swelling     chest swelling     Past medical, surgical, family and social history in the chart was reviewed and is accurate including any additions to what is in this HPI.    Review of systems (ROS):   Pertinent information as listed in the HPI.     Objective   There were no vitals taken for this visit.  PHYSICAL EXAM:  Exam limited 2/2 telehealth visit.         ASSESSMENT:  Problem List Items Addressed This Visit    None  Visit Diagnoses       Benign prostatic hyperplasia with urinary hesitancy    -  Primary           PLAN:  At this point he is clinically improved.  For now he will remain on Flomax and Vesicare.  I will refill his Vesicare today.  He will return and see me in 6 months.    All questions were answered to the patient’s satisfaction.  Patient agrees with the plan and wishes to proceed.  Follow-up for ongoing care of chronic medical conditions.  Follow-up will be scheduled appropriately.     Jc Rodriguez MD

## 2024-12-23 ENCOUNTER — HOSPITAL ENCOUNTER (OUTPATIENT)
Dept: RADIOLOGY | Facility: HOSPITAL | Age: 69
Discharge: HOME | End: 2024-12-23
Payer: MEDICARE

## 2024-12-23 DIAGNOSIS — R63.4 ABNORMAL WEIGHT LOSS: ICD-10-CM

## 2024-12-23 PROCEDURE — 2550000001 HC RX 255 CONTRASTS: Performed by: PSYCHIATRY & NEUROLOGY

## 2024-12-23 PROCEDURE — 74177 CT ABD & PELVIS W/CONTRAST: CPT

## 2025-01-06 ENCOUNTER — APPOINTMENT (OUTPATIENT)
Dept: RADIOLOGY | Facility: HOSPITAL | Age: 70
End: 2025-01-06
Payer: MEDICARE

## 2025-01-09 ENCOUNTER — HOSPITAL ENCOUNTER (OUTPATIENT)
Dept: RADIOLOGY | Facility: HOSPITAL | Age: 70
Discharge: HOME | End: 2025-01-09
Payer: MEDICARE

## 2025-01-09 DIAGNOSIS — M21.331 WRIST DROP, RIGHT WRIST: ICD-10-CM

## 2025-01-09 DIAGNOSIS — M25.511 PAIN IN RIGHT SHOULDER: ICD-10-CM

## 2025-01-09 PROCEDURE — 73221 MRI JOINT UPR EXTREM W/O DYE: CPT | Mod: RT

## 2025-01-13 ENCOUNTER — HOSPITAL ENCOUNTER (OUTPATIENT)
Dept: RADIOLOGY | Facility: HOSPITAL | Age: 70
Discharge: HOME | End: 2025-01-13
Payer: MEDICARE

## 2025-01-13 DIAGNOSIS — M19.011 PRIMARY OSTEOARTHRITIS, RIGHT SHOULDER: ICD-10-CM

## 2025-01-13 PROCEDURE — 73200 CT UPPER EXTREMITY W/O DYE: CPT | Mod: RIGHT SIDE | Performed by: RADIOLOGY

## 2025-01-13 PROCEDURE — 73200 CT UPPER EXTREMITY W/O DYE: CPT | Mod: RT

## 2025-01-16 ENCOUNTER — LAB (OUTPATIENT)
Dept: LAB | Facility: LAB | Age: 70
End: 2025-01-16
Payer: MEDICARE

## 2025-01-16 ENCOUNTER — APPOINTMENT (OUTPATIENT)
Dept: LAB | Facility: LAB | Age: 70
End: 2025-01-16
Payer: MEDICARE

## 2025-01-16 ENCOUNTER — PRE-ADMISSION TESTING (OUTPATIENT)
Dept: PREADMISSION TESTING | Facility: HOSPITAL | Age: 70
End: 2025-01-16
Payer: MEDICARE

## 2025-01-16 VITALS
DIASTOLIC BLOOD PRESSURE: 80 MMHG | BODY MASS INDEX: 19.99 KG/M2 | HEART RATE: 62 BPM | RESPIRATION RATE: 16 BRPM | WEIGHT: 135 LBS | SYSTOLIC BLOOD PRESSURE: 139 MMHG | TEMPERATURE: 97.9 F | HEIGHT: 69 IN | OXYGEN SATURATION: 100 %

## 2025-01-16 DIAGNOSIS — R73.09 ABNORMAL GLUCOSE: ICD-10-CM

## 2025-01-16 DIAGNOSIS — G25.5 OTHER CHOREA: Primary | ICD-10-CM

## 2025-01-16 DIAGNOSIS — Z01.818 PRE-OP TESTING: Primary | ICD-10-CM

## 2025-01-16 LAB
ANION GAP SERPL CALCULATED.3IONS-SCNC: 8 MMOL/L (ref 10–20)
BASOPHILS # BLD AUTO: 0.07 X10*3/UL (ref 0–0.1)
BASOPHILS NFR BLD AUTO: 1.3 %
BUN SERPL-MCNC: 11 MG/DL (ref 6–23)
CALCIUM SERPL-MCNC: 8.7 MG/DL (ref 8.6–10.3)
CHLORIDE SERPL-SCNC: 109 MMOL/L (ref 98–107)
CO2 SERPL-SCNC: 26 MMOL/L (ref 21–32)
CREAT SERPL-MCNC: 0.85 MG/DL (ref 0.5–1.3)
EGFRCR SERPLBLD CKD-EPI 2021: >90 ML/MIN/1.73M*2
EOSINOPHIL # BLD AUTO: 0.16 X10*3/UL (ref 0–0.7)
EOSINOPHIL NFR BLD AUTO: 2.9 %
ERYTHROCYTE [DISTWIDTH] IN BLOOD BY AUTOMATED COUNT: 13.2 % (ref 11.5–14.5)
EST. AVERAGE GLUCOSE BLD GHB EST-MCNC: 105 MG/DL
GLUCOSE SERPL-MCNC: 88 MG/DL (ref 74–99)
HBA1C MFR BLD: 5.3 %
HCT VFR BLD AUTO: 42.4 % (ref 41–52)
HGB BLD-MCNC: 13.9 G/DL (ref 13.5–17.5)
IMM GRANULOCYTES # BLD AUTO: 0.02 X10*3/UL (ref 0–0.7)
IMM GRANULOCYTES NFR BLD AUTO: 0.4 % (ref 0–0.9)
LYMPHOCYTES # BLD AUTO: 1.28 X10*3/UL (ref 1.2–4.8)
LYMPHOCYTES NFR BLD AUTO: 23.3 %
MCH RBC QN AUTO: 31 PG (ref 26–34)
MCHC RBC AUTO-ENTMCNC: 32.8 G/DL (ref 32–36)
MCV RBC AUTO: 94 FL (ref 80–100)
MONOCYTES # BLD AUTO: 0.42 X10*3/UL (ref 0.1–1)
MONOCYTES NFR BLD AUTO: 7.6 %
NEUTROPHILS # BLD AUTO: 3.55 X10*3/UL (ref 1.2–7.7)
NEUTROPHILS NFR BLD AUTO: 64.5 %
NRBC BLD-RTO: 0 /100 WBCS (ref 0–0)
PLATELET # BLD AUTO: 167 X10*3/UL (ref 150–450)
POTASSIUM SERPL-SCNC: 3.7 MMOL/L (ref 3.5–5.3)
PROT SERPL-MCNC: 6.6 G/DL (ref 6.4–8.2)
RBC # BLD AUTO: 4.49 X10*6/UL (ref 4.5–5.9)
SODIUM SERPL-SCNC: 139 MMOL/L (ref 136–145)
WBC # BLD AUTO: 5.5 X10*3/UL (ref 4.4–11.3)

## 2025-01-16 PROCEDURE — 85025 COMPLETE CBC W/AUTO DIFF WBC: CPT

## 2025-01-16 PROCEDURE — 99204 OFFICE O/P NEW MOD 45 MIN: CPT | Performed by: PHYSICIAN ASSISTANT

## 2025-01-16 PROCEDURE — 36415 COLL VENOUS BLD VENIPUNCTURE: CPT

## 2025-01-16 PROCEDURE — 80048 BASIC METABOLIC PNL TOTAL CA: CPT

## 2025-01-16 PROCEDURE — 84165 PROTEIN E-PHORESIS SERUM: CPT

## 2025-01-16 PROCEDURE — 83036 HEMOGLOBIN GLYCOSYLATED A1C: CPT | Mod: TRILAB

## 2025-01-16 PROCEDURE — 86334 IMMUNOFIX E-PHORESIS SERUM: CPT

## 2025-01-16 PROCEDURE — 84155 ASSAY OF PROTEIN SERUM: CPT

## 2025-01-16 PROCEDURE — 87081 CULTURE SCREEN ONLY: CPT | Mod: TRILAB

## 2025-01-16 RX ORDER — CHLORHEXIDINE GLUCONATE ORAL RINSE 1.2 MG/ML
SOLUTION DENTAL
Qty: 473 ML | Refills: 0 | Status: SHIPPED | OUTPATIENT
Start: 2025-01-16 | End: 2025-01-31

## 2025-01-16 ASSESSMENT — DUKE ACTIVITY SCORE INDEX (DASI)
CAN YOU DO HEAVY WORK AROUND THE HOUSE LIKE SCRUBBING FLOORS OR LIFTING AND MOVING HEAVY FURNITURE: NO
CAN YOU HAVE SEXUAL RELATIONS: YES
DASI METS SCORE: 6.3
CAN YOU WALK A BLOCK OR TWO ON LEVEL GROUND: YES
TOTAL_SCORE: 28.7
CAN YOU RUN A SHORT DISTANCE: NO
CAN YOU DO YARD WORK LIKE RAKING LEAVES, WEEDING OR PUSHING A MOWER: YES
CAN YOU DO MODERATE WORK AROUND THE HOUSE LIKE VACUUMING, SWEEPING FLOORS OR CARRYING GROCERIES: YES
CAN YOU TAKE CARE OF YOURSELF (EAT, DRESS, BATHE, OR USE TOILET): YES
CAN YOU CLIMB A FLIGHT OF STAIRS OR WALK UP A HILL: YES
CAN YOU PARTICIPATE IN MODERATE RECREATIONAL ACTIVITIES LIKE GOLF, BOWLING, DANCING, DOUBLES TENNIS OR THROWING A BASEBALL OR FOOTBALL: NO
CAN YOU WALK INDOORS, SUCH AS AROUND YOUR HOUSE: YES
CAN YOU PARTICIPATE IN STRENOUS SPORTS LIKE SWIMMING, SINGLES TENNIS, FOOTBALL, BASKETBALL, OR SKIING: NO
CAN YOU DO LIGHT WORK AROUND THE HOUSE LIKE DUSTING OR WASHING DISHES: YES

## 2025-01-16 ASSESSMENT — ENCOUNTER SYMPTOMS
RESPIRATORY NEGATIVE: 1
PSYCHIATRIC NEGATIVE: 1
NEUROLOGICAL NEGATIVE: 1
GASTROINTESTINAL NEGATIVE: 1
CARDIOVASCULAR NEGATIVE: 1
TROUBLE SWALLOWING: 1
ARTHRALGIAS: 1
CONSTITUTIONAL NEGATIVE: 1
EYES NEGATIVE: 1

## 2025-01-16 NOTE — H&P (VIEW-ONLY)
"CPM/PAT Evaluation       Name: Davi Bateshemanth (Davi TORRES Alekseyhemanthamos)  /Age: 1955/69 y.o.     In-Person       Chief Complaint: \"shoulder pain\"    HPI  The patient is a 69 year old male.  For more than 1 year he has experienced right shoulder pain with reaching and lifting.  The pain can occasionally radiate to the right side of his neck.  He has associated right arm weakness and numbness and tingling of the right arm and hand.  He has had cortisone injections in the past, but no recent injections.  He was seen by Dr. Michelle and surgical intervention is recommended.    Past Medical History:   Diagnosis Date    Anxiety     Arthritis     BPH with elevated PSA     Current every day smoker     Delayed emergence from general anesthesia     Depression     Fibromyalgia     GERD (gastroesophageal reflux disease)     History of peritonitis     Hyperlipidemia     Hypertension     Insomnia     Movement disorder     Chorea    Osteoporosis     Postlaminectomy syndrome     TIA (transient ischemic attack)        Past Surgical History:   Procedure Laterality Date    BACK SURGERY      X 3    CHOLECYSTECTOMY      COLONOSCOPY W/ BIOPSIES      ESOPHAGOGASTRODUODENOSCOPY      HERNIA REPAIR Bilateral 2024    Inguinal herniorrhaphy    MEDIPORT INSERTION, SINGLE  2010    MEDIPORT REMOVAL      OTHER SURGICAL HISTORY      intrathecal  pain pump implant    PENILE PROSTHESIS IMPLANT  2011    PENILE PROSTHESIS IMPLANT  2017    Remove penile implant and re-implant penile implant    SHOULDER ARTHROSCOPY Bilateral     SPINAL CORD STIMULATOR IMPLANT      X 2.  Removal for infection both times    TOTAL KNEE ARTHROPLASTY Left     TOTAL SHOULDER ARTHROPLASTY Left      Family History   Problem Relation Name Age of Onset    Dementia Mother      Mental illness Mother      Heart attack Father      Diabetes Father      Other (brain tumor) Father      Heart disease Father      Cancer Maternal Grandfather      Leukemia " Other      Pancreatic cancer Other       Social History     Tobacco Use    Smoking status: Every Day     Current packs/day: 0.50     Average packs/day: 0.5 packs/day for 53.0 years (26.5 ttl pk-yrs)     Types: Cigarettes     Start date: 1972     Passive exposure: Current    Smokeless tobacco: Never   Substance Use Topics    Alcohol use: Never     Social History     Substance and Sexual Activity   Drug Use Never    Types: Marijuana     Allergies   Allergen Reactions    Adhesive Tape-Silicones Unknown     Reactions: Blistering    Cephalexin Unknown    Clindamycin Unknown    Linezolid Unknown    Adhesive Rash     water blisters    Penicillins Rash and Swelling     chest swelling     Current Outpatient Medications   Medication Sig Dispense Refill    amantadine (Symmetrel) 100 mg capsule Take 1 capsule (100 mg) by mouth 2 times a day. 60 capsule 5    amitriptyline (Elavil) 10 mg tablet TAKE 1 TABLET BY MOUTH AT BEDTIME 100 tablet 1    atorvastatin (Lipitor) 20 mg tablet TAKE ONE TABLET BY MOUTH ONCE DAILY 90 tablet 1    chlorhexidine (Peridex) 0.12 % solution Use as directed. 473 mL 0    cholecalciferol (Vitamin D3) 25 MCG (1000 UT) tablet Take 1 tablet (1,000 Units) by mouth once daily.      escitalopram (Lexapro) 5 mg tablet TAKE 1 TABLET (5 MG) BY MOUTH ONCE DAILY. 100 tablet 1    gabapentin (Neurontin) 400 mg capsule Take 2 capsules (800 mg) by mouth 4 times a day. 478 capsule 0    ibuprofen 200 mg tablet Take 1 tablet (200 mg) by mouth every 6 hours if needed for mild pain (1 - 3).      Pump Patient Supplied Medication Inject under the skin continuously. Patient Own Pump    Meds: morphine and lidocaine  Provider name/ #:   Pump name: [facility]  Last & next fill date: next fill date is the 1st week in April      solifenacin (VESIcare) 5 mg tablet Take 1 tablet (5 mg) by mouth once daily. Swallow tablet whole; do not crush, chew, or split. 90 tablet 1    tamsulosin (Flomax) 0.4 mg 24 hr capsule Take 1 capsule (0.4 mg)  "by mouth once daily in the evening. 30 capsule 0    tamsulosin (Flomax) 0.4 mg 24 hr capsule Take 1 capsule (0.4 mg) by mouth once daily. 90 capsule 1    tiZANidine (Zanaflex) 2 mg tablet Take 1 tablet (2 mg) by mouth every 6 hours if needed for muscle spasms. 30 tablet 0    topiramate (Topamax) 100 mg tablet Take 1 tablet (100 mg) by mouth 2 times a day. 180 tablet 3    traZODone (Desyrel) 150 mg tablet TAKE 1 TABLET BY MOUTH AT BEDTIME AS NEEDED FOR SLEEP 100 tablet 1     No current facility-administered medications for this visit.     Review of Systems   Constitutional: Negative.    HENT:  Positive for dental problem and trouble swallowing (with medications and dry foods).    Eyes: Negative.    Respiratory: Negative.     Cardiovascular: Negative.    Gastrointestinal: Negative.    Genitourinary:         Intermittent urinary hesitancy and urinary incontinence.   Musculoskeletal:  Positive for arthralgias.   Neurological: Negative.    Psychiatric/Behavioral: Negative.       /80   Pulse 62   Temp 36.6 °C (97.9 °F) (Temporal)   Resp 16   Ht 1.74 m (5' 8.5\")   Wt 61.2 kg (135 lb)   SpO2 100%   BMI 20.23 kg/m²     Physical Exam  Vitals reviewed.   Constitutional:       Comments: Frail appearing   HENT:      Head: Normocephalic and atraumatic.      Mouth/Throat:      Mouth: Mucous membranes are moist.      Pharynx: Oropharynx is clear.   Eyes:      Extraocular Movements: Extraocular movements intact.      Pupils: Pupils are equal, round, and reactive to light.      Comments: Glasses     Cardiovascular:      Rate and Rhythm: Normal rate and regular rhythm.      Heart sounds: Normal heart sounds.   Pulmonary:      Effort: Pulmonary effort is normal.      Breath sounds: Normal breath sounds.   Abdominal:      General: Bowel sounds are normal.      Palpations: Abdomen is soft.   Musculoskeletal:         General: No swelling.      Comments: Tenderness with palpation and limited range of motion noted right " shoulder.  Mildly limited range of motion left shoulder.    Skin:     General: Skin is warm and dry.      Comments: Pain pump in place left abdomen.   Neurological:      Mental Status: He is alert and oriented to person, place, and time.      Comments: Mild resting tremor noted.   Psychiatric:         Mood and Affect: Mood normal.         Behavior: Behavior normal.        PAT AIRWAY:   Airway:     Mallampati::  II    TM distance::  >3 FB    Neck ROM::  Limited   Edentulous      ASA:  III  DASI SCORE:  28.7  METS SCORE:  6.3  CHAD2 SCORE:  5.9%  REVISED CARDIAC RISK INDEX:  0.9%  STOP BANG SCORE:  3  CAPRINI DVT SCORE:  5  PARTH SCORE:  1.31%  ARISCAT SCORE:  1.6%    EKG 11/18/2024 - NORMAL SINUS RHYTHM, RBBB  CBC, BMP, MRSA, HEMOGLOBIN A1C ordered during PAT visit    Assessment and Plan:     Rotator cuff tear arthropathy of right shoulder:  Right reverse total shoulder arthroplasty  Hypertension - currently no medications  H/O TIA 2005, ? TIA 11/2024 - followed by Dr. Phoebe Oneal   H/O Movement Disorder - taking amantadine, gabapentin, tizanidine, topiramate   Smoker X 53 years    Kim Hillman PA-C    Neurology clearance given by Dr. Phoebe Oneal on 1/17/2025 - clearance scanned under MEDIA TAB

## 2025-01-16 NOTE — CPM/PAT H&P
"CPM/PAT Evaluation       Name: Davi Bateshemanth (Davi TORRES Alekseyhemanthamos)  /Age: 1955/69 y.o.     In-Person       Chief Complaint: \"shoulder pain\"    HPI  The patient is a 69 year old male.  For more than 1 year he has experienced right shoulder pain with reaching and lifting.  The pain can occasionally radiate to the right side of his neck.  He has associated right arm weakness and numbness and tingling of the right arm and hand.  He has had cortisone injections in the past, but no recent injections.  He was seen by Dr. Michelle and surgical intervention is recommended.    Past Medical History:   Diagnosis Date    Anxiety     Arthritis     BPH with elevated PSA     Current every day smoker     Delayed emergence from general anesthesia     Depression     Fibromyalgia     GERD (gastroesophageal reflux disease)     History of peritonitis     Hyperlipidemia     Hypertension     Insomnia     Movement disorder     Chorea    Osteoporosis     Postlaminectomy syndrome     TIA (transient ischemic attack)        Past Surgical History:   Procedure Laterality Date    BACK SURGERY      X 3    CHOLECYSTECTOMY      COLONOSCOPY W/ BIOPSIES      ESOPHAGOGASTRODUODENOSCOPY      HERNIA REPAIR Bilateral 2024    Inguinal herniorrhaphy    MEDIPORT INSERTION, SINGLE  2010    MEDIPORT REMOVAL      OTHER SURGICAL HISTORY      intrathecal  pain pump implant    PENILE PROSTHESIS IMPLANT  2011    PENILE PROSTHESIS IMPLANT  2017    Remove penile implant and re-implant penile implant    SHOULDER ARTHROSCOPY Bilateral     SPINAL CORD STIMULATOR IMPLANT      X 2.  Removal for infection both times    TOTAL KNEE ARTHROPLASTY Left     TOTAL SHOULDER ARTHROPLASTY Left      Family History   Problem Relation Name Age of Onset    Dementia Mother      Mental illness Mother      Heart attack Father      Diabetes Father      Other (brain tumor) Father      Heart disease Father      Cancer Maternal Grandfather      Leukemia " Patient is scheduled 1-31-18   Other      Pancreatic cancer Other       Social History     Tobacco Use    Smoking status: Every Day     Current packs/day: 0.50     Average packs/day: 0.5 packs/day for 53.0 years (26.5 ttl pk-yrs)     Types: Cigarettes     Start date: 1972     Passive exposure: Current    Smokeless tobacco: Never   Substance Use Topics    Alcohol use: Never     Social History     Substance and Sexual Activity   Drug Use Never    Types: Marijuana     Allergies   Allergen Reactions    Adhesive Tape-Silicones Unknown     Reactions: Blistering    Cephalexin Unknown    Clindamycin Unknown    Linezolid Unknown    Adhesive Rash     water blisters    Penicillins Rash and Swelling     chest swelling     Current Outpatient Medications   Medication Sig Dispense Refill    amantadine (Symmetrel) 100 mg capsule Take 1 capsule (100 mg) by mouth 2 times a day. 60 capsule 5    amitriptyline (Elavil) 10 mg tablet TAKE 1 TABLET BY MOUTH AT BEDTIME 100 tablet 1    atorvastatin (Lipitor) 20 mg tablet TAKE ONE TABLET BY MOUTH ONCE DAILY 90 tablet 1    chlorhexidine (Peridex) 0.12 % solution Use as directed. 473 mL 0    cholecalciferol (Vitamin D3) 25 MCG (1000 UT) tablet Take 1 tablet (1,000 Units) by mouth once daily.      escitalopram (Lexapro) 5 mg tablet TAKE 1 TABLET (5 MG) BY MOUTH ONCE DAILY. 100 tablet 1    gabapentin (Neurontin) 400 mg capsule Take 2 capsules (800 mg) by mouth 4 times a day. 478 capsule 0    ibuprofen 200 mg tablet Take 1 tablet (200 mg) by mouth every 6 hours if needed for mild pain (1 - 3).      Pump Patient Supplied Medication Inject under the skin continuously. Patient Own Pump    Meds: morphine and lidocaine  Provider name/ #:   Pump name: [facility]  Last & next fill date: next fill date is the 1st week in April      solifenacin (VESIcare) 5 mg tablet Take 1 tablet (5 mg) by mouth once daily. Swallow tablet whole; do not crush, chew, or split. 90 tablet 1    tamsulosin (Flomax) 0.4 mg 24 hr capsule Take 1 capsule (0.4 mg)  "by mouth once daily in the evening. 30 capsule 0    tamsulosin (Flomax) 0.4 mg 24 hr capsule Take 1 capsule (0.4 mg) by mouth once daily. 90 capsule 1    tiZANidine (Zanaflex) 2 mg tablet Take 1 tablet (2 mg) by mouth every 6 hours if needed for muscle spasms. 30 tablet 0    topiramate (Topamax) 100 mg tablet Take 1 tablet (100 mg) by mouth 2 times a day. 180 tablet 3    traZODone (Desyrel) 150 mg tablet TAKE 1 TABLET BY MOUTH AT BEDTIME AS NEEDED FOR SLEEP 100 tablet 1     No current facility-administered medications for this visit.     Review of Systems   Constitutional: Negative.    HENT:  Positive for dental problem and trouble swallowing (with medications and dry foods).    Eyes: Negative.    Respiratory: Negative.     Cardiovascular: Negative.    Gastrointestinal: Negative.    Genitourinary:         Intermittent urinary hesitancy and urinary incontinence.   Musculoskeletal:  Positive for arthralgias.   Neurological: Negative.    Psychiatric/Behavioral: Negative.       /80   Pulse 62   Temp 36.6 °C (97.9 °F) (Temporal)   Resp 16   Ht 1.74 m (5' 8.5\")   Wt 61.2 kg (135 lb)   SpO2 100%   BMI 20.23 kg/m²     Physical Exam  Vitals reviewed.   Constitutional:       Comments: Frail appearing   HENT:      Head: Normocephalic and atraumatic.      Mouth/Throat:      Mouth: Mucous membranes are moist.      Pharynx: Oropharynx is clear.   Eyes:      Extraocular Movements: Extraocular movements intact.      Pupils: Pupils are equal, round, and reactive to light.      Comments: Glasses     Cardiovascular:      Rate and Rhythm: Normal rate and regular rhythm.      Heart sounds: Normal heart sounds.   Pulmonary:      Effort: Pulmonary effort is normal.      Breath sounds: Normal breath sounds.   Abdominal:      General: Bowel sounds are normal.      Palpations: Abdomen is soft.   Musculoskeletal:         General: No swelling.      Comments: Tenderness with palpation and limited range of motion noted right " shoulder.  Mildly limited range of motion left shoulder.    Skin:     General: Skin is warm and dry.      Comments: Pain pump in place left abdomen.   Neurological:      Mental Status: He is alert and oriented to person, place, and time.      Comments: Mild resting tremor noted.   Psychiatric:         Mood and Affect: Mood normal.         Behavior: Behavior normal.        PAT AIRWAY:   Airway:     Mallampati::  II    TM distance::  >3 FB    Neck ROM::  Limited   Edentulous      ASA:  III  DASI SCORE:  28.7  METS SCORE:  6.3  CHAD2 SCORE:  5.9%  REVISED CARDIAC RISK INDEX:  0.9%  STOP BANG SCORE:  3  CAPRINI DVT SCORE:  5  PARTH SCORE:  1.31%  ARISCAT SCORE:  1.6%    EKG 11/18/2024 - NORMAL SINUS RHYTHM, RBBB  CBC, BMP, MRSA, HEMOGLOBIN A1C ordered during PAT visit    Assessment and Plan:     Rotator cuff tear arthropathy of right shoulder:  Right reverse total shoulder arthroplasty  Hypertension - currently no medications  H/O TIA 2005, ? TIA 11/2024 - followed by Dr. Phoebe Oneal - requesting neurology clearance  H/O Movement Disorder - taking amantadine, gabapentin, tizanidine, topiramate   Smoker X 53 years    Kim Hillman PA-C

## 2025-01-16 NOTE — PREPROCEDURE INSTRUCTIONS
Why must I stop eating and drinking near surgery time?  With sedation, food or liquid in your stomach can enter your lungs causing serious complications  Increases nausea and vomiting    When do I need to stop eating and drinking before my surgery?   Do not eat or drink after midnight the night before your surgery/procedure.  You may have small sips of water to take your medication.    PAT DISCHARGE INSTRUCTIONS    Please call the Same Day Surgery (SDS) Department of the hospital where your procedure will be performed after 2:00 PM the day before your surgery. If you are scheduled on a Monday, or a Tuesday following a Monday holiday, you will need to call on the last business day prior to your surgery.    [place] [id number] [place] [id number] [phone]  Second Floor      Please let your surgeon know if:      You develop any open sores, shingles, burning or painful urination as these may increase your risk of an infection.   You no longer wish to have the surgery.   Any other personal circumstances change that may lead to the need to cancel or defer this surgery-such as being sick or getting admitted to any hospital within one week of your planned procedure.    Your contact details change, such as a change of address or phone number.    Starting now:     Please DO NOT drink alcohol or smoke for 24 hours before surgery. It is well known that quitting smoking can make a huge difference to your health and recovery from surgery. The longer you abstain from smoking, the better your chances of a healthy recovery. If you need help with quitting, call 8-800-QUIT-NOW to be connected to a trained counselor who will discuss the best methods to help you quit.     Before your surgery:    Please stop all supplements 7 days prior to surgery. Or as directed by your surgeon.   Please stop taking NSAID pain medicine such as Advil and Motrin 7 days before surgery.    If you  develop any fever, cough, cold, rashes, cuts, scratches, scrapes, urinary symptoms or infection anywhere on your body (including teeth and gums) prior to surgery, please call your surgeon’s office as soon as possible. This may require treatment to reduce the chance of cancellation on the day of surgery.    The day before your surgery:   DIET- Please follow the diet instructions at the top of your packet.   Get a good night’s rest.  Use the special soap for bathing if you have been instructed to use one.    Scheduled surgery times may change and you will be notified if this occurs - please check your personal voicemail for any updates.     On the morning of surgery:   Wear comfortable, loose fitting clothes which open in the front. Please do not wear moisturizers, creams, lotions, makeup or perfume.    Please bring with you to surgery:   Photo ID and insurance card   Current list of medicines and allergies   Pacemaker/ Defibrillator/Heart stent cards   CPAP machine and mask    Slings/ splints/ crutches   A copy of your complete advanced directive/DHPOA.    Please do NOT bring with you to surgery:   All jewelry and valuables should be left at home.   Prosthetic devices such as contact lenses, hearing aids, dentures, eyelash extensions, hairpins and body piercings must be removed prior to going in to the surgical suite.    After outpatient surgery:   A responsible adult MUST accompany you at the time of discharge and stay with you for 24 hours after your surgery. You may NOT drive yourself home after surgery.    Do not drive, operate machinery, make critical decisions or do activities that require co-ordination or balance until after a night’s sleep.   Do not drink alcoholic beverages for 24 hours.   Instructions for resuming your medications will be provided by your surgeon.    CALL YOUR DOCTOR AFTER SURGERY IF YOU HAVE:     Chills and/or a fever of 101° F or higher.    Redness, swelling, pus or drainage from your  surgical wound or a bad smell from the wound.    Lightheadedness, fainting or confusion.    Persistent vomiting (throwing up) and are not able to eat or drink for 12 hours.    Three or more loose, watery bowel movements in 24 hours (diarrhea).   Difficulty or pain while urinating( after non-urological surgery)    Pain and swelling in your legs, especially if it is only on one side.    Difficulty breathing or are breathing faster than normal.    Any new concerning symptoms.      Patient Information: Pre-Operative Infection Prevention Measures     Why did I have my nose, under my arms, and groin swabbed?  The purpose of the swab is to identify Staphylococcus aureus inside your nose or on your skin.  The swab was sent to the laboratory for culture.  A positive swab/culture for Staphylococcus aureus is called colonization or carriage.      What is Staphylococcus aureus?  Staphylococcus aureus, also known as “staph”, is a germ found on the skin or in the nose of healthy people.  Sometimes Staphylococcus aureus can get into the body and cause an infection.  This can be minor (such as pimples, boils, or other skin problems).  It might also be serious (such as a blood infection, pneumonia, or a surgical site infection).    What is Staphylococcus aureus colonization or carriage?  Colonization or carriage means that a person has the germ but is not sick from it.  These bacteria can be spread on the hands or when breathing or sneezing.    How is Staphylococcus aureus spread?  It is most often spread by close contact with a person or item that carries it.    What happens if my culture is positive for Staphylococcus aureus?  Your doctor/medical team will use this information to guide any antibiotic treatment which may be necessary.  Regardless of the culture results, we will clean the inside of your nose with a betadine swab just before you have your surgery.      Will I get an infection if I have Staphylococcus aureus in my  nose or on my skin?  Anyone can get an infection with Staphylococcus aureus.  However, the best way to reduce your risk of infection is to follow the instructions provided to you for the use of your CHG soap and dental rinse.        Patient Information: Oral/Dental Rinse    What is oral/dental rinse?   It is a mouthwash. It is a way of cleaning the mouth with a germ-killing solution before your surgery.  The solution contains chlorhexidine, commonly known as CHG.   It is used inside the mouth to kill a bacteria known as Staphylococcus aureus.  Let your doctor know if you are allergic to Chlorhexidine.    Why do I need to use CHG oral/dental rinse?  The CHG oral/dental rinse helps to kill a bacteria in your mouth known as Staphylococcus aureus.     This reduces the risk of infection at the surgical site.      Using your CHG oral/dental rinse  STEPS:  Use your CHG oral/dental rinse after you brush your teeth the night before (at bedtime) and the morning of your surgery.  Follow all directions on your prescription label.    Use the cap on the container to measure 15ml   Swish (gargle if you can) the mouthwash in your mouth for at least 30 seconds, (do not swallow) and spit out  After you use your CHG rinse, do not rinse your mouth with water, drink or eat.  Please refer to the prescription label for the appropriate time to resume oral intake      What side effects might I have using the CHG oral/dental rinse?  CHG rinse will stick to plaque on the teeth.  Brush and floss just before use.  Teeth brushing will help avoid staining of plaque during use.      Patient Information: Home Preoperative Antibacterial Shower      What is a home preoperative antibacterial shower?  This shower is a way of cleaning the skin with a germ-killing solution before surgery.  The solution contains chlorhexidine, commonly known as CHG.  CHG is a skin cleanser with germ-killing ability.  Let your doctor know if you are allergic to  chlorhexidine.    Why do I need to take a preoperative antibacterial shower?  Skin is not sterile.  It is best to try to make your skin as free of germs as possible before surgery.  Proper cleansing with a germ-killing soap before surgery can lower the number of germs on your skin.  This helps to reduce the risk of infection at the surgical site.  Following the instructions listed below will help you prepare your skin for surgery.      How do I use the solution?  Steps:  Begin using your CHG soap 5 days before your scheduled surgery on ________________________.    First, wash and rinse your hair using the CHG soap. Keep CHG soap away from ear canals and eyes.  Rinse completely, do not condition.  Hair extensions should be removed.  Wash your face with your normal soap and rinse.    Apply the CHG solution to a clean wet washcloth.  Turn the water off or move away from the water spray to avoid premature rinsing of the CHG soap as you are applying.   Firmly lather your entire body from the neck down.  Do not use on your face.  Pay special attention to the area(s) where your incision(s) will be located unless they are on your face.  Avoid scrubbing your skin too hard.  The important point is to have the CHG soap sit on your skin for 3 minutes.    When the 3 minutes are up, turn on the water and rinse the CHG solution off your body completely.   DO NOT wash with regular soap after you have used the CHG soap solution  Pat yourself dry with a clean, freshly-laundered towel.  DO NOT apply powders, deodorants, or lotions.  Dress in clean, freshly laundered nightclothes.    Be sure to sleep with clean, freshly laundered sheets.  Be aware that CHG will cause stains on fabrics; if you wash them with bleach after use.  Rinse your washcloth and other linens that have contact with CHG completely.  Use only non-chlorine detergents to launder the items used.   The morning of surgery is the fifth day.  Repeat the above steps and  dress in clean comfortable clothing     Whom should I contact if I have any questions regarding the use of CHG soap?  Call the [facility] at [phone] if you have any questions.                  Medication List            Accurate as of January 16, 2025  8:52 AM. Always use your most recent med list.                amantadine 100 mg capsule  Commonly known as: Symmetrel  Take 1 capsule (100 mg) by mouth 2 times a day.  Medication Adjustments for Surgery: Take on the morning of surgery     amitriptyline 10 mg tablet  Commonly known as: Elavil  TAKE 1 TABLET BY MOUTH AT BEDTIME  Notes to patient: Take evening dose before surgery.     atorvastatin 20 mg tablet  Commonly known as: Lipitor  TAKE ONE TABLET BY MOUTH ONCE DAILY  Medication Adjustments for Surgery: Take on the morning of surgery     chlorhexidine 0.12 % solution  Commonly known as: Peridex  Use as directed.  Medication Adjustments for Surgery: Take/Use as prescribed     escitalopram 5 mg tablet  Commonly known as: Lexapro  TAKE 1 TABLET (5 MG) BY MOUTH ONCE DAILY.  Medication Adjustments for Surgery: Take on the morning of surgery     gabapentin 400 mg capsule  Commonly known as: Neurontin  Take 2 capsules (800 mg) by mouth 4 times a day.  Medication Adjustments for Surgery: Take on the morning of surgery     ibuprofen 200 mg tablet  Additional Medication Adjustments for Surgery: Take last dose 7 days before surgery     Pump Patient Supplied Medication  Medication Adjustments for Surgery: Take/Use as prescribed     solifenacin 5 mg tablet  Commonly known as: VESIcare  Take 1 tablet (5 mg) by mouth once daily. Swallow tablet whole; do not crush, chew, or split.  Medication Adjustments for Surgery: Do Not take on the morning of surgery     * tamsulosin 0.4 mg 24 hr capsule  Commonly known as: Flomax  Take 1 capsule (0.4 mg) by mouth once daily in the evening.  Medication Adjustments for  Surgery: Take on the morning of surgery     * tamsulosin 0.4 mg 24 hr capsule  Commonly known as: Flomax  Take 1 capsule (0.4 mg) by mouth once daily.  Notes to patient: DUPLICATE     tiZANidine 2 mg tablet  Commonly known as: Zanaflex  Take 1 tablet (2 mg) by mouth every 6 hours if needed for muscle spasms.  Medication Adjustments for Surgery: Take/Use as prescribed     topiramate 100 mg tablet  Commonly known as: Topamax  Take 1 tablet (100 mg) by mouth 2 times a day.  Medication Adjustments for Surgery: Take on the morning of surgery     traZODone 150 mg tablet  Commonly known as: Desyrel  TAKE 1 TABLET BY MOUTH AT BEDTIME AS NEEDED FOR SLEEP  Notes to patient: Take evening dose before surgery.     Vitamin D3 25 MCG (1000 UT) tablet  Generic drug: cholecalciferol  Additional Medication Adjustments for Surgery: Take last dose 7 days before surgery           * This list has 2 medication(s) that are the same as other medications prescribed for you. Read the directions carefully, and ask your doctor or other care provider to review them with you.                                                  Patient and Family Education Quitting Smoking or Tobacco       Recognizing Dangerous Situations:   Alcohol use during the first month after quitting   Being around smoke or someone who smokes    Times situation routinely smoked   Triggers: car, breaks, coffee, when awakening, social events     Coping Skills:   Learning new ways to manage stress   Exercising   Relaxation breathing   Change routines   Distraction techniques     Websites:   Smoke-Free - offers free text messages and an delano to help you quit. Info includes eating and mood issues that may come with quitting. There is a Live Helpline to talk to an expert. Go to smokefree.gov     Become an Ex-Smoker - the free EX Plan is based on scientific research and useful advice from ex-smokers. It isn't just about quitting smoking. It's about re-learning life without cigarettes  using a 3-step program. Go to becomeanex.[employer]     Centers for Disease Control - offer many suggestions for helping you quit. Includes a Quit Guide and real-life stories. There are sections for specific groups such as LGBT, , different ethnic groups, and pregnant women. Go to cdc.gov/tobacco/campaign/tips     Other Resources:     [place] Tobacco Quit Line - call 1-800-QUIT-NOW or 7-221-273-2483.   [facility] 2-1-1 - to find local programs and resources. Call 211 or go to 211.[employer]. [facility] Tobacco Cessation Program - call [phone].   American Lung Association - offers classes for quitting smoking. Some places may charge a fee. For a list of classes, go to lung.org or call 1-800LUNGPortico SystemsC[facility].     Some things to think about:   The health benefits of quitting smoking can help most of the major parts of your body.   There is no safe amount of cigarette smoke. Quitting smoking can add years to your life.   When you quit, you'll also protect your loved ones from dangerous secondhand smoke.   Make a plan, join a support group, and talk to your physician to assist in quitting smoking.     [facility], 11/20; PI-602 (6/22)      Preoperative Brain Exercises    What are brain exercises?  A brain exercise is any activity that engages your thinking (cognitive) skills.    What types of activities are considered brain exercises?  Jigsaw puzzles, crossword puzzles, word jumble, memory games, word search, and many more.  Many can be found free online or on your phone via a mobile delano.    Why should I do brain exercises before my surgery?  More recent research has shown brain exercise before surgery can lower the risk of postoperative delirium (confusion) which can be especially important for older adults.  Patients who did brain exercises for 5 to 10 hours the days before surgery, cut their risk of postoperative delirium in half up to 1 week after surgery.

## 2025-01-18 LAB — STAPHYLOCOCCUS SPEC CULT: ABNORMAL

## 2025-01-20 LAB
ALBUMIN: 3.9 G/DL (ref 3.4–5)
ALPHA 1 GLOBULIN: 0.3 G/DL (ref 0.2–0.6)
ALPHA 2 GLOBULIN: 0.8 G/DL (ref 0.4–1.1)
BETA GLOBULIN: 0.6 G/DL (ref 0.5–1.2)
GAMMA GLOBULIN: 1 G/DL (ref 0.5–1.4)
IMMUNOFIXATION COMMENT: NORMAL
PATH REVIEW - SERUM IMMUNOFIXATION: NORMAL
PATH REVIEW-SERUM PROTEIN ELECTROPHORESIS: NORMAL
PROTEIN ELECTROPHORESIS COMMENT: NORMAL

## 2025-01-31 ENCOUNTER — PHARMACY VISIT (OUTPATIENT)
Dept: PHARMACY | Facility: CLINIC | Age: 70
End: 2025-01-31
Payer: MEDICARE

## 2025-01-31 ENCOUNTER — ANESTHESIA (OUTPATIENT)
Dept: OPERATING ROOM | Facility: HOSPITAL | Age: 70
End: 2025-01-31
Payer: MEDICARE

## 2025-01-31 ENCOUNTER — HOME HEALTH ADMISSION (OUTPATIENT)
Dept: HOME HEALTH SERVICES | Facility: HOME HEALTH | Age: 70
End: 2025-01-31
Payer: MEDICARE

## 2025-01-31 ENCOUNTER — ANESTHESIA EVENT (OUTPATIENT)
Dept: OPERATING ROOM | Facility: HOSPITAL | Age: 70
End: 2025-01-31
Payer: MEDICARE

## 2025-01-31 ENCOUNTER — APPOINTMENT (OUTPATIENT)
Dept: RADIOLOGY | Facility: HOSPITAL | Age: 70
End: 2025-01-31
Payer: MEDICARE

## 2025-01-31 ENCOUNTER — HOSPITAL ENCOUNTER (OUTPATIENT)
Facility: HOSPITAL | Age: 70
Discharge: HOME | End: 2025-02-01
Attending: ORTHOPAEDIC SURGERY | Admitting: ORTHOPAEDIC SURGERY
Payer: MEDICARE

## 2025-01-31 DIAGNOSIS — M19.019 SHOULDER ARTHRITIS: Primary | ICD-10-CM

## 2025-01-31 PROCEDURE — 2500000001 HC RX 250 WO HCPCS SELF ADMINISTERED DRUGS (ALT 637 FOR MEDICARE OP): Performed by: NURSE PRACTITIONER

## 2025-01-31 PROCEDURE — 73030 X-RAY EXAM OF SHOULDER: CPT | Mod: RIGHT SIDE | Performed by: RADIOLOGY

## 2025-01-31 PROCEDURE — 2500000004 HC RX 250 GENERAL PHARMACY W/ HCPCS (ALT 636 FOR OP/ED): Performed by: ORTHOPAEDIC SURGERY

## 2025-01-31 PROCEDURE — 3600000005 HC OR TIME - INITIAL BASE CHARGE - PROCEDURE LEVEL FIVE: Performed by: ORTHOPAEDIC SURGERY

## 2025-01-31 PROCEDURE — 3600000010 HC OR TIME - EACH INCREMENTAL 1 MINUTE - PROCEDURE LEVEL FIVE: Performed by: ORTHOPAEDIC SURGERY

## 2025-01-31 PROCEDURE — 2780000003 HC OR 278 NO HCPCS: Performed by: ORTHOPAEDIC SURGERY

## 2025-01-31 PROCEDURE — 2500000002 HC RX 250 W HCPCS SELF ADMINISTERED DRUGS (ALT 637 FOR MEDICARE OP, ALT 636 FOR OP/ED): Mod: MUE | Performed by: NURSE PRACTITIONER

## 2025-01-31 PROCEDURE — 2500000005 HC RX 250 GENERAL PHARMACY W/O HCPCS: Performed by: ORTHOPAEDIC SURGERY

## 2025-01-31 PROCEDURE — RXMED WILLOW AMBULATORY MEDICATION CHARGE

## 2025-01-31 PROCEDURE — C1713 ANCHOR/SCREW BN/BN,TIS/BN: HCPCS | Performed by: ORTHOPAEDIC SURGERY

## 2025-01-31 PROCEDURE — 3700000002 HC GENERAL ANESTHESIA TIME - EACH INCREMENTAL 1 MINUTE: Performed by: ORTHOPAEDIC SURGERY

## 2025-01-31 PROCEDURE — 7100000001 HC RECOVERY ROOM TIME - INITIAL BASE CHARGE: Performed by: ORTHOPAEDIC SURGERY

## 2025-01-31 PROCEDURE — 2500000004 HC RX 250 GENERAL PHARMACY W/ HCPCS (ALT 636 FOR OP/ED): Mod: JW | Performed by: ANESTHESIOLOGY

## 2025-01-31 PROCEDURE — 73030 X-RAY EXAM OF SHOULDER: CPT | Mod: RT

## 2025-01-31 PROCEDURE — 2500000001 HC RX 250 WO HCPCS SELF ADMINISTERED DRUGS (ALT 637 FOR MEDICARE OP): Performed by: ORTHOPAEDIC SURGERY

## 2025-01-31 PROCEDURE — 7100000002 HC RECOVERY ROOM TIME - EACH INCREMENTAL 1 MINUTE: Performed by: ORTHOPAEDIC SURGERY

## 2025-01-31 PROCEDURE — 2740000001 HC OR 274 NO HCPCS: Performed by: ORTHOPAEDIC SURGERY

## 2025-01-31 PROCEDURE — 2500000002 HC RX 250 W HCPCS SELF ADMINISTERED DRUGS (ALT 637 FOR MEDICARE OP, ALT 636 FOR OP/ED): Performed by: ANESTHESIOLOGY

## 2025-01-31 PROCEDURE — 2720000007 HC OR 272 NO HCPCS: Performed by: ORTHOPAEDIC SURGERY

## 2025-01-31 PROCEDURE — 99204 OFFICE O/P NEW MOD 45 MIN: CPT | Performed by: NURSE PRACTITIONER

## 2025-01-31 PROCEDURE — 7100000011 HC EXTENDED STAY RECOVERY HOURLY - NURSING UNIT

## 2025-01-31 PROCEDURE — 2500000001 HC RX 250 WO HCPCS SELF ADMINISTERED DRUGS (ALT 637 FOR MEDICARE OP): Performed by: ANESTHESIOLOGY

## 2025-01-31 PROCEDURE — 2500000005 HC RX 250 GENERAL PHARMACY W/O HCPCS: Performed by: NURSE ANESTHETIST, CERTIFIED REGISTERED

## 2025-01-31 PROCEDURE — 3700000001 HC GENERAL ANESTHESIA TIME - INITIAL BASE CHARGE: Performed by: ORTHOPAEDIC SURGERY

## 2025-01-31 PROCEDURE — L3670 SO ACRO/CLAV CAN WEB PRE OTS: HCPCS | Performed by: ORTHOPAEDIC SURGERY

## 2025-01-31 PROCEDURE — C1776 JOINT DEVICE (IMPLANTABLE): HCPCS | Performed by: ORTHOPAEDIC SURGERY

## 2025-01-31 PROCEDURE — 2500000004 HC RX 250 GENERAL PHARMACY W/ HCPCS (ALT 636 FOR OP/ED): Performed by: NURSE ANESTHETIST, CERTIFIED REGISTERED

## 2025-01-31 DEVICE — 5.5X24MM PERIPHERAL SCREW, LOCKING
Type: IMPLANTABLE DEVICE | Site: SHOULDER | Status: FUNCTIONAL
Brand: ARTHREX®

## 2025-01-31 DEVICE — DYNANITE VIP GLENOID PIN, NITINOL, 2.8MM
Type: IMPLANTABLE DEVICE | Site: SHOULDER | Status: NON-FUNCTIONAL
Brand: ARTHREX®

## 2025-01-31 DEVICE — 4.5X40MM PERIPHERAL SCREW, NON-LOCKING
Type: IMPLANTABLE DEVICE | Site: SHOULDER | Status: FUNCTIONAL
Brand: ARTHREX®

## 2025-01-31 DEVICE — POST, MODULAR, 25MM: Type: IMPLANTABLE DEVICE | Site: SHOULDER | Status: FUNCTIONAL

## 2025-01-31 DEVICE — UNIVERS REVERS SPACER 36+6MM
Type: IMPLANTABLE DEVICE | Site: SHOULDER | Status: FUNCTIONAL
Brand: ARTHREX®

## 2025-01-31 DEVICE — 5.5X16MM PERIPHERAL SCREW, LOCKING
Type: IMPLANTABLE DEVICE | Site: SHOULDER | Status: FUNCTIONAL
Brand: ARTHREX®

## 2025-01-31 DEVICE — UNIVERS REVERS SUTURE CUP, 36 (+2 LEFT)
Type: IMPLANTABLE DEVICE | Site: SHOULDER | Status: FUNCTIONAL
Brand: ARTHREX®

## 2025-01-31 DEVICE — 5.5X28MM PERIPHERAL SCREW, LOCKING
Type: IMPLANTABLE DEVICE | Site: SHOULDER | Status: FUNCTIONAL
Brand: ARTHREX®

## 2025-01-31 DEVICE — 36 +4 LAT/24 GLENOSPHERE
Type: IMPLANTABLE DEVICE | Site: SHOULDER | Status: FUNCTIONAL
Brand: ARTHREX®

## 2025-01-31 DEVICE — HUM INSERT S/36+3 TO FIT IN 36 CUP CONST
Type: IMPLANTABLE DEVICE | Site: SHOULDER | Status: FUNCTIONAL
Brand: ARTHREX®

## 2025-01-31 DEVICE — 24MM BASEPLATE, 10° FULL AUG, +2 LAT, ST
Type: IMPLANTABLE DEVICE | Site: SHOULDER | Status: FUNCTIONAL
Brand: ARTHREX®

## 2025-01-31 RX ORDER — CEFAZOLIN SODIUM 2 G/100ML
2 INJECTION, SOLUTION INTRAVENOUS EVERY 8 HOURS
Status: COMPLETED | OUTPATIENT
Start: 2025-01-31 | End: 2025-02-01

## 2025-01-31 RX ORDER — AMANTADINE HYDROCHLORIDE 100 MG/1
100 CAPSULE, GELATIN COATED ORAL 2 TIMES DAILY
Status: DISCONTINUED | OUTPATIENT
Start: 2025-01-31 | End: 2025-02-01 | Stop reason: HOSPADM

## 2025-01-31 RX ORDER — SODIUM CHLORIDE, SODIUM LACTATE, POTASSIUM CHLORIDE, CALCIUM CHLORIDE 600; 310; 30; 20 MG/100ML; MG/100ML; MG/100ML; MG/100ML
INJECTION, SOLUTION INTRAVENOUS CONTINUOUS PRN
Status: DISCONTINUED | OUTPATIENT
Start: 2025-01-31 | End: 2025-01-31

## 2025-01-31 RX ORDER — MIDAZOLAM HYDROCHLORIDE 1 MG/ML
2 INJECTION, SOLUTION INTRAMUSCULAR; INTRAVENOUS ONCE
Status: COMPLETED | OUTPATIENT
Start: 2025-01-31 | End: 2025-01-31

## 2025-01-31 RX ORDER — TOPIRAMATE 100 MG/1
100 TABLET, FILM COATED ORAL 2 TIMES DAILY
Status: DISCONTINUED | OUTPATIENT
Start: 2025-01-31 | End: 2025-02-01 | Stop reason: HOSPADM

## 2025-01-31 RX ORDER — PROPOFOL 10 MG/ML
INJECTION, EMULSION INTRAVENOUS AS NEEDED
Status: DISCONTINUED | OUTPATIENT
Start: 2025-01-31 | End: 2025-01-31

## 2025-01-31 RX ORDER — PANTOPRAZOLE SODIUM 40 MG/1
40 TABLET, DELAYED RELEASE ORAL
Status: DISCONTINUED | OUTPATIENT
Start: 2025-02-01 | End: 2025-02-01 | Stop reason: HOSPADM

## 2025-01-31 RX ORDER — ONDANSETRON HYDROCHLORIDE 2 MG/ML
INJECTION, SOLUTION INTRAVENOUS AS NEEDED
Status: DISCONTINUED | OUTPATIENT
Start: 2025-01-31 | End: 2025-01-31

## 2025-01-31 RX ORDER — ONDANSETRON HYDROCHLORIDE 2 MG/ML
4 INJECTION, SOLUTION INTRAVENOUS EVERY 8 HOURS PRN
Status: DISCONTINUED | OUTPATIENT
Start: 2025-01-31 | End: 2025-02-01 | Stop reason: HOSPADM

## 2025-01-31 RX ORDER — AMITRIPTYLINE HYDROCHLORIDE 10 MG/1
10 TABLET, FILM COATED ORAL NIGHTLY
Status: DISCONTINUED | OUTPATIENT
Start: 2025-01-31 | End: 2025-02-01 | Stop reason: HOSPADM

## 2025-01-31 RX ORDER — ONDANSETRON 4 MG/1
4 TABLET, ORALLY DISINTEGRATING ORAL EVERY 8 HOURS PRN
Status: DISCONTINUED | OUTPATIENT
Start: 2025-01-31 | End: 2025-02-01 | Stop reason: HOSPADM

## 2025-01-31 RX ORDER — HYDROMORPHONE HYDROCHLORIDE 0.2 MG/ML
0.1 INJECTION INTRAMUSCULAR; INTRAVENOUS; SUBCUTANEOUS EVERY 5 MIN PRN
Status: DISCONTINUED | OUTPATIENT
Start: 2025-01-31 | End: 2025-01-31 | Stop reason: HOSPADM

## 2025-01-31 RX ORDER — CEFAZOLIN SODIUM 2 G/100ML
2 INJECTION, SOLUTION INTRAVENOUS ONCE
Status: COMPLETED | OUTPATIENT
Start: 2025-01-31 | End: 2025-01-31

## 2025-01-31 RX ORDER — GABAPENTIN 400 MG/1
800 CAPSULE ORAL 4 TIMES DAILY
Status: DISCONTINUED | OUTPATIENT
Start: 2025-01-31 | End: 2025-02-01 | Stop reason: HOSPADM

## 2025-01-31 RX ORDER — MIDAZOLAM HYDROCHLORIDE 1 MG/ML
INJECTION, SOLUTION INTRAMUSCULAR; INTRAVENOUS AS NEEDED
Status: DISCONTINUED | OUTPATIENT
Start: 2025-01-31 | End: 2025-01-31

## 2025-01-31 RX ORDER — NORETHINDRONE AND ETHINYL ESTRADIOL 0.5-0.035
KIT ORAL AS NEEDED
Status: DISCONTINUED | OUTPATIENT
Start: 2025-01-31 | End: 2025-01-31

## 2025-01-31 RX ORDER — DIPHENHYDRAMINE HYDROCHLORIDE 50 MG/ML
12.5 INJECTION INTRAMUSCULAR; INTRAVENOUS ONCE AS NEEDED
Status: DISCONTINUED | OUTPATIENT
Start: 2025-01-31 | End: 2025-01-31 | Stop reason: HOSPADM

## 2025-01-31 RX ORDER — HYDRALAZINE HYDROCHLORIDE 20 MG/ML
10 INJECTION INTRAMUSCULAR; INTRAVENOUS EVERY 30 MIN PRN
Status: DISCONTINUED | OUTPATIENT
Start: 2025-01-31 | End: 2025-01-31 | Stop reason: HOSPADM

## 2025-01-31 RX ORDER — ATORVASTATIN CALCIUM 20 MG/1
20 TABLET, FILM COATED ORAL DAILY
Status: DISCONTINUED | OUTPATIENT
Start: 2025-02-01 | End: 2025-02-01 | Stop reason: HOSPADM

## 2025-01-31 RX ORDER — LABETALOL HYDROCHLORIDE 5 MG/ML
10 INJECTION, SOLUTION INTRAVENOUS ONCE AS NEEDED
Status: DISCONTINUED | OUTPATIENT
Start: 2025-01-31 | End: 2025-01-31 | Stop reason: HOSPADM

## 2025-01-31 RX ORDER — MIDAZOLAM HYDROCHLORIDE 1 MG/ML
1 INJECTION, SOLUTION INTRAMUSCULAR; INTRAVENOUS ONCE AS NEEDED
Status: DISCONTINUED | OUTPATIENT
Start: 2025-01-31 | End: 2025-01-31 | Stop reason: HOSPADM

## 2025-01-31 RX ORDER — GLYCOPYRROLATE 0.2 MG/ML
INJECTION INTRAMUSCULAR; INTRAVENOUS AS NEEDED
Status: DISCONTINUED | OUTPATIENT
Start: 2025-01-31 | End: 2025-01-31

## 2025-01-31 RX ORDER — TRANEXAMIC ACID 10 MG/ML
INJECTION, SOLUTION INTRAVENOUS AS NEEDED
Status: DISCONTINUED | OUTPATIENT
Start: 2025-01-31 | End: 2025-01-31

## 2025-01-31 RX ORDER — MEPERIDINE HYDROCHLORIDE 25 MG/ML
12.5 INJECTION INTRAMUSCULAR; INTRAVENOUS; SUBCUTANEOUS EVERY 10 MIN PRN
Status: DISCONTINUED | OUTPATIENT
Start: 2025-01-31 | End: 2025-01-31 | Stop reason: HOSPADM

## 2025-01-31 RX ORDER — FENTANYL CITRATE 50 UG/ML
50 INJECTION, SOLUTION INTRAMUSCULAR; INTRAVENOUS ONCE
Status: COMPLETED | OUTPATIENT
Start: 2025-01-31 | End: 2025-01-31

## 2025-01-31 RX ORDER — FENTANYL CITRATE 50 UG/ML
INJECTION, SOLUTION INTRAMUSCULAR; INTRAVENOUS AS NEEDED
Status: DISCONTINUED | OUTPATIENT
Start: 2025-01-31 | End: 2025-01-31

## 2025-01-31 RX ORDER — ALBUTEROL SULFATE 0.83 MG/ML
2.5 SOLUTION RESPIRATORY (INHALATION) ONCE
Status: COMPLETED | OUTPATIENT
Start: 2025-01-31 | End: 2025-01-31

## 2025-01-31 RX ORDER — KETOROLAC TROMETHAMINE 30 MG/ML
15 INJECTION, SOLUTION INTRAMUSCULAR; INTRAVENOUS ONCE AS NEEDED
Status: DISCONTINUED | OUTPATIENT
Start: 2025-01-31 | End: 2025-01-31 | Stop reason: HOSPADM

## 2025-01-31 RX ORDER — OXYCODONE HCL 10 MG/1
10 TABLET, FILM COATED, EXTENDED RELEASE ORAL ONCE AS NEEDED
Status: COMPLETED | OUTPATIENT
Start: 2025-01-31 | End: 2025-02-01

## 2025-01-31 RX ORDER — ROCURONIUM BROMIDE 10 MG/ML
INJECTION, SOLUTION INTRAVENOUS AS NEEDED
Status: DISCONTINUED | OUTPATIENT
Start: 2025-01-31 | End: 2025-01-31

## 2025-01-31 RX ORDER — DEXMEDETOMIDINE HYDROCHLORIDE 100 UG/ML
INJECTION, SOLUTION INTRAVENOUS AS NEEDED
Status: DISCONTINUED | OUTPATIENT
Start: 2025-01-31 | End: 2025-01-31

## 2025-01-31 RX ORDER — PHENYLEPHRINE HCL IN 0.9% NACL 0.4MG/10ML
SYRINGE (ML) INTRAVENOUS AS NEEDED
Status: DISCONTINUED | OUTPATIENT
Start: 2025-01-31 | End: 2025-01-31

## 2025-01-31 RX ORDER — ONDANSETRON 4 MG/1
4 TABLET, ORALLY DISINTEGRATING ORAL ONCE AS NEEDED
Status: DISCONTINUED | OUTPATIENT
Start: 2025-01-31 | End: 2025-02-01 | Stop reason: HOSPADM

## 2025-01-31 RX ORDER — ONDANSETRON HYDROCHLORIDE 2 MG/ML
4 INJECTION, SOLUTION INTRAVENOUS ONCE AS NEEDED
Status: DISCONTINUED | OUTPATIENT
Start: 2025-01-31 | End: 2025-01-31 | Stop reason: HOSPADM

## 2025-01-31 RX ORDER — LIDOCAINE HYDROCHLORIDE 10 MG/ML
0.1 INJECTION, SOLUTION INFILTRATION; PERINEURAL ONCE
Status: DISCONTINUED | OUTPATIENT
Start: 2025-01-31 | End: 2025-01-31 | Stop reason: HOSPADM

## 2025-01-31 RX ORDER — TAMSULOSIN HYDROCHLORIDE 0.4 MG/1
0.4 CAPSULE ORAL EVERY EVENING
Status: DISCONTINUED | OUTPATIENT
Start: 2025-01-31 | End: 2025-02-01 | Stop reason: HOSPADM

## 2025-01-31 RX ORDER — OXYCODONE AND ACETAMINOPHEN 5; 325 MG/1; MG/1
1 TABLET ORAL EVERY 6 HOURS PRN
Status: DISCONTINUED | OUTPATIENT
Start: 2025-01-31 | End: 2025-02-01 | Stop reason: HOSPADM

## 2025-01-31 RX ORDER — ESCITALOPRAM OXALATE 10 MG/1
5 TABLET ORAL DAILY
Status: DISCONTINUED | OUTPATIENT
Start: 2025-02-01 | End: 2025-02-01 | Stop reason: HOSPADM

## 2025-01-31 RX ORDER — SODIUM CHLORIDE, SODIUM LACTATE, POTASSIUM CHLORIDE, CALCIUM CHLORIDE 600; 310; 30; 20 MG/100ML; MG/100ML; MG/100ML; MG/100ML
50 INJECTION, SOLUTION INTRAVENOUS CONTINUOUS
Status: DISCONTINUED | OUTPATIENT
Start: 2025-01-31 | End: 2025-02-01 | Stop reason: HOSPADM

## 2025-01-31 RX ORDER — HYDROMORPHONE HYDROCHLORIDE 0.2 MG/ML
0.2 INJECTION INTRAMUSCULAR; INTRAVENOUS; SUBCUTANEOUS
Status: DISCONTINUED | OUTPATIENT
Start: 2025-01-31 | End: 2025-02-01 | Stop reason: SDUPTHER

## 2025-01-31 RX ORDER — LIDOCAINE HYDROCHLORIDE 20 MG/ML
INJECTION, SOLUTION INFILTRATION; PERINEURAL AS NEEDED
Status: DISCONTINUED | OUTPATIENT
Start: 2025-01-31 | End: 2025-01-31

## 2025-01-31 RX ORDER — METOCLOPRAMIDE 10 MG/1
10 TABLET ORAL ONCE
Status: COMPLETED | OUTPATIENT
Start: 2025-01-31 | End: 2025-01-31

## 2025-01-31 RX ORDER — VANCOMYCIN HYDROCHLORIDE 1 G/20ML
INJECTION, POWDER, LYOPHILIZED, FOR SOLUTION INTRAVENOUS AS NEEDED
Status: DISCONTINUED | OUTPATIENT
Start: 2025-01-31 | End: 2025-01-31 | Stop reason: HOSPADM

## 2025-01-31 RX ORDER — ACETAMINOPHEN 325 MG/1
650 TABLET ORAL EVERY 6 HOURS SCHEDULED
Status: DISCONTINUED | OUTPATIENT
Start: 2025-01-31 | End: 2025-02-01 | Stop reason: HOSPADM

## 2025-01-31 RX ORDER — NORETHINDRONE AND ETHINYL ESTRADIOL 0.5-0.035
50 KIT ORAL ONCE
Status: DISCONTINUED | OUTPATIENT
Start: 2025-01-31 | End: 2025-01-31 | Stop reason: HOSPADM

## 2025-01-31 RX ORDER — ALBUTEROL SULFATE 0.83 MG/ML
2.5 SOLUTION RESPIRATORY (INHALATION) ONCE AS NEEDED
Status: DISCONTINUED | OUTPATIENT
Start: 2025-01-31 | End: 2025-01-31 | Stop reason: HOSPADM

## 2025-01-31 RX ORDER — OXYCODONE HYDROCHLORIDE 5 MG/1
5 TABLET ORAL ONCE AS NEEDED
Status: DISCONTINUED | OUTPATIENT
Start: 2025-01-31 | End: 2025-02-01 | Stop reason: HOSPADM

## 2025-01-31 RX ORDER — OXYCODONE AND ACETAMINOPHEN 5; 325 MG/1; MG/1
1 TABLET ORAL EVERY 6 HOURS PRN
Qty: 25 TABLET | Refills: 0 | Status: SHIPPED | OUTPATIENT
Start: 2025-01-31 | End: 2025-02-07

## 2025-01-31 RX ORDER — GLYCOPYRROLATE 0.2 MG/ML
0.2 INJECTION INTRAMUSCULAR; INTRAVENOUS ONCE
Status: DISCONTINUED | OUTPATIENT
Start: 2025-01-31 | End: 2025-01-31 | Stop reason: HOSPADM

## 2025-01-31 RX ORDER — FAMOTIDINE 20 MG/1
20 TABLET, FILM COATED ORAL ONCE
Status: COMPLETED | OUTPATIENT
Start: 2025-01-31 | End: 2025-01-31

## 2025-01-31 RX ADMIN — PROPOFOL 120 MG: 10 INJECTION, EMULSION INTRAVENOUS at 11:30

## 2025-01-31 RX ADMIN — Medication 100 MCG: at 13:34

## 2025-01-31 RX ADMIN — CEFAZOLIN SODIUM 2 G: 2 INJECTION, SOLUTION INTRAVENOUS at 20:43

## 2025-01-31 RX ADMIN — Medication 100 MCG: at 11:33

## 2025-01-31 RX ADMIN — ONDANSETRON HYDROCHLORIDE 4 MG: 2 INJECTION INTRAMUSCULAR; INTRAVENOUS at 13:57

## 2025-01-31 RX ADMIN — METOCLOPRAMIDE 10 MG: 10 TABLET ORAL at 09:37

## 2025-01-31 RX ADMIN — CEFAZOLIN SODIUM 2 G: 2 INJECTION, SOLUTION INTRAVENOUS at 11:27

## 2025-01-31 RX ADMIN — GLYCOPYRROLATE 0.2 MG: 0.2 INJECTION INTRAMUSCULAR; INTRAVENOUS at 11:58

## 2025-01-31 RX ADMIN — ACETAMINOPHEN 650 MG: 325 TABLET ORAL at 17:11

## 2025-01-31 RX ADMIN — Medication 100 MCG: at 13:37

## 2025-01-31 RX ADMIN — TAMSULOSIN HYDROCHLORIDE 0.4 MG: 0.4 CAPSULE ORAL at 20:43

## 2025-01-31 RX ADMIN — HYDROMORPHONE HYDROCHLORIDE 0.5 MG: 1 INJECTION, SOLUTION INTRAMUSCULAR; INTRAVENOUS; SUBCUTANEOUS at 14:42

## 2025-01-31 RX ADMIN — Medication 100 MCG: at 13:27

## 2025-01-31 RX ADMIN — Medication 100 MCG: at 12:18

## 2025-01-31 RX ADMIN — SODIUM CHLORIDE, POTASSIUM CHLORIDE, SODIUM LACTATE AND CALCIUM CHLORIDE 50 ML/HR: 600; 310; 30; 20 INJECTION, SOLUTION INTRAVENOUS at 16:44

## 2025-01-31 RX ADMIN — EPHEDRINE SULFATE 10 MG: 50 INJECTION, SOLUTION INTRAVENOUS at 13:49

## 2025-01-31 RX ADMIN — LIDOCAINE HYDROCHLORIDE 100 MG: 20 INJECTION, SOLUTION INFILTRATION; PERINEURAL at 11:30

## 2025-01-31 RX ADMIN — Medication 100 MCG: at 13:50

## 2025-01-31 RX ADMIN — FAMOTIDINE 20 MG: 20 TABLET ORAL at 09:37

## 2025-01-31 RX ADMIN — MIDAZOLAM HYDROCHLORIDE 2 MG: 1 INJECTION, SOLUTION INTRAMUSCULAR; INTRAVENOUS at 10:41

## 2025-01-31 RX ADMIN — FENTANYL CITRATE 50 MCG: 50 INJECTION, SOLUTION INTRAMUSCULAR; INTRAVENOUS at 11:30

## 2025-01-31 RX ADMIN — ALBUTEROL SULFATE 2.5 MG: 2.5 SOLUTION RESPIRATORY (INHALATION) at 09:37

## 2025-01-31 RX ADMIN — OXYCODONE HYDROCHLORIDE AND ACETAMINOPHEN 1 TABLET: 5; 325 TABLET ORAL at 20:43

## 2025-01-31 RX ADMIN — GABAPENTIN 800 MG: 400 CAPSULE ORAL at 20:42

## 2025-01-31 RX ADMIN — GABAPENTIN 800 MG: 400 CAPSULE ORAL at 18:41

## 2025-01-31 RX ADMIN — POVIDONE-IODINE 1 APPLICATION: 5 SOLUTION TOPICAL at 10:01

## 2025-01-31 RX ADMIN — SODIUM CHLORIDE, POTASSIUM CHLORIDE, SODIUM LACTATE AND CALCIUM CHLORIDE: 600; 310; 30; 20 INJECTION, SOLUTION INTRAVENOUS at 11:11

## 2025-01-31 RX ADMIN — AMANTADINE HYDROCHLORIDE 100 MG: 100 CAPSULE ORAL at 20:43

## 2025-01-31 RX ADMIN — TOPIRAMATE 100 MG: 100 TABLET, FILM COATED ORAL at 20:43

## 2025-01-31 RX ADMIN — Medication 100 MCG: at 13:20

## 2025-01-31 RX ADMIN — FENTANYL CITRATE 50 MCG: 50 INJECTION, SOLUTION INTRAMUSCULAR; INTRAVENOUS at 11:39

## 2025-01-31 RX ADMIN — Medication 100 MCG: at 11:58

## 2025-01-31 RX ADMIN — DEXMEDETOMIDINE 10 MCG: 100 INJECTION, SOLUTION INTRAVENOUS at 12:55

## 2025-01-31 RX ADMIN — SODIUM CHLORIDE, POTASSIUM CHLORIDE, SODIUM LACTATE AND CALCIUM CHLORIDE: 600; 310; 30; 20 INJECTION, SOLUTION INTRAVENOUS at 13:58

## 2025-01-31 RX ADMIN — ROCURONIUM BROMIDE 40 MG: 10 INJECTION, SOLUTION INTRAVENOUS at 11:30

## 2025-01-31 RX ADMIN — HYDROMORPHONE HYDROCHLORIDE 0.5 MG: 1 INJECTION, SOLUTION INTRAMUSCULAR; INTRAVENOUS; SUBCUTANEOUS at 14:48

## 2025-01-31 RX ADMIN — MIDAZOLAM 2 MG: 1 INJECTION INTRAMUSCULAR; INTRAVENOUS at 11:21

## 2025-01-31 RX ADMIN — TRANEXAMIC ACID 1000 MG: 10 INJECTION, SOLUTION INTRAVENOUS at 11:34

## 2025-01-31 RX ADMIN — HYDROMORPHONE HYDROCHLORIDE 0.5 MG: 1 INJECTION, SOLUTION INTRAMUSCULAR; INTRAVENOUS; SUBCUTANEOUS at 15:33

## 2025-01-31 RX ADMIN — SUGAMMADEX 200 MG: 100 INJECTION, SOLUTION INTRAVENOUS at 14:01

## 2025-01-31 RX ADMIN — Medication 100 MCG: at 13:04

## 2025-01-31 RX ADMIN — Medication 100 MCG: at 12:28

## 2025-01-31 RX ADMIN — Medication 100 MCG: at 13:47

## 2025-01-31 RX ADMIN — FENTANYL CITRATE 50 MCG: 50 INJECTION INTRAMUSCULAR; INTRAVENOUS at 10:42

## 2025-01-31 SDOH — SOCIAL STABILITY: SOCIAL INSECURITY: DOES ANYONE TRY TO KEEP YOU FROM HAVING/CONTACTING OTHER FRIENDS OR DOING THINGS OUTSIDE YOUR HOME?: NO

## 2025-01-31 SDOH — ECONOMIC STABILITY: INCOME INSECURITY: IN THE PAST 12 MONTHS HAS THE ELECTRIC, GAS, OIL, OR WATER COMPANY THREATENED TO SHUT OFF SERVICES IN YOUR HOME?: NO

## 2025-01-31 SDOH — SOCIAL STABILITY: SOCIAL INSECURITY
WITHIN THE LAST YEAR, HAVE YOU BEEN RAPED OR FORCED TO HAVE ANY KIND OF SEXUAL ACTIVITY BY YOUR PARTNER OR EX-PARTNER?: NO

## 2025-01-31 SDOH — SOCIAL STABILITY: SOCIAL INSECURITY: HAVE YOU HAD THOUGHTS OF HARMING ANYONE ELSE?: NO

## 2025-01-31 SDOH — SOCIAL STABILITY: SOCIAL INSECURITY: HAS ANYONE EVER THREATENED TO HURT YOUR FAMILY OR YOUR PETS?: NO

## 2025-01-31 SDOH — SOCIAL STABILITY: SOCIAL INSECURITY: ABUSE: ADULT

## 2025-01-31 SDOH — SOCIAL STABILITY: SOCIAL INSECURITY: WITHIN THE LAST YEAR, HAVE YOU BEEN HUMILIATED OR EMOTIONALLY ABUSED IN OTHER WAYS BY YOUR PARTNER OR EX-PARTNER?: NO

## 2025-01-31 SDOH — SOCIAL STABILITY: SOCIAL INSECURITY
WITHIN THE LAST YEAR, HAVE YOU BEEN KICKED, HIT, SLAPPED, OR OTHERWISE PHYSICALLY HURT BY YOUR PARTNER OR EX-PARTNER?: NO

## 2025-01-31 SDOH — ECONOMIC STABILITY: HOUSING INSECURITY: IN THE LAST 12 MONTHS, WAS THERE A TIME WHEN YOU WERE NOT ABLE TO PAY THE MORTGAGE OR RENT ON TIME?: NO

## 2025-01-31 SDOH — ECONOMIC STABILITY: FOOD INSECURITY: WITHIN THE PAST 12 MONTHS, THE FOOD YOU BOUGHT JUST DIDN'T LAST AND YOU DIDN'T HAVE MONEY TO GET MORE.: NEVER TRUE

## 2025-01-31 SDOH — ECONOMIC STABILITY: FOOD INSECURITY: HOW HARD IS IT FOR YOU TO PAY FOR THE VERY BASICS LIKE FOOD, HOUSING, MEDICAL CARE, AND HEATING?: NOT HARD AT ALL

## 2025-01-31 SDOH — ECONOMIC STABILITY: HOUSING INSECURITY: AT ANY TIME IN THE PAST 12 MONTHS, WERE YOU HOMELESS OR LIVING IN A SHELTER (INCLUDING NOW)?: NO

## 2025-01-31 SDOH — SOCIAL STABILITY: SOCIAL INSECURITY: WITHIN THE LAST YEAR, HAVE YOU BEEN AFRAID OF YOUR PARTNER OR EX-PARTNER?: NO

## 2025-01-31 SDOH — SOCIAL STABILITY: SOCIAL INSECURITY: HAVE YOU HAD ANY THOUGHTS OF HARMING ANYONE ELSE?: NO

## 2025-01-31 SDOH — ECONOMIC STABILITY: FOOD INSECURITY: WITHIN THE PAST 12 MONTHS, YOU WORRIED THAT YOUR FOOD WOULD RUN OUT BEFORE YOU GOT THE MONEY TO BUY MORE.: NEVER TRUE

## 2025-01-31 SDOH — SOCIAL STABILITY: SOCIAL INSECURITY: ARE YOU OR HAVE YOU BEEN THREATENED OR ABUSED PHYSICALLY, EMOTIONALLY, OR SEXUALLY BY ANYONE?: NO

## 2025-01-31 SDOH — ECONOMIC STABILITY: HOUSING INSECURITY: IN THE PAST 12 MONTHS, HOW MANY TIMES HAVE YOU MOVED WHERE YOU WERE LIVING?: 0

## 2025-01-31 SDOH — SOCIAL STABILITY: SOCIAL INSECURITY: ARE THERE ANY APPARENT SIGNS OF INJURIES/BEHAVIORS THAT COULD BE RELATED TO ABUSE/NEGLECT?: NO

## 2025-01-31 SDOH — ECONOMIC STABILITY: TRANSPORTATION INSECURITY: IN THE PAST 12 MONTHS, HAS LACK OF TRANSPORTATION KEPT YOU FROM MEDICAL APPOINTMENTS OR FROM GETTING MEDICATIONS?: NO

## 2025-01-31 SDOH — SOCIAL STABILITY: SOCIAL INSECURITY: DO YOU FEEL UNSAFE GOING BACK TO THE PLACE WHERE YOU ARE LIVING?: NO

## 2025-01-31 SDOH — HEALTH STABILITY: MENTAL HEALTH: CURRENT SMOKER: 0

## 2025-01-31 SDOH — ECONOMIC STABILITY: HOUSING INSECURITY: DO YOU FEEL UNSAFE GOING BACK TO THE PLACE WHERE YOU LIVE?: NO

## 2025-01-31 SDOH — SOCIAL STABILITY: SOCIAL INSECURITY: WERE YOU ABLE TO COMPLETE ALL THE BEHAVIORAL HEALTH SCREENINGS?: YES

## 2025-01-31 SDOH — SOCIAL STABILITY: SOCIAL INSECURITY: DO YOU FEEL ANYONE HAS EXPLOITED OR TAKEN ADVANTAGE OF YOU FINANCIALLY OR OF YOUR PERSONAL PROPERTY?: NO

## 2025-01-31 ASSESSMENT — PAIN - FUNCTIONAL ASSESSMENT
PAIN_FUNCTIONAL_ASSESSMENT: 0-10

## 2025-01-31 ASSESSMENT — COGNITIVE AND FUNCTIONAL STATUS - GENERAL
HELP NEEDED FOR BATHING: A LOT
MOBILITY SCORE: 18
CLIMB 3 TO 5 STEPS WITH RAILING: A LITTLE
MOVING TO AND FROM BED TO CHAIR: A LITTLE
PATIENT BASELINE BEDBOUND: NO
WALKING IN HOSPITAL ROOM: A LITTLE
TURNING FROM BACK TO SIDE WHILE IN FLAT BAD: A LITTLE
CLIMB 3 TO 5 STEPS WITH RAILING: A LITTLE
MOVING FROM LYING ON BACK TO SITTING ON SIDE OF FLAT BED WITH BEDRAILS: A LITTLE
STANDING UP FROM CHAIR USING ARMS: A LITTLE
PERSONAL GROOMING: A LITTLE
EATING MEALS: A LITTLE
TOILETING: A LITTLE
DRESSING REGULAR LOWER BODY CLOTHING: A LOT
DRESSING REGULAR UPPER BODY CLOTHING: A LITTLE
WALKING IN HOSPITAL ROOM: A LITTLE
STANDING UP FROM CHAIR USING ARMS: A LITTLE
DAILY ACTIVITIY SCORE: 15
DRESSING REGULAR LOWER BODY CLOTHING: A LITTLE
PERSONAL GROOMING: A LITTLE
DRESSING REGULAR UPPER BODY CLOTHING: A LOT
HELP NEEDED FOR BATHING: A LITTLE
TOILETING: A LITTLE
DAILY ACTIVITIY SCORE: 18
TURNING FROM BACK TO SIDE WHILE IN FLAT BAD: A LITTLE
MOVING TO AND FROM BED TO CHAIR: A LITTLE
MOBILITY SCORE: 18
EATING MEALS: A LITTLE
MOVING FROM LYING ON BACK TO SITTING ON SIDE OF FLAT BED WITH BEDRAILS: A LITTLE

## 2025-01-31 ASSESSMENT — PAIN SCALES - GENERAL
PAINLEVEL_OUTOF10: 10 - WORST POSSIBLE PAIN
PAINLEVEL_OUTOF10: 9
PAINLEVEL_OUTOF10: 7
PAINLEVEL_OUTOF10: 7
PAINLEVEL_OUTOF10: 0 - NO PAIN
PAINLEVEL_OUTOF10: 0 - NO PAIN
PAINLEVEL_OUTOF10: 6
PAIN_LEVEL: 2
PAINLEVEL_OUTOF10: 5 - MODERATE PAIN
PAINLEVEL_OUTOF10: 5 - MODERATE PAIN

## 2025-01-31 ASSESSMENT — LIFESTYLE VARIABLES
HOW MANY STANDARD DRINKS CONTAINING ALCOHOL DO YOU HAVE ON A TYPICAL DAY: PATIENT DOES NOT DRINK
HOW OFTEN DO YOU HAVE A DRINK CONTAINING ALCOHOL: NEVER
AUDIT-C TOTAL SCORE: 0
SKIP TO QUESTIONS 9-10: 1
HOW OFTEN DO YOU HAVE 6 OR MORE DRINKS ON ONE OCCASION: NEVER
AUDIT-C TOTAL SCORE: 0

## 2025-01-31 ASSESSMENT — PAIN DESCRIPTION - DESCRIPTORS
DESCRIPTORS: ACHING
DESCRIPTORS: SHARP
DESCRIPTORS: ACHING
DESCRIPTORS: ACHING
DESCRIPTORS: SHARP

## 2025-01-31 ASSESSMENT — ACTIVITIES OF DAILY LIVING (ADL)
WALKS IN HOME: NEEDS ASSISTANCE
FEEDING YOURSELF: INDEPENDENT
HEARING - LEFT EAR: FUNCTIONAL
TOILETING: NEEDS ASSISTANCE
ADEQUATE_TO_COMPLETE_ADL: YES
LACK_OF_TRANSPORTATION: NO
PATIENT'S MEMORY ADEQUATE TO SAFELY COMPLETE DAILY ACTIVITIES?: YES
DRESSING YOURSELF: NEEDS ASSISTANCE
JUDGMENT_ADEQUATE_SAFELY_COMPLETE_DAILY_ACTIVITIES: YES
BATHING: NEEDS ASSISTANCE
GROOMING: NEEDS ASSISTANCE
HEARING - RIGHT EAR: FUNCTIONAL

## 2025-01-31 ASSESSMENT — PATIENT HEALTH QUESTIONNAIRE - PHQ9
SUM OF ALL RESPONSES TO PHQ9 QUESTIONS 1 & 2: 0
2. FEELING DOWN, DEPRESSED OR HOPELESS: NOT AT ALL
1. LITTLE INTEREST OR PLEASURE IN DOING THINGS: NOT AT ALL

## 2025-01-31 ASSESSMENT — PAIN DESCRIPTION - LOCATION: LOCATION: SHOULDER

## 2025-01-31 ASSESSMENT — PAIN DESCRIPTION - ORIENTATION: ORIENTATION: RIGHT

## 2025-01-31 NOTE — OP NOTE
Total Shoulder Reverse Arthroplasty (R) Operative Note     Date: 2025  OR Location: [facility]    Name: Davi Sol, : 1955, Age: 69 y.o., MRN: [id number], Sex: male    Diagnosis  Pre-op Diagnosis      * Rotator cuff tear arthropathy of right shoulder [M75.101, M12.811] Post-op Diagnosis     * Rotator cuff tear arthropathy of right shoulder [M75.101, M12.811]     Procedures  Total Shoulder Reverse Arthroplasty  81539 - [place] ARTHROPLASTY GLENOHUMERAL JOINT TOTAL SHOULDER      Surgeons      * Terrell Michelle - Primary    Resident/Fellow/Other Assistant:  Surgeons and Role:  * No surgeons found with a matching role *    Staff:   Circulator: Clemente Puentes Person: Adolfo  Surgical Assistant: Jessika Puentes Person: Crystal    Anesthesia Staff: Anesthesiologist: Bang Martinez DO  CRNA: YAN Gardiner  Frontline Breaker: YAN Ordaz    Procedure Summary  Anesthesia: Anesthesia type not filed in the log.  ASA: III  Estimated Blood Loss: 100 mL  Intra-op Medications:   Administrations occurring from 1040 to 1335 on 25:   Medication Name Total Dose   dexmedeTOMIDine (Precedex) 100 mcg/mL 2 mL single dose vial 10 mcg   fentaNYL (Sublimaze) injection 50 mcg/mL 100 mcg   glycopyrrolate (Robinul) injection 0.2 mg   LR infusion Cannot be calculated   lidocaine (Xylocaine) injection 2 % 100 mg   midazolam (Versed) injection 1 mg/mL 2 mg   phenylephrine 40 mcg/mL syringe 10 mL 800 mcg   50 mL propofol 10mg/mL 120 mg   rocuronium (ZeMuron) 50 mg/5 mL injection 40 mg   tranexamic acid 1,000 mg/100 mL NS (premix) 1,000 mg   ceFAZolin (Ancef) 2 g in dextrose (iso)  mL 2 g   fentaNYL PF (Sublimaze) injection 50 mcg 50 mcg   midazolam (Versed) injection 2 mg 2 mg              Anesthesia Record               Intraprocedure I/O Totals          Intake    Tranexamic Acid 0.00 mL    The total shown is the total volume documented since Anesthesia Start was filed.    Propofol Drip 0.00 mL     The total shown is the total volume documented since Anesthesia Start was filed.    LR infusion 1000.00 mL    ceFAZolin (Ancef) 2 g in dextrose (iso)  mL 100.00 mL    Total Intake 1100 mL       Output    Est. Blood Loss 100 mL    Total Output 100 mL       Net    Net Volume 1000 mL          Specimen: No specimens collected              Drains and/or Catheters: * None in log *    Tourniquet Times:         Implants:  Implants       Type Name Action Serial No.      Joint GLENOID PIN, TARGETER, 2.8MM, STAINLESS - [id number] Used, Not Implanted       UNIVERS REVERS APEX STEM, SIZE 11  DUPLICATE ENTRY PLEASE TRANSITION TO CATALOG NUMBER [id number] Implanted      Joint SUTURE CUP, UNIVER REVERS, 36 +2 LEFT - [id number] Implanted      Implant POST, MODULAR, 25MM - [id number] Implanted       24MM ARTHREX BASEPLATE, 10-DEGREE FULL AUG, +2 LAT, ST Implanted      Screw SCREW, NON-LOCKING, 4.5MM X 40MM - [id number] Implanted      Screw SCREW, LOCKING, 5.5MM X 24MM - [id number] Implanted      Screw SCREW, LOCKING, 5.5MM X 16MM - [id number] Implanted      Screw SCREW, LOCKING, 5.5MM X 28MM - [id number] Implanted      Screw INSERT, HUMERAL, S/36 +6, CONST - [id number] Wasted      Joint GLENOSPHERE, 36/24 BASEPLATE TAPER - [id number] Wasted      Screw SCREW, NON-LOCKING, 4.5MM X 28MM - [id number] Wasted      Joint SPACER, HUMERAL, 36MM +6MM, - [id number] Implanted      Screw INSERT, HUMERAL, S/36 +3 - [id number] Wasted      Joint GLENOSPHERE, LATERALIZED, 36+ 4 , 24MM BASEPLATE TAPER - [id number] Implanted      Joint INSERT, HUMERAL, S/36 +3, CONST - [id number] Implanted               Findings: Massive retracted supraspinatus tear    Indications: Davi Sol is an 69 y.o. male who is having surgery for right rotator cuff arthropathy.  Plan is to proceed with right reverse total shoulder arthroplasty.  Risks of surgery were explained the patient including postop pain, infection, stiffness, neurovascular injury, need for  additional surgery treatment.  They understand the risks and is willing to proceed.    The patient was seen in the preoperative area. The risks, benefits, complications, treatment options, non-operative alternatives, expected recovery and outcomes were discussed with the patient. The possibilities of reaction to medication, pulmonary aspiration, injury to surrounding structures, bleeding, recurrent infection, the need for additional procedures, failure to diagnose a condition, and creating a complication requiring transfusion or operation were discussed with the patient. The patient concurred with the proposed plan, giving informed consent.  The site of surgery was properly noted/marked if necessary per policy. The patient has been actively warmed in preoperative area. Preoperative antibiotics have been ordered and given within 1 hours of incision. Venous thrombosis prophylaxis have been ordered including bilateral sequential compression devices    Procedure Details:   Patient was taken the op room administered a preoperative block and general anesthesia.  He is placed in the beachchair position.  He is then prepped and draped in usual sterile fashion.  Bony landmarks were identified and marked.  A standard deltopectoral incision was made to the coracoid process extended over the upper arm.  Subcu tissue sharply dissected with the Bovie.  Flaps were developed.  The cephalic vein is identified and carefully dissected laterally with the deltoid muscle.  The deltoid is mobilized around the humeral head.  Clavipectoral fascia was incised.  The exiting nerve was identified and protected out the case.  The bicipital groove was opened the biceps was previously ruptured.  Subscapularis peel was performed.  Stay sutures were inserted.  The inferior capsule was released.  Inferior osteophytes were removed.  Anatomic head cut was performed.  The humerus was then opened with a canal finder to size 6 mm.  Is then broached up to  a size 11 apex stem.  Endcap was inserted.  Attention was directed toward the glenoid.  Subscapularis was released superior medial inferior my finger the axillary nerve.  The peripheral labral tissue was removed.  The inferior capsule was released as well.  Central portion glenoid was marked.  Next based on preoperative planning decision was made to use a size 10 mm augment with the orientation of the 12 clock position.  A drill guide of 10 degrees was used for placement of the guidepin.  Based on preoperative planning and direct measurement decision was made to use a size 25 mm post.  Oblique reaming was performed with care taken preserve the correct orientation.  The central drill was done to 25 mm.  A 4+2 modular glenoid system beds plate with 10 degree augment in the standard orientation was then assembled.  Was then impacted down with care taken to preserve the correct orientation.  A compression screw was placed inferiorly feeding locking screws were placed in the main to the holes.  Peripheral reaming was performed.  Trial glenosphere was inserted.  This was a size 36+0.  Tension structure of the humerus.  The upper end of the humerus was reamed and the anterior position.  Several trials were performed.  Based on trialing decision was made to use a size 24+4 glenosphere.  The glenosphere was then in trial was removed.  The 36+4 glenosphere was then impacted down.  Central screw was inserted.  Attention directed towards humerus.  The upper end of the humerus was then prepared with drill holes for the subscapularis repair.  The size 11 mm stem with a 36 mm cup was then assembled with sutures and the rim of the stem of the cup.  The sutures for the stem of the cup were then passed through the drill holes in the upper and the humerus with suture passer.  This implant was impacted down.  Several trials were performed.  Is felt that a size 6 mm stasis spacer with a 3 mm constrained liner was provided the best fit.   Constrained liner was felt necessary due to the absence of rotator cuff tissue.  A 6 mm spacer was then placed and appeared to fit well.  The 3 mm, constrained liner was then impacted down.  It was then reduced.  Is then felt to be stable with abduction and  rotation.  There is no significant impingement with glenohumeral rotation.  Attention was directed towards the subscapularis repair.  The sutures through the rim of the implant was then placed through the subscapularis equally spaced with a scorpion.  Then then tied down to Arthrex protocol.  There is no significant rotator cuff tissue remaining for rotator interval repair.  Extensor graceful performed.  Vancomycin powder was inserted.  The deltopectoral interval was closed with 0.0 Vicryl.  T obtains tissue was closed with 3.0 Vicryl.  The skin was closed with 4-0 Monocryl subicular suture with knots on the outside of the skin.  Steri-Strips were applied along with silver dressing and a DonJoy sling.  Patient taught the procedure and no complications.  He is taken recovery in stable addition.  Thank you  Complications:  None; patient tolerated the procedure well.    Disposition: PACU - hemodynamically stable.  Condition: stable         Task Performed by RNFA or Surgical Assistant:  An assistant was used throughout the case and assisted in retraction, visualization, and improved the flow of the case.          Additional Details: None    Attending Attestation: I was present and scrubbed for the entire procedure.    Terrell Michelle  Phone Number: [phone]

## 2025-01-31 NOTE — CARE PLAN
Problem: Fall/Injury  Goal: Not fall by end of shift  Outcome: Progressing  Goal: Be free from injury by end of the shift  Outcome: Progressing  Goal: Verbalize understanding of personal risk factors for fall in the hospital  Outcome: Progressing  Goal: Verbalize understanding of risk factor reduction measures to prevent injury from fall in the home  Outcome: Progressing  Goal: Use assistive devices by end of the shift  Outcome: Progressing  Goal: Pace activities to prevent fatigue by end of the shift  Outcome: Progressing   The patient's goals for the shift include      The clinical goals for the shift include

## 2025-01-31 NOTE — NURSING NOTE
Pt arruved in his room, alert andorientedx3, on 2L of oxygen, bed alarm engaged, call light within reach.

## 2025-01-31 NOTE — ANESTHESIA PROCEDURE NOTES
Airway  Date/Time: 1/31/2025 11:32 AM  Urgency: elective    Airway not difficult    Staffing  Performed: attending   Authorized by: Bang Martinez DO    Performed by: JUSTEN Gardiner-CRNA  Patient location during procedure: OR    Indications and Patient Condition  Indications for airway management: anesthesia  Spontaneous ventilation: present  Sedation level: deep  Preoxygenated: yes  Patient position: sniffing  MILS maintained throughout  Mask difficulty assessment: 0 - not attempted    Final Airway Details  Final airway type: endotracheal airway      Successful airway: ETT  Cuffed: yes   Successful intubation technique: direct laryngoscopy  Facilitating devices/methods: intubating stylet  Endotracheal tube insertion site: oral  Blade: Brandt  Blade size: #2  ETT size (mm): 7.5  Cormack-Lehane Classification: grade I - full view of glottis  Placement verified by: chest auscultation and capnometry   Measured from: lips  ETT to lips (cm): 21  Number of attempts at approach: 1  Ventilation between attempts: none  Number of other approaches attempted: 0

## 2025-01-31 NOTE — CONSULTS
Hospitalist Consults    Reason For Consult  Anxiety, hypertension, hyperlipidemia, GERD, depression, BPH, movement disorder, chronic pain    History Of Present Illness  Davi Sol is a 69 y.o. male presenting with right shoulder pain.  Patient is a 69-year-old male patient with past medical history of hypertension, GERD, depression, BPH, movement disorder, chronic pain, anxiety and depression, fibromyalgia, TIA, and osteoporosis; who presented at [facility] for scheduled right shoulder surgery.  Patient is status post total shoulder reverse arthroplasty.  Right shoulder is in a sling, dressing is dry and intact.  Patient is sitting up in bed, alert oriented x 4, denies any chest pain or shortness of breath, denies any nausea vomiting or abdominal pain, denies any dizziness or lightheadedness.  Patient has a history of chronic pain due to multiple back surgeries and spine infections, patient has a pain pump.  Hospitalist service was consulted for the management of patient's chronic illnesses such as GERD, depression, hypertension, BPH, and anxiety.  Past Medical History  He has a past medical history of Anxiety, Arthritis, BPH with elevated PSA, Current every day smoker, Delayed emergence from general anesthesia, Depression, Fibromyalgia, GERD (gastroesophageal reflux disease), History of peritonitis, Hyperlipidemia, Hypertension, Insomnia, Movement disorder, Osteoporosis, Postlaminectomy syndrome, and TIA (transient ischemic attack) (2005).    Surgical History  He has a past surgical history that includes Back surgery; Penile prosthesis implant (2011); Colonoscopy w/ biopsies; Total knee arthroplasty (Left); Mediport insertion, single (2010); Spinal cord stimulator implant; Shoulder arthroscopy (Bilateral); Cholecystectomy (2022); Esophagogastroduodenoscopy; Total shoulder arthroplasty (Left, 2024); Other surgical history; Hernia repair (Bilateral, 07/25/2024); Penile prosthesis implant  (2017); and Mediport removal (2024).     Social History  He reports that he has been smoking cigarettes. He started smoking about 53 years ago. He has a 26.5 pack-year smoking history. He has been exposed to tobacco smoke. He has never used smokeless tobacco. He reports that he does not drink alcohol and does not use drugs.    Family History  Family History   Problem Relation Name Age of Onset    Dementia Mother      Mental illness Mother      Heart attack Father      Diabetes Father      Other (brain tumor) Father      Heart disease Father      Cancer Maternal Grandfather      Leukemia Other      Pancreatic cancer Other          Allergies  Adhesive tape-silicones, Cephalexin, Clindamycin, Linezolid, Adhesive, and Penicillins    Review of Systems  Review of Systems   Constitutional: Negative.    HENT:  Positive for dental problem and trouble swallowing (with medications and dry foods).    Eyes: Negative.    Respiratory: Negative.     Cardiovascular: Negative.    Gastrointestinal: Negative.    Genitourinary:         Intermittent urinary hesitancy and urinary incontinence.   Musculoskeletal:  Positive for arthralgias.  Right shoulder pain  Neurological: Negative.    Psychiatric/Behavioral: Negative.        Physical Exam  Vitals reviewed.   Constitutional:       Comments: Frail appearing   HENT:      Head: Normocephalic and atraumatic.      Mouth/Throat:      Mouth: Mucous membranes are moist.      Pharynx: Oropharynx is clear.   Eyes:      Extraocular Movements: Extraocular movements intact.      Pupils: Pupils are equal, round, and reactive to light.      Comments: Glasses     Cardiovascular:      Rate and Rhythm: Normal rate and regular rhythm.      Heart sounds: Normal heart sounds.   Pulmonary:      Effort: Pulmonary effort is normal.      Breath sounds: Diminished breath sounds.   Abdominal:      General: Bowel sounds are normal.      Palpations: Abdomen is soft.  Pain pump noted in lower  abdomen,  Musculoskeletal:         General: No swelling.      Comments: Status post right shoulder surgery, dressing dry and intact, right arm is in a sling  Skin:     General: Skin is warm and dry.      Comments: Pain pump in place left lower abdomen.   Neurological:      Mental Status: He is alert and oriented to person, place, and time.    Psychiatric:         Mood and Affect: Mood normal.         Behavior: Behavior normal.        Last Recorded Vitals  /69 (BP Location: Left arm, Patient Position: Lying)   Pulse 79   Temp 37 °C (98.6 °F) (Temporal)   Resp 15   Wt 61.2 kg (135 lb)   SpO2 98%     Relevant Results  XR shoulder right 2+ views    Result Date: 1/31/2025  Interpreted By:  Izabella Albarado, STUDY: XR SHOULDER RIGHT 2+ VIEWS 1/31/2025 2:50 pm   INDICATION: Signs/Symptoms:Post op total shoulder   COMPARISON: None available.   ACCESSION NUMBER(S): [id number]   ORDERING CLINICIAN: BETTYE TREJO   TECHNIQUE: Two views of right shoulder   FINDINGS: There is postoperative change from reverse shoulder arthroplasty with components anatomically aligned and with no acute bony abnormality seen.   There is a right subclavian central venous catheter noted which terminates in the SVC.       Satisfactory postoperative appearance of the reverse shoulder arthroplasty on the right.   Signed by: Izabella Albarado 1/31/2025 2:59 PM Dictation workstation:   [id number]    CT shoulder right wo IV contrast    Result Date: 1/13/2025  Interpreted By:  Santana Felipe, STUDY: CT SHOULDER RIGHT WO IV CONTRAST; ;  1/13/2025 11:04 am   INDICATION: Signs/Symptoms:osteoarthritis right shoulder.   ,M19.011 Primary osteoarthritis, right shoulder   COMPARISON: None.   ACCESSION NUMBER(S): [id number]   ORDERING CLINICIAN: BETTYE TREJO   TECHNIQUE: Serial axial CT images obtained of the right shoulder. Images reformatted in the coronal and sagittal projection.   All CT examinations are performed with 1 or more of the following dose  reduction techniques: Automated exposure control, adjustment of mA and/or kv according to patient's size, or use of iterative reconstruction techniques.   FINDINGS: Right glenohumeral articulation demonstrates osteophytosis along inferior margin of the humeral head neck junction. There is no flattening of the humeral head contour. No significant subcortical sclerosis demonstrated. There is prominent subcortical cystic change within the greater tuberosity of the humerus identified measuring 2.0 cm. Osseous glenoid demonstrates mild osteophytosis about the glenoid rim. No erosion of the glenoid bone stock. There is narrowed acromial humeral interval in relation to chronic rotator cuff tear with pseudoarticulation of the humeral head with the undersurface of the acromion. Glenoid demonstrates 7 degrees of retroversion.   Remainder of the visualized scapula is unremarkable. Persistent os acromiale is demonstrated. There is sclerosis about the synchondrosis and cystic change identified. Type 1 acromion is seen.   Included portions visualized right ribs are unremarkable. There is diffuse centrilobular emphysema. There is a pulmonary nodule demonstrated in the right midlung posteriorly measuring 5 mm.               1. Narrowed acromial humeral interval with pseudoarticulation of the humeral head with the undersurface of the acromion in relation to chronic rotator cuff tear.   2. Moderate right glenohumeral joint osteoarthritis.   3. Diffuse centrilobular emphysema.     MACRO: None   Signed by: Santana Felipe 1/13/2025 11:28 AM Dictation workstation:   [id number]    MR shoulder right wo IV contrast    Result Date: 1/10/2025  Interpreted By:  Santana Felipe, STUDY: MR SHOULDER RIGHT WO IV CONTRAST; ;  1/9/2025 4:23 pm   INDICATION: Signs/Symptoms:MRI SHOULDER RIGHT W/WO.   ,M21.331 Wrist drop, right wrist,M25.511 Pain in right shoulder   COMPARISON: 11/18/2011   ACCESSION NUMBER(S): [id number]   ORDERING CLINICIAN:  ALLA HEARD   TECHNIQUE: Multiplanar multisequence MRI obtained of the right shoulder. Multiple sequences limited by patient motion.   FINDINGS: Postsurgical changes are demonstrated status post rotator cuff repair with suture anchor placement demonstrated in the greater tuberosity of the humerus also, suture anchor placement in the region of the lesser tuberosity. Of note, there is large full-thickness recurrent tear of the supraspinatus and infraspinatus tendons from the suture anchor placement with the AP gap of 4.1 cm. Retracted free edge of the tendons demonstrated proximal to the glenoid rim. There is diffuse chronic muscle atrophy of the supraspinatus and infraspinatus tendons.   Subscapularis tendon demonstrates postsurgical changes status post rotator cuff repair with the tendon remaining intact. There is a attenuation of the portion of the tendon which appears chronic. Subscapularis muscle demonstrates diffuse chronic atrophy.   Long head of the biceps tendon is demonstrated about the level of the lesser tuberosity with biceps tenodesis suggested.   Glenohumeral articulation demonstrates osteoarthritic degenerative change with osteophytosis along inferior margin of the humeral head neck junction. There is thinning of the articular cartilage within the superior osseous glenoid. Degenerative appearing irregularity of the glenoid labrum particular superior and posterior labrum. Axillary pouch demonstrates chronic thickened appearance. Quadrilateral space unremarkable   Mild glenohumeral joint fluid. There is a narrowed acromial humeral interval with pseudoarticulation of the humeral head with the undersurface of the acromion. Persistent os acromiale is demonstrated without marrow edema about the synchondrosis, but subcortical cystic change demonstrated. There is remodeling undersurface of the acromion in relation to pseudoarticulation. A type 2 acromion is seen.             1. Large full-thickness recurrent  rotator cuff tear of the supraspinatus and infraspinatus tendons from the suture anchor placement with retracted tendons proximal to the glenoid rim with diffuse chronic muscle atrophy.   2. Intact subscapularis tendon with diffuse muscle atrophy demonstrated   3. Intact biceps tenodesis.   4. Moderate glenohumeral joint osteoarthritis with narrowed acromial humeral interval with pseudoarticulation of the humeral head with the undersurface of the acromion.     MACRO: None   Signed by: Santana Felipe 1/10/2025 8:04 AM Dictation workstation:   [id number]     No results found. However, due to the size of the patient record, not all encounters were searched. Please check Results Review for a complete set of results.     Scheduled medications  acetaminophen, 650 mg, oral, q6h CORY  ceFAZolin, 2 g, intravenous, q8h      Continuous medications  lactated Ringer's, 50 mL/hr, Last Rate: 50 mL/hr (01/31/25 1644)      PRN medications  PRN medications: HYDROmorphone, ondansetron ODT **OR** ondansetron, ondansetron ODT **OR** ondansetron, oxyCODONE, oxyCODONE ER, oxyCODONE-acetaminophen   Assessment/Plan     #Right total shoulder arthroplasty  -Managed by surgery  -Supportive care  -Pain management  -PT, OT    #GERD  -PPI    # Movement disorder  -Resume home medication    # Chronic pain  # Status post multiple spine surgery  # Status post spine infection  -Patient with morphine pump-noted in left lower abdomen  -PT, OT, fall precaution    # Depression/anxiety  -Resume home medication    # BPH  -Resume home medication and monitor urine output    #HLD  - resumed home medication    #HTN  - not on any home medication  - BP stable-> monitor close    # Tobacco use  -Patient denies any shortness of breath, breathing comfortably on room air, not on any home inhaler  - PRN Albuterol    #DVT risk  - AC per surgery    Discharge  Anticipated discharge home per primary team    Manisha Duval, APRN-CNP

## 2025-01-31 NOTE — DISCHARGE INSTRUCTIONS
Keep dressing dry.  Right arm sling.  Okay to move elbow wrist and hand.  No motion right shoulder without physical therapy.  Ice to shoulder as needed.  Follow-up with physical therapy 1 week.  Follow-up with me 10 days.

## 2025-01-31 NOTE — ANESTHESIA PREPROCEDURE EVALUATION
Patient: Davi Sol    Procedure Information       Date/Time: 01/31/25 1040    Procedure: Total Shoulder Reverse Arthroplasty (Right: Shoulder) - arthrex vip    Location: [facility] / [facility]    Surgeons: Terrell Michelle MD            Relevant Problems   Cardiac   (+) Mixed hyperlipidemia      Neuro   (+) Anxiety   (+) Mixed anxiety depressive disorder   (+) Transient ischemic attack      GI   (+) Gastroesophageal reflux disease      /Renal   (+) BPH with elevated PSA      Liver   (+) Chronic cholecystitis with calculus      Endocrine   (+) Hypothyroidism      Musculoskeletal   (+) Chronic pain syndrome   (+) Degeneration of lumbar or lumbosacral intervertebral disc   (+) Osteoarthritis of knee   (+) Osteoarthritis of left knee       Clinical information reviewed:   Tobacco  Allergies    Med Hx  Surg Hx   Fam Hx  Soc Hx        NPO Detail:  NPO/Void Status  Carbohydrate Drink Given Prior to Surgery? : N  Date of Last Liquid: 01/31/25  Time of Last Liquid: 0700  Date of Last Solid: 01/30/25  Time of Last Solid: 2100  Last Intake Type: Clear fluids  Time of Last Void: 0800         Physical Exam    Airway  Mallampati: II  TM distance: >3 FB     Cardiovascular   Rhythm: regular  Rate: normal     Dental    Pulmonary    Abdominal            Anesthesia Plan    History of general anesthesia?: yes  History of complications of general anesthesia?: no    ASA 3     general   (Labs acceptable, EKG sr non-acute changes, echo nl 2016, TSA with LMA3 7-2024 w/o diff.)  The patient is not a current smoker.    intravenous induction   Postoperative administration of opioids is intended.  Anesthetic plan and risks discussed with patient.    Plan discussed with CRNA, attending and CAA.

## 2025-01-31 NOTE — PROGRESS NOTES
Status post reverse total shoulder arthroplasty.    Postoperative plan:    Right arm sling.  Keep dressing dry.  Right ice to right shoulder as needed.  Admit for IV antibiotics, pain control.  Plan to place orders for home care for home physical therapy.  Follow-up with me 10 days.

## 2025-01-31 NOTE — ANESTHESIA POSTPROCEDURE EVALUATION
Patient: Davi Sol    Procedure Summary       Date: 01/31/25 Room / Location: [facility] / [facility]    Anesthesia Start: 1123 Anesthesia Stop:     Procedure: Total Shoulder Reverse Arthroplasty (Right: Shoulder) Diagnosis:       Rotator cuff tear arthropathy of right shoulder      (right rotator cuff arthropathy)    Surgeons: Terrell Michelle MD Responsible Provider: Bang Martinez DO    Anesthesia Type: general ASA Status: 3            Anesthesia Type: general    Vitals Value Taken Time   /62 01/31/25 1431   Temp  01/31/25 1431   Pulse 67 01/31/25 1431   Resp 13 01/31/25 1431   SpO2 99 % 01/31/25 1427   Vitals shown include unfiled device data.    Anesthesia Post Evaluation    Patient location during evaluation: bedside  Patient participation: complete - patient participated  Level of consciousness: awake  Pain score: 2  Pain management: adequate  Airway patency: patent  Two or more strategies used to mitigate risk of obstructive sleep apnea  Cardiovascular status: acceptable  Respiratory status: acceptable  Hydration status: acceptable  Postoperative Nausea and Vomiting: none        No notable events documented.

## 2025-01-31 NOTE — ANESTHESIA PROCEDURE NOTES
Peripheral Block    Patient location during procedure: pre-op  Reason for block: at surgeon's request and post-op pain management  Staffing  Performed: attending and CRNA   Authorized by: Bang Martinez DO    Performed by: Bang Martinez DO  Preanesthetic Checklist  Completed: patient identified, IV checked, site marked, risks and benefits discussed, surgical consent, monitors and equipment checked, pre-op evaluation and timeout performed   Timeout performed at:   Peripheral Block  Patient position: laying flat  Prep: ChloraPrep  Patient monitoring: heart rate, cardiac monitor and continuous pulse ox  Block type: brachial plexus  Laterality: right  Injection technique: single-shot  Guidance: ultrasound guided  Local infiltration: bupivicaine  Infiltration strength: 0.5 %  Dose: 20 mL  Needle  Needle type: short-bevel   Needle gauge: 22 G  Needle length: 10 cm  Needle localization: ultrasound guidance  Needle insertion depth: 5 cm  Assessment  Injection assessment: negative aspiration for heme, no paresthesia on injection, incremental injection and local visualized surrounding nerve on ultrasound  Paresthesia pain: none  Heart rate change: no  Slow fractionated injection: yes  Additional Notes  Time out done. See other emr for block times.  See block consent for laterality. Pt. Tolerated procedure without complication.

## 2025-02-01 ENCOUNTER — DOCUMENTATION (OUTPATIENT)
Dept: HOME HEALTH SERVICES | Facility: HOME HEALTH | Age: 70
End: 2025-02-01
Payer: MEDICARE

## 2025-02-01 VITALS
HEART RATE: 70 BPM | OXYGEN SATURATION: 97 % | RESPIRATION RATE: 18 BRPM | DIASTOLIC BLOOD PRESSURE: 56 MMHG | WEIGHT: 135 LBS | BODY MASS INDEX: 19.99 KG/M2 | HEIGHT: 69 IN | SYSTOLIC BLOOD PRESSURE: 143 MMHG | TEMPERATURE: 98.6 F

## 2025-02-01 PROCEDURE — 2500000002 HC RX 250 W HCPCS SELF ADMINISTERED DRUGS (ALT 637 FOR MEDICARE OP, ALT 636 FOR OP/ED): Mod: MUE | Performed by: NURSE PRACTITIONER

## 2025-02-01 PROCEDURE — 2500000004 HC RX 250 GENERAL PHARMACY W/ HCPCS (ALT 636 FOR OP/ED)

## 2025-02-01 PROCEDURE — 7100000011 HC EXTENDED STAY RECOVERY HOURLY - NURSING UNIT

## 2025-02-01 PROCEDURE — 2500000001 HC RX 250 WO HCPCS SELF ADMINISTERED DRUGS (ALT 637 FOR MEDICARE OP): Performed by: NURSE PRACTITIONER

## 2025-02-01 PROCEDURE — 2500000004 HC RX 250 GENERAL PHARMACY W/ HCPCS (ALT 636 FOR OP/ED): Performed by: ORTHOPAEDIC SURGERY

## 2025-02-01 PROCEDURE — 97161 PT EVAL LOW COMPLEX 20 MIN: CPT | Mod: GP

## 2025-02-01 PROCEDURE — 97530 THERAPEUTIC ACTIVITIES: CPT | Mod: GP

## 2025-02-01 PROCEDURE — 97110 THERAPEUTIC EXERCISES: CPT | Mod: GO

## 2025-02-01 PROCEDURE — 2500000001 HC RX 250 WO HCPCS SELF ADMINISTERED DRUGS (ALT 637 FOR MEDICARE OP): Performed by: ANESTHESIOLOGY

## 2025-02-01 PROCEDURE — 2500000001 HC RX 250 WO HCPCS SELF ADMINISTERED DRUGS (ALT 637 FOR MEDICARE OP): Performed by: ORTHOPAEDIC SURGERY

## 2025-02-01 PROCEDURE — 97165 OT EVAL LOW COMPLEX 30 MIN: CPT | Mod: GO

## 2025-02-01 RX ADMIN — OXYCODONE HYDROCHLORIDE 10 MG: 10 TABLET, FILM COATED, EXTENDED RELEASE ORAL at 08:04

## 2025-02-01 RX ADMIN — HYDROMORPHONE HYDROCHLORIDE 0.2 MG: 0.5 INJECTION, SOLUTION INTRAMUSCULAR; INTRAVENOUS; SUBCUTANEOUS at 04:02

## 2025-02-01 RX ADMIN — TOPIRAMATE 100 MG: 100 TABLET, FILM COATED ORAL at 08:05

## 2025-02-01 RX ADMIN — HYDROMORPHONE HYDROCHLORIDE 0.2 MG: 0.5 INJECTION, SOLUTION INTRAMUSCULAR; INTRAVENOUS; SUBCUTANEOUS at 10:50

## 2025-02-01 RX ADMIN — ATORVASTATIN CALCIUM 20 MG: 20 TABLET, FILM COATED ORAL at 08:05

## 2025-02-01 RX ADMIN — ESCITALOPRAM OXALATE 5 MG: 10 TABLET ORAL at 08:05

## 2025-02-01 RX ADMIN — OXYCODONE HYDROCHLORIDE AND ACETAMINOPHEN 1 TABLET: 5; 325 TABLET ORAL at 02:47

## 2025-02-01 RX ADMIN — GABAPENTIN 800 MG: 400 CAPSULE ORAL at 06:03

## 2025-02-01 RX ADMIN — HYDROMORPHONE HYDROCHLORIDE 0.2 MG: 0.5 INJECTION, SOLUTION INTRAMUSCULAR; INTRAVENOUS; SUBCUTANEOUS at 07:02

## 2025-02-01 RX ADMIN — PANTOPRAZOLE SODIUM 40 MG: 40 TABLET, DELAYED RELEASE ORAL at 06:03

## 2025-02-01 RX ADMIN — CEFAZOLIN SODIUM 2 G: 2 INJECTION, SOLUTION INTRAVENOUS at 03:12

## 2025-02-01 ASSESSMENT — ACTIVITIES OF DAILY LIVING (ADL)
ADL_ASSISTANCE: INDEPENDENT
BATHING_ASSISTANCE: MODERATE
ADL_ASSISTANCE: INDEPENDENT

## 2025-02-01 ASSESSMENT — PAIN SCALES - GENERAL
PAINLEVEL_OUTOF10: 9
PAINLEVEL_OUTOF10: 10 - WORST POSSIBLE PAIN
PAINLEVEL_OUTOF10: 9
PAINLEVEL_OUTOF10: 10 - WORST POSSIBLE PAIN
PAINLEVEL_OUTOF10: 2
PAINLEVEL_OUTOF10: 10 - WORST POSSIBLE PAIN
PAINLEVEL_OUTOF10: 9
PAINLEVEL_OUTOF10: 10 - WORST POSSIBLE PAIN
PAINLEVEL_OUTOF10: 10 - WORST POSSIBLE PAIN

## 2025-02-01 ASSESSMENT — PAIN - FUNCTIONAL ASSESSMENT
PAIN_FUNCTIONAL_ASSESSMENT: 0-10
PAIN_FUNCTIONAL_ASSESSMENT: UNABLE TO SELF-REPORT
PAIN_FUNCTIONAL_ASSESSMENT: 0-10

## 2025-02-01 ASSESSMENT — PAIN DESCRIPTION - LOCATION
LOCATION: SHOULDER
LOCATION: SHOULDER

## 2025-02-01 ASSESSMENT — COGNITIVE AND FUNCTIONAL STATUS - GENERAL
EATING MEALS: A LITTLE
STANDING UP FROM CHAIR USING ARMS: A LITTLE
HELP NEEDED FOR BATHING: A LITTLE
TOILETING: A LITTLE
STANDING UP FROM CHAIR USING ARMS: A LITTLE
DRESSING REGULAR UPPER BODY CLOTHING: A LOT
DRESSING REGULAR LOWER BODY CLOTHING: A LITTLE
DRESSING REGULAR LOWER BODY CLOTHING: A LOT
MOBILITY SCORE: 14
DAILY ACTIVITIY SCORE: 18
MOVING FROM LYING ON BACK TO SITTING ON SIDE OF FLAT BED WITH BEDRAILS: A LITTLE
PERSONAL GROOMING: A LITTLE
PERSONAL GROOMING: A LITTLE
WALKING IN HOSPITAL ROOM: A LITTLE
CLIMB 3 TO 5 STEPS WITH RAILING: A LITTLE
TURNING FROM BACK TO SIDE WHILE IN FLAT BAD: A LOT
EATING MEALS: A LITTLE
MOVING FROM LYING ON BACK TO SITTING ON SIDE OF FLAT BED WITH BEDRAILS: A LOT
DAILY ACTIVITIY SCORE: 14
TURNING FROM BACK TO SIDE WHILE IN FLAT BAD: A LITTLE
HELP NEEDED FOR BATHING: A LOT
MOBILITY SCORE: 18
WALKING IN HOSPITAL ROOM: A LITTLE
MOVING TO AND FROM BED TO CHAIR: A LITTLE
CLIMB 3 TO 5 STEPS WITH RAILING: TOTAL
DRESSING REGULAR UPPER BODY CLOTHING: A LITTLE
MOVING TO AND FROM BED TO CHAIR: A LITTLE
TOILETING: A LOT

## 2025-02-01 ASSESSMENT — PAIN DESCRIPTION - ORIENTATION
ORIENTATION: RIGHT

## 2025-02-01 ASSESSMENT — PAIN DESCRIPTION - DESCRIPTORS: DESCRIPTORS: SHARP

## 2025-02-01 NOTE — PROGRESS NOTES
Physical Therapy    Physical Therapy Evaluation & Treatment    Patient Name: Davi Sol  MRN: [id number]  Department: [address]  Room: [address] [place] [address]  Today's Date: 2/1/2025   Time Calculation  Start Time: 0930  Stop Time: 1000  Time Calculation (min): 30 min    Assessment/Plan   PT Assessment  PT Assessment Results: Decreased strength, Decreased range of motion, Decreased endurance, Impaired balance, Decreased mobility, Decreased coordination, Decreased safety awareness  Rehab Prognosis: Good  Barriers to Discharge Home: Caregiver assistance, Physical needs  Caregiver Assistance: Caregiver assistance needed per identified barriers - however, level of patient's required assistance exceeds assistance available at home  Physical Needs: Ambulating household distances limited by function/safety, Weight bearing precautions unable to be safely maintained  Evaluation/Treatment Tolerance: Patient limited by pain  Medical Staff Made Aware: Yes  Strengths: Ability to acquire knowledge, Housing layout, Premorbid level of function  Barriers to Participation: Comorbidities  End of Session Communication: Bedside nurse  End of Session Patient Position: Bed, 3 rail up, Alarm on   IP OR SWING BED PT PLAN  Inpatient or Swing Bed: Inpatient  PT Plan  Treatment/Interventions: Bed mobility, Transfer training, Gait training, Stair training, Strengthening, Range of motion, Therapeutic exercise, Therapeutic activity  PT Plan: Ongoing PT  PT Frequency: 5 times per week  PT Discharge Recommendations: Moderate intensity level of continued care  Equipment Recommended upon Discharge: Straight cane  PT Recommended Transfer Status: Assist x1  PT - OK to Discharge: Yes      Subjective     General Visit Information:  General  Reason for Referral: decline in mobility, RTSA  Referred By: Dr. Michelle  Past Medical History Relevant to Rehab: anxiety, GERD, hematuria, hypothyroidism, R hand paralysis, osteoporosis, lung nodule, L TSA  Family/Caregiver  Present: No  Prior to Session Communication: Bedside nurse  Patient Position Received: Bed, 4 rail up, Alarm on  Preferred Learning Style: verbal, visual  General Comment: Patient is a 69 year old male admitted s/p R TSA with Dr. Michelle 1/31/2025. Patient is cleared by nursing for therapy. Patient in bed upon arrival and agreeable to participate. +IV  Home Living:  Home Living  Type of Home: House  Lives With: Spouse  Home Adaptive Equipment: Walker rolling or standard, Cane  Home Layout: One level  Home Access: Stairs to enter without rails  Entrance Stairs-Rails: None  Entrance Stairs-Number of Steps: 1  Bathroom Shower/Tub: Tub/shower unit  Bathroom Toilet: Handicapped height  Bathroom Equipment: Grab bars in shower, Shower chair with back  Home Living Comments: patient has available equipment, however, his wife does use them as well  Prior Level of Function:  Prior Function Per Pt/Caregiver Report  Level of [place]: Independent with ADLs and functional transfers, Independent with homemaking with ambulation  Receives Help From: Family  ADL Assistance: Independent  Homemaking Assistance: Independent  Ambulatory Assistance: Independent  Vocational: Retired  Leisure: enjoys TV, [employer] puzzles, yardwork  Hand Dominance: Right  Prior Function Comments: + drives  Precautions:  Precautions  UE Weight Bearing Status: Right Non-Weight Bearing, In Sling When Out of Bed  Medical Precautions: Fall precautions  Post-Surgical Precautions: Right shoulder precautions  Precautions Comment: reviewed R TSA precautions. okay AROM elbow, wrist, hand. ER not to exceed 30 degrees anatomically, passive forward flexion, in sling when out of bed    Objective   Pain:  Pain Assessment  Pain Assessment: 0-10  0-10 (Numeric) Pain Score: 10 - Worst possible pain  Pain Type: Surgical pain  Pain Location: Shoulder  Pain Orientation: Right  Pain Radiating Towards: down his arm  Pain Descriptors: Sharp  Pain Frequency:  Constant/continuous  Pain Onset: Sudden  Patient's Stated Pain Goal: No pain  Pain Interventions: Repositioned  Response to Interventions: No change in pain  Cognition:  Cognition  Overall Cognitive Status: Within Functional Limits  Orientation Level: Oriented X4  Cognition Comments: hyperfocused on pain, requires increased cues for following due to easy distraction  Insight: Mild  Impulsive: Mildly  Processing Speed:  (due to his focus on pain level)    General Assessments:  General Observation  General Observation: pt is in a lot of pain, very tearful at times, unsure of what will happen next, reports he wont have any more surgeries     Activity Tolerance  Endurance: Decreased tolerance for upright activites  Activity Tolerance Comments: limited by pain     Coordination  Upper Body Coordination: R UE in sling  Lower Body Coordination: R foot externally rotated, chronic    Static Sitting Balance  Static Sitting-Balance Support: Feet supported, Left upper extremity supported  Static Sitting-Level of Assistance: Close supervision  Static Sitting-Comment/Number of Minutes: no reports of dizziness, or LOB    Static Standing Balance  Static Standing-Balance Support: Left upper extremity supported  Static Standing-Level of Assistance: Close supervision  Static Standing-Comment/Number of Minutes: no reports of dizziness or LOB, he liked holding on to the IV pole for stability  Dynamic Standing Balance  Dynamic Standing-Balance Support: Left upper extremity supported  Dynamic Standing-Level of Assistance: Close supervision  Dynamic Standing-Balance: Turning  Dynamic Standing-Comments: no reports of dizziness or LOB, he liked holding on to the IV pole for stability  Functional Assessments:  Bed Mobility  Bed Mobility: Yes  Bed Mobility 1  Bed Mobility 1: Sitting to supine  Level of Assistance 1: Contact guard  Bed Mobility Comments 1: HOB elevated, PT assisted scooting patient with draw sheet for positioning and  comfort.  Bed Mobility 2  Bed Mobility  2: Rolling left  Level of Assistance 2: Close supervision  Bed Mobility Comments 2: used bed rail.    Transfers  Transfer: Yes  Transfer 1  Transfer From 1: Stand to  Transfer to 1: Sit  Technique 1: Stand to sit  Transfer Device 1:  (used left hand on IV pole once standing to stabalize himself.)  Transfer Level of Assistance 1: Hand held assistance, Contact guard  Trials/Comments 1: 1x    Ambulation/Gait Training  Ambulation/Gait Training Performed: Yes  Ambulation/Gait Training 1  Surface 1: Level tile  Device 1:  (used left hand on IV pole)  Gait Support Devices: Gait belt  Assistance 1: Contact guard  Quality of Gait 1: Shuffling gait, Decreased step length  Comments/Distance (ft) 1: head down, no dizziness or LOB 20 feet x 1    Stairs  Stairs: No    Extremity/Trunk Assessments:  RUE   RUE : Exceptions to WFL  LUE   LUE: Within Functional Limits  RLE   RLE : Within Functional Limits  LLE   LLE : Within Functional Limits  Treatments:  Therapeutic Exercise  Therapeutic Exercise Performed: Yes  Therapeutic Exercise Activity 1: pt limited by pain, educated on BLE APs, QUAD sets, and Glut sets while in bed.    Therapeutic Activity  Therapeutic Activity Performed: Yes  Therapeutic Activity 1: pt able to static  bathroom for 3-5 mins during tolieting and hand hygiene. PT provides CGA for safety. Pt used left hand on counter top to stabalize himself.    Bed Mobility  Bed Mobility: Yes  Bed Mobility 1  Bed Mobility 1: Sitting to supine  Level of Assistance 1: Contact guard  Bed Mobility Comments 1: HOB elevated, PT assisted scooting patient with draw sheet for positioning and comfort.  Bed Mobility 2  Bed Mobility  2: Rolling left  Level of Assistance 2: Close supervision  Bed Mobility Comments 2: used bed rail.    Ambulation/Gait Training  Ambulation/Gait Training Performed: Yes  Ambulation/Gait Training 1  Surface 1: Level tile  Device 1:  (used left hand on IV  pole)  Gait Support Devices: Gait belt  Assistance 1: Contact guard  Quality of Gait 1: Shuffling gait, Decreased step length  Comments/Distance (ft) 1: head down, no dizziness or LOB 20 feet x 1  Transfers  Transfer: Yes  Transfer 1  Transfer From 1: Stand to  Transfer to 1: Sit  Technique 1: Stand to sit  Transfer Device 1:  (used left hand on IV pole once standing to stabalize himself.)  Transfer Level of Assistance 1: Hand held assistance, Contact guard  Trials/Comments 1: 1x    Outcome Measures:  [facility] Basic Mobility  Turning from your back to your side while in a flat bed without using bedrails: A lot  Moving from lying on your back to sitting on the side of a flat bed without using bedrails: A lot  Moving to and from bed to chair (including a wheelchair): A little  Standing up from a chair using your arms (e.g. wheelchair or bedside chair): A little  To walk in hospital room: A little  Climbing 3-5 steps with railing: Total  Basic Mobility - Total Score: 14    Encounter Problems       Encounter Problems (Active)       Balance       Patient will maintain fair + standing balance to allow for safe mobility upon discharge (Progressing)       Start:  02/01/25    Expected End:  02/15/25               Mobility       Patient will ambulate 75 feet with least restrictive assistive device (LRAD) at close sup level for safety.  (Progressing)       Start:  02/01/25    Expected End:  02/15/25            Patient will ascend and descend 1 step with LRAD with close sup for safety. (Progressing)       Start:  02/01/25    Expected End:  02/15/25             Patient will perform bed mobility at MOD IND level, while maintaining shoulder prec. (Progressing)       Start:  02/01/25    Expected End:  02/15/25                   PT Transfers       Patient will demonstrate safe transfer techniques with all transfers at MOD IND level. (Progressing)       Start:  02/01/25    Expected End:  02/15/25                   Education  Documentation  Precautions, taught by Mel Case PT at 2/1/2025 10:32 AM.  Learner: Patient  Readiness: Eager  Method: Explanation  Response: Needs Reinforcement, Verbalizes Understanding    Mobility Training, taught by Mel Case PT at 2/1/2025 10:32 AM.  Learner: Patient  Readiness: Eager  Method: Explanation  Response: Needs Reinforcement, Verbalizes Understanding    Education Comments  No comments found.    PT educated patient on purpose of PT eval and POC, on use of staff assistance for safety with all mobility and gait, due to his shoulder replacement precautions and high level of pain. Pt reports he understands.

## 2025-02-01 NOTE — CARE PLAN
The patient's goals for the shift include pain control    The clinical goals for the shift include pain management.      Problem: Fall/Injury  Goal: Not fall by end of shift  Outcome: Met  Goal: Be free from injury by end of the shift  Outcome: Met  Goal: Verbalize understanding of personal risk factors for fall in the hospital  Outcome: Met  Goal: Verbalize understanding of risk factor reduction measures to prevent injury from fall in the home  Outcome: Met  Goal: Use assistive devices by end of the shift  Outcome: Met  Goal: Pace activities to prevent fatigue by end of the shift  Outcome: Met     Problem: Deep Vein Thrombosis  Goal: I will remain free from complications of deep vein thrombosis and maintain current level of mobility  2/1/2025 1101 by Leilani Early RN  Outcome: Met  2/1/2025 1101 by Leilani Early RN  Outcome: Progressing     Problem: Pain  Goal: Takes deep breaths with improved pain control throughout the shift  2/1/2025 1101 by Leilani Early RN  Outcome: Met  2/1/2025 1101 by Leilani Early RN  Outcome: Progressing  Goal: Turns in bed with improved pain control throughout the shift  2/1/2025 1101 by Leilani Early RN  Outcome: Met  2/1/2025 1101 by Leilani Early RN  Outcome: Progressing  Goal: Walks with improved pain control throughout the shift  2/1/2025 1101 by Leilani Early RN  Outcome: Met  2/1/2025 1101 by Leilani Early RN  Outcome: Progressing  Goal: Performs ADL's with improved pain control throughout shift  2/1/2025 1101 by Leilani Early RN  Outcome: Met  2/1/2025 1101 by Leilani Early RN  Outcome: Progressing  Goal: Participates in PT with improved pain control throughout the shift  2/1/2025 1101 by Leilani Early RN  Outcome: Met  2/1/2025 1101 by Leilani Early RN  Outcome: Progressing  Goal: Free from opioid side effects throughout the shift  2/1/2025 1101 by Leilani Early RN  Outcome: Met  2/1/2025 1101 by Leilani Earyl RN  Outcome: Progressing  Goal: Free from acute confusion related to pain meds  throughout the shift  2/1/2025 1101 by Leilani Early RN  Outcome: Met  2/1/2025 1101 by Leilani Early RN  Outcome: Progressing

## 2025-02-01 NOTE — PROGRESS NOTES
Spiritual Care Visit  Spiritual Care Request    Reason for Visit:  Routine Visit: Introduction     Request Received From:       Focus of Care:  Visited With: Patient not available         Refer to :          Spiritual Care Assessment    Spiritual Assessment:                      Care Provided:       Sense of Community and or Islam Affiliation:  [place]ism         Addressed Needs/Concerns and/or [place] Through:          Outcome:        Advance Directives:         Spiritual Care Annotation    Annotation:  Ray was having Patient Care when I tired to see him today.  Momo Thurman

## 2025-02-01 NOTE — PROGRESS NOTES
02/01/25 1108   Discharge Planning   Living Arrangements Spouse/significant other   Support Systems Spouse/significant other   Type of Residence Private residence   Number of Stairs to Enter Residence 1   Number of Stairs Within Residence 1   Home or Post Acute Services In home services   Expected Discharge Disposition Home H     TCC Spoke to patient via phone agreeable to HHC at this time   Referral placed for [facility] patient agreeable at this time     SOC 24-48hrs    NO BARRIERS TO DISCHARGE FROM CARE TRANSITIONS

## 2025-02-01 NOTE — HH CARE COORDINATION
Home Care received a Referral for Physical Therapy and Home Health Aide. We have processed the referral for a Start of Care on 02/02-02/03.     If you have any questions or concerns, please feel free to contact us at [phone]. Follow the prompts, enter your five digit zip code, and you will be directed to your care team on [facility] 1.

## 2025-02-01 NOTE — PROGRESS NOTES
"Davi Sol is a 69 y.o. male on day 0 of admission presenting with Shoulder arthritis.    Subjective   Seen this morning.  Complains of pain to the right shoulder.  Worked with physical therapy.       Objective     Physical Exam  Right upper extremity: Shoulder immobilizer in place. Postop dressing clean dry and intact. Able to wiggle fingers. Good cap refill distally.   Last Recorded Vitals  Blood pressure 143/56, pulse 70, temperature 37 °C (98.6 °F), temperature source Temporal, resp. rate 18, height 1.74 m (5' 8.5\"), weight 61.2 kg (135 lb), SpO2 97%.  Intake/Output last 3 Shifts:  I/O last 3 completed shifts:  In: 1500 (24.5 mL/kg) [P.O.:200; I.V.:1200 (19.6 mL/kg); IV Piggyback:100]  Out: 1171 (19.1 mL/kg) [Urine:1071 (0.5 mL/kg/hr); Blood:100]  Weight: 61.2 kg     Relevant Results      Scheduled medications  acetaminophen, 650 mg, oral, q6h CORY  amantadine, 100 mg, oral, BID  [Held by provider] amitriptyline, 10 mg, oral, Nightly  atorvastatin, 20 mg, oral, Daily  escitalopram, 5 mg, oral, Daily  gabapentin, 800 mg, oral, 4x daily  pantoprazole, 40 mg, oral, Daily before breakfast  tamsulosin, 0.4 mg, oral, q PM  topiramate, 100 mg, oral, BID      Continuous medications  lactated Ringer's, 50 mL/hr, Last Rate: 50 mL/hr (01/31/25 1644)      PRN medications  PRN medications: HYDROmorphone, ondansetron ODT **OR** ondansetron, ondansetron ODT **OR** ondansetron, oxyCODONE, oxyCODONE-acetaminophen  No results found. However, due to the size of the patient record, not all encounters were searched. Please check Results Review for a complete set of results.                         Assessment/Plan   Assessment & Plan  Shoulder arthritis    Stop day 1 from right reverse total shoulder arthroplasty.  Plan for physical therapy.  DVT prophylaxis.  Plan for discharge home today with home health therapy.        Rayshawn Estevez MD      "

## 2025-02-01 NOTE — PROGRESS NOTES
Occupational Therapy    Evaluation/Treatment    Patient Name: Davi Sol  MRN: [id number]  Department: [address]  Room: [address] [place] [address]  Today's Date: 02/01/25  Time Calculation  Start Time: 0730  Stop Time: 0755  Time Calculation (min): 25 min     Assessment:  OT Assessment: Patient is a 69 year old male admitted s/p R TSA with Dr. Michelle 1/31/2025. Patient is completing functional txfers and mobility at a CGA level. He is requiring an increased need for assist with ADL/IADL tasks. Patient is limited by pain this date. Recommned skilled OT and available 24/7 supervision/assist at home to maximize patient's safety and independence with daily tasks.  Prognosis: Good  Barriers to Discharge Home: Caregiver assistance, Physical needs  Caregiver Assistance: Caregiver assistance needed per identified barriers - however, level of patient's required assistance exceeds assistance available at home  Physical Needs: High falls risk due to function or environment, Intermittent ADL assistance needed, Intermittent mobility assistance needed  Evaluation/Treatment Tolerance: Patient limited by pain  End of Session Communication: Bedside nurse  End of Session Patient Position: Up in chair, Alarm on  OT Assessment Results: Decreased ADL status, Decreased upper extremity range of motion, Decreased upper extremity strength, Decreased endurance, Decreased safe judgment during ADL, Decreased functional mobility, Decreased gross motor control, Decreased IADLs, Non-functional right upper extremity  Prognosis: Good  Evaluation/Treatment Tolerance: Patient limited by pain  Strengths: Premorbid level of function  Barriers to Participation: Comorbidities  Plan:  Treatment Interventions: ADL retraining, Functional transfer training, UE strengthening/ROM, Endurance training, Patient/family training, Equipment evaluation/education, Neuromuscular reeducation, Fine motor coordination activities, Compensatory technique education, UE splinting  OT  Frequency: 5 times per week  OT Discharge Recommendations: Low intensity level of continued care, Other (Comment) (available 24/7 supervision/assist for safety)  Equipment Recommended upon Discharge:  (TBD)  OT Recommended Transfer Status: Assist of 1  OT - OK to Discharge: Yes  Treatment Interventions: ADL retraining, Functional transfer training, UE strengthening/ROM, Endurance training, Patient/family training, Equipment evaluation/education, Neuromuscular reeducation, Fine motor coordination activities, Compensatory technique education, UE splinting    Subjective   Current Problem:  1. Shoulder arthritis  oxyCODONE-acetaminophen (Percocet) 5-325 mg tablet    Referral to Home Health        General:   OT Received On: 02/01/25  General  Reason for Referral: decline in ADLs, R TSA  Referred By: Dr. Michelle  Past Medical History Relevant to Rehab: anxiety, GERD, hematuria, hypothyroidism, R hand paralysis, osteoporosis, lung nodule, L TSA  Family/Caregiver Present: No  Prior to Session Communication: Bedside nurse  Patient Position Received: Bed, 4 rail up, Alarm on  Preferred Learning Style: verbal, visual  General Comment: Patient is a 69 year old male admitted s/p R TSA with Dr. Michelle 1/31/2025. Patient is cleared by nursing for therapy. Patient in bed upon arrival and agreeable to participate. +IV   Precautions:  UE Weight Bearing Status: Right Non-Weight Bearing, In Sling When Out of Bed (in sling)  Medical Precautions: Fall precautions  Post-Surgical Precautions: Right shoulder precautions  Precautions Comment: reviewed R TSA precautions. okay AROM elbow, wrist, hand. ER not to exceed 30 degrees anatomically, passive forward flexion, in sling when out of bed    Pain:  Pain Assessment  Pain Assessment: 0-10  0-10 (Numeric) Pain Score: 10 - Worst possible pain  Pain Type: Surgical pain  Pain Location: Shoulder  Pain Orientation: Right  Pain Interventions: Repositioned, Ambulation/increased activity (RN  aware)  Response to Interventions: No change in pain    Objective   Cognition:  Cognition Comments: hyperfocused on pain, requires increased cues for following due to easy distraction  Safety/Judgement:  (slight decrease)  Insight: Mild  Impulsive: Mildly     Home Living:  Type of Home: House  Lives With: Spouse  Home Adaptive Equipment: Walker rolling or standard, Cane (2WW, and rollator)  Home Layout: One level  Home Access: Stairs to enter without rails  Entrance Stairs-Rails: None  Entrance Stairs-Number of Steps: 1  Bathroom Shower/Tub: Tub/shower unit  Bathroom Toilet: Handicapped height  Bathroom Equipment: Grab bars in shower, Shower chair with back  Home Living Comments: patient has available equipment, however, his wife does use them as well  Prior Function:  Level of [place]: Independent with ADLs and functional transfers, Independent with homemaking with ambulation  Receives Help From: Family  ADL Assistance: Independent  Homemaking Assistance: Independent  Ambulatory Assistance: Independent  Vocational: Retired  Hand Dominance: Right  Prior Function Comments: + drives  IADL History:  Homemaking Responsibilities: Yes  IADL Comments: patient reports he is independent with ADLs/IADLs at baseline  ADL:  Eating Assistance: Stand by  Eating Deficit: Setup (per clinical judgement, assistance to open containers)  Grooming Assistance: Stand by  Grooming Deficit: Steadying (standing sinkside)  Bathing Assistance: Moderate  Bathing Deficit: Setup, Supervision/safety, Left arm, Buttocks, Use of adaptive equipment (per clinical judgement)  UE Dressing Assistance: Maximal  UE Dressing Deficit: Thread RUE, Thread LUE, Pull over head, Pull around back (requires assistance for sling management as well)  LE Dressing Assistance: Maximal  LE Dressing Deficit: Setup, Steadying, Thread RLE into pants, Thread LLE into pants, Pull up over hips (per clinical judgement)  Toileting Assistance with Device: Stand by  Toileting  Deficit: Steadying (standing at toilet)    Activity Tolerance:  Endurance: Decreased tolerance for upright activites  Activity Tolerance Comments: limited by pain    Bed Mobility/Transfers: Bed Mobility  Bed Mobility: Yes  Bed Mobility 1  Bed Mobility 1: Supine to sitting  Level of Assistance 1: Contact guard  Bed Mobility Comments 1: head of bed elevated, effortful due to pain    Transfers  Transfer: Yes  Transfer 1  Transfer From 1: Bed to  Transfer to 1: Stand  Technique 1: Sit to stand  Transfer Level of Assistance 1: Hand held assistance, Contact guard    Toilet Transfers  Toilet Transfer Type: To and from  Toilet Transfer to: Standard toilet  Toilet Transfer Technique: Ambulating  Toilet Transfers: Contact guard  Toilet Transfers Comments: standing at commode    Functional Mobility:  Functional Mobility  Functional Mobility Performed: Yes  Functional Mobility 1  Surface 1: Level tile  Device 1: No device  Assistance 1: Contact guard  Comments 1: holds on to IV pole. CGA for safety  Sitting Balance:  Static Sitting Balance  Static Sitting-Balance Support: Feet supported  Static Sitting-Level of Assistance: Close supervision  Standing Balance:  Static Standing Balance  Static Standing-Balance Support: No upper extremity supported  Static Standing-Level of Assistance: Contact guard    Therapy/Activity: Therapeutic Exercise  Therapeutic Exercise Performed: Yes  Therapeutic Exercise Activity 1: patient limited by pain. 1x5 R UE AAROM elbow flex/ext, AROM wrist flex/ext and finger flex/ext. reviewed HEP with patient    Sensation:  Sensation Comment: patient reports numbness/tingling in R UE. reports hx of R hand paralysis  Strength:  Strength Comments: R UE not tested due to precautions. L UE at least >/= 3/5 grossly  Coordination:  Movements are Fluid and Coordinated: No  Upper Body Coordination: R UE in sling  Lower Body Coordination: R foot externally rotated, chronic   Hand Function:  Hand Function  Gross Grasp:  Impaired (R UE)  Coordination: Impaired (R UE)  Extremities: RUE   RUE : Exceptions to WFL (not tested, in sling, decreased elbow AROM) and LUE   LUE: Within Functional Limits    Outcome Measures: [facility] Daily Activity  Putting on and taking off regular lower body clothing: A lot  Bathing (including washing, rinsing, drying): A lot  Putting on and taking off regular upper body clothing: A lot  Toileting, which includes using toilet, bedpan or urinal: A lot  Taking care of personal grooming such as brushing teeth: A little  Eating Meals: A little  Daily Activity - Total Score: 14    Education Documentation  Body Mechanics, taught by Kari Britton OT at 2/1/2025  8:56 AM.  Learner: Patient  Readiness: Acceptance  Method: Explanation, Demonstration  Response: Needs Reinforcement  Comment: Education on R UE HEP, sling management, precautions    Precautions, taught by Kari Britton OT at 2/1/2025  8:56 AM.  Learner: Patient  Readiness: Acceptance  Method: Explanation, Demonstration  Response: Needs Reinforcement  Comment: Education on R UE HEP, sling management, precautions    Home Exercise Program, taught by Kari Britton OT at 2/1/2025  8:56 AM.  Learner: Patient  Readiness: Acceptance  Method: Explanation, Demonstration  Response: Needs Reinforcement  Comment: Education on R UE HEP, sling management, precautions    ADL Training, taught by Kari Britton OT at 2/1/2025  8:56 AM.  Learner: Patient  Readiness: Acceptance  Method: Explanation, Demonstration  Response: Needs Reinforcement  Comment: Education on R UE HEP, sling management, precautions    Education Comments  No comments found.      Goals:  Encounter Problems       Encounter Problems (Active)       OT Goals       ADLs (Progressing)       Start:  02/01/25    Expected End:  02/15/25       Patient will complete ADL tasks, with modified independence, using AE need in order to increase patient's safety and independence with self-care tasks.           Functional Transfers (Progressing)       Start:  02/01/25    Expected End:  02/15/25       Patient will complete functional transfers with modified independence, using least restrictive device, in order to increase patient's safety and independence with daily tasks.          Functional Mobility (Progressing)       Start:  02/01/25    Expected End:  02/15/25       Patient will demonstrate the ability to complete item retrieval and functional mobility with modified independence, in order to increase safety and independence with daily tasks.          HEP (Progressing)       Start:  02/01/25    Expected End:  02/15/25       Patient will demonstrate the ability to complete R UE HEP and recall precautions, in order to maximize safety and independence with daily tasks.         Activity Tolerance  (Progressing)       Start:  02/01/25    Expected End:  02/15/25       Patient will demonstrate the ability to participate in functional activity at least >/= 25 minutes in order to increase patient's safety and independence with daily tasks.

## 2025-02-01 NOTE — CARE PLAN
Problem: Fall/Injury  Goal: Not fall by end of shift  Outcome: Progressing  Goal: Be free from injury by end of the shift  Outcome: Progressing  Goal: Verbalize understanding of personal risk factors for fall in the hospital  Outcome: Progressing  Goal: Verbalize understanding of risk factor reduction measures to prevent injury from fall in the home  Outcome: Progressing  Goal: Use assistive devices by end of the shift  Outcome: Progressing  Goal: Pace activities to prevent fatigue by end of the shift  Outcome: Progressing   The patient's goals for the shift include pain control    The clinical goals for the shift include pain management.

## 2025-02-03 NOTE — DISCHARGE SUMMARY
Discharge Diagnosis  Shoulder arthritis    Issues Requiring Follow-Up  shoulder    Test Results Pending At Discharge  Pending Labs       No current pending labs.            Hospital Course   Surgery, pain control, PT    Pertinent Physical Exam At Time of Discharge  Physical Exam  Deferred  Home Medications     Medication List      START taking these medications     oxyCODONE-acetaminophen 5-325 mg tablet; Commonly known as: Percocet;   Take 1 tablet by mouth every 6 hours if needed for severe pain (7 - 10)   for up to 6 days.     CONTINUE taking these medications     amantadine 100 mg capsule; Commonly known as: Symmetrel; Take 1 capsule   (100 mg) by mouth 2 times a day.   amitriptyline 10 mg tablet; Commonly known as: Elavil; TAKE 1 TABLET BY   MOUTH AT BEDTIME   atorvastatin 20 mg tablet; Commonly known as: Lipitor; TAKE ONE TABLET   BY MOUTH ONCE DAILY   escitalopram 5 mg tablet; Commonly known as: Lexapro; TAKE 1 TABLET (5   MG) BY MOUTH ONCE DAILY.   gabapentin 400 mg capsule; Commonly known as: Neurontin; Take 2 capsules   (800 mg) by mouth 4 times a day.   ibuprofen 200 mg tablet   Pump Patient Supplied Medication   solifenacin 5 mg tablet; Commonly known as: VESIcare; Take 1 tablet (5   mg) by mouth once daily. Swallow tablet whole; do not crush, chew, or   split.   * tamsulosin 0.4 mg 24 hr capsule; Commonly known as: Flomax; Take 1   capsule (0.4 mg) by mouth once daily in the evening.   * tamsulosin 0.4 mg 24 hr capsule; Commonly known as: Flomax; Take 1   capsule (0.4 mg) by mouth once daily.   topiramate 100 mg tablet; Commonly known as: Topamax; Take 1 tablet (100   mg) by mouth 2 times a day.   traZODone 150 mg tablet; Commonly known as: Desyrel; TAKE 1 TABLET BY   MOUTH AT BEDTIME AS NEEDED FOR SLEEP   Vitamin D3 25 MCG (1000 UT) tablet; Generic drug: cholecalciferol  * This list has 2 medication(s) that are the same as other medications   prescribed for you. Read the directions carefully, and ask  your doctor or   other care provider to review them with you.     ASK your doctor about these medications     chlorhexidine 0.12 % solution; Commonly known as: Peridex; Use as   directed.; Ask about: Should I take this medication?   tiZANidine 2 mg tablet; Commonly known as: Zanaflex; Take 1 tablet (2   mg) by mouth every 6 hours if needed for muscle spasms.       Outpatient Follow-Up  Future Appointments   Date Time Provider Department Center   2/4/2025 To Be Determined Jaci Walsh, PT [facility]   2/19/2025  1:00 PM Bridger Roldan MD [id number] [facility]   6/18/2025 10:00 AM Jc Rodriguez MD [id number] [facility]       Terrell Michelle MD

## 2025-02-04 ENCOUNTER — HOME CARE VISIT (OUTPATIENT)
Dept: HOME HEALTH SERVICES | Facility: HOME HEALTH | Age: 70
End: 2025-02-04
Payer: MEDICARE

## 2025-02-04 VITALS
RESPIRATION RATE: 18 BRPM | TEMPERATURE: 98.9 F | HEART RATE: 75 BPM | DIASTOLIC BLOOD PRESSURE: 62 MMHG | SYSTOLIC BLOOD PRESSURE: 110 MMHG | OXYGEN SATURATION: 97 %

## 2025-02-04 PROCEDURE — G0151 HHCP-SERV OF PT,EA 15 MIN: HCPCS | Mod: HHH

## 2025-02-04 PROCEDURE — 169592 NO-PAY CLAIM PROCEDURE

## 2025-02-04 SDOH — HEALTH STABILITY: PHYSICAL HEALTH
EXERCISE COMMENTS: ONATION/SUPINATION  - WRIST FLEXION/EXTENSIN  - WRIST ULNAR/RADIAL DEVIATION  - FINGER FLEXION/EXTENSION  - FINGER ABDUCTION/ADDUCTION    PATIENT DEMOS GOOD COMPREHENSION OF EXERCISES AS HE HAD A SHOULDER REPLACEMENT OF HIS L SHOULDER LAST YEAR.

## 2025-02-04 SDOH — HEALTH STABILITY: PHYSICAL HEALTH
EXERCISE COMMENTS: PT INSTRUCTED PATIENT IN SUPINE EXERCISES 1X10:  - ANKLE PUMPS  - GLUT SETS  - QUAD SETS  - HEELSLIDES  - HIP ABDUCTION    PT ALSO INSTRUCTED PATIENT IN R UE EXERCISES (NO ROM PERMITTED OF R SHOULDER) INCLUDING:  - ELBOW FLEXION/EXTENSION  - ELBOW PR

## 2025-02-04 ASSESSMENT — ENCOUNTER SYMPTOMS
PAIN LOCATION - RELIEVING FACTORS: REST
PAIN: 1
HIGHEST PAIN SEVERITY IN PAST 24 HOURS: 10/10
PAIN LOCATION - EXACERBATING FACTORS: INCORRECT MOVEMENT
PERSON REPORTING PAIN: PATIENT
LOWEST PAIN SEVERITY IN PAST 24 HOURS: 4/10
PAIN LOCATION - PAIN SEVERITY: 7/10
PAIN LOCATION: RIGHT SHOULDER

## 2025-02-04 ASSESSMENT — ACTIVITIES OF DAILY LIVING (ADL)
OASIS_M1830: 05
ENTERING_EXITING_HOME: STAND BY ASSIST
AMBULATION ASSISTANCE ON FLAT SURFACES: 1

## 2025-02-05 ENCOUNTER — HOME CARE VISIT (OUTPATIENT)
Dept: HOME HEALTH SERVICES | Facility: HOME HEALTH | Age: 70
End: 2025-02-05
Payer: MEDICARE

## 2025-02-05 PROCEDURE — G0152 HHCP-SERV OF OT,EA 15 MIN: HCPCS | Mod: HHH

## 2025-02-05 PROCEDURE — G0156 HHCP-SVS OF AIDE,EA 15 MIN: HCPCS | Mod: HHH

## 2025-02-05 ASSESSMENT — ENCOUNTER SYMPTOMS
PAIN LOCATION: RIGHT SHOULDER
PAIN: 1
PERSON REPORTING PAIN: PATIENT

## 2025-02-05 ASSESSMENT — ACTIVITIES OF DAILY LIVING (ADL)
BATHING_CURRENT_FUNCTION: MINIMUM ASSIST
PHYSICAL TRANSFERS ASSESSED: 1
PREPARING MEALS: DEPENDENT
AMBULATION ASSISTANCE: SUPERVISION
LIGHT HOUSEKEEPING: NEEDS ASSISTANCE
DRESSING_UB_CURRENT_FUNCTION: MINIMUM ASSIST
GROOMING_CURRENT_FUNCTION: MINIMUM ASSIST
AMBULATION ASSISTANCE: 1
BATHING ASSESSED: 1
GROOMING ASSESSED: 1
HOUSEKEEPING ASSESSED: 1
DRESSING_LB_CURRENT_FUNCTION: MINIMUM ASSIST
TOILETING: 1
TOILETING: MINIMUM ASSIST
CURRENT_FUNCTION: MINIMUM ASSIST

## 2025-02-11 ENCOUNTER — HOME CARE VISIT (OUTPATIENT)
Dept: HOME HEALTH SERVICES | Facility: HOME HEALTH | Age: 70
End: 2025-02-11
Payer: MEDICARE

## 2025-02-11 PROCEDURE — G0156 HHCP-SVS OF AIDE,EA 15 MIN: HCPCS | Mod: HHH

## 2025-02-12 ENCOUNTER — HOME CARE VISIT (OUTPATIENT)
Dept: HOME HEALTH SERVICES | Facility: HOME HEALTH | Age: 70
End: 2025-02-12
Payer: MEDICARE

## 2025-02-12 PROCEDURE — G0152 HHCP-SERV OF OT,EA 15 MIN: HCPCS | Mod: HHH

## 2025-02-12 ASSESSMENT — ENCOUNTER SYMPTOMS
PAIN LOCATION - PAIN SEVERITY: 8/10
PAIN LOCATION: RIGHT SHOULDER
LOWEST PAIN SEVERITY IN PAST 24 HOURS: 8/10
PERSON REPORTING PAIN: PATIENT
HIGHEST PAIN SEVERITY IN PAST 24 HOURS: 8/10
PAIN LOCATION - PAIN FREQUENCY: INTERMITTENT
PAIN LOCATION - PAIN QUALITY: ACHING
PAIN: 1

## 2025-02-14 ENCOUNTER — HOME CARE VISIT (OUTPATIENT)
Dept: HOME HEALTH SERVICES | Facility: HOME HEALTH | Age: 70
End: 2025-02-14
Payer: MEDICARE

## 2025-02-14 PROCEDURE — G0152 HHCP-SERV OF OT,EA 15 MIN: HCPCS | Mod: HHH

## 2025-02-14 PROCEDURE — G0156 HHCP-SVS OF AIDE,EA 15 MIN: HCPCS | Mod: HHH

## 2025-02-14 ASSESSMENT — ACTIVITIES OF DAILY LIVING (ADL)
BATHING_CURRENT_FUNCTION: MINIMUM ASSIST
BATHING ASSESSED: 1

## 2025-02-14 ASSESSMENT — ENCOUNTER SYMPTOMS
PAIN SEVERITY GOAL: 0/10
PAIN LOCATION - PAIN SEVERITY: 5/10
SUBJECTIVE PAIN PROGRESSION: UNCHANGED
PAIN LOCATION - PAIN QUALITY: ACHING
LOWEST PAIN SEVERITY IN PAST 24 HOURS: 5/10
PERSON REPORTING PAIN: PATIENT
PAIN: 1
PAIN LOCATION - PAIN FREQUENCY: INTERMITTENT
HIGHEST PAIN SEVERITY IN PAST 24 HOURS: 5/10
PAIN LOCATION: RIGHT SHOULDER

## 2025-02-18 ENCOUNTER — HOME CARE VISIT (OUTPATIENT)
Dept: HOME HEALTH SERVICES | Facility: HOME HEALTH | Age: 70
End: 2025-02-18
Payer: MEDICARE

## 2025-02-18 PROCEDURE — G0156 HHCP-SVS OF AIDE,EA 15 MIN: HCPCS | Mod: HHH

## 2025-02-18 PROCEDURE — G0152 HHCP-SERV OF OT,EA 15 MIN: HCPCS | Mod: HHH

## 2025-02-18 ASSESSMENT — ACTIVITIES OF DAILY LIVING (ADL)
BATHING ASSESSED: 1
PHYSICAL TRANSFERS ASSESSED: 1
DRESSING_UB_CURRENT_FUNCTION: MINIMUM ASSIST
CURRENT_FUNCTION: INDEPENDENT
BATHING_CURRENT_FUNCTION: MINIMUM ASSIST

## 2025-02-18 ASSESSMENT — ENCOUNTER SYMPTOMS
PAIN LOCATION - PAIN SEVERITY: 2/10
PAIN LOCATION: RIGHT SHOULDER
HIGHEST PAIN SEVERITY IN PAST 24 HOURS: 2/10
PAIN SEVERITY GOAL: 0/10
PERSON REPORTING PAIN: PATIENT
PAIN LOCATION - PAIN FREQUENCY: INTERMITTENT
LOWEST PAIN SEVERITY IN PAST 24 HOURS: 2/10
PAIN LOCATION - PAIN QUALITY: ACHING
PAIN: 1
SUBJECTIVE PAIN PROGRESSION: UNCHANGED

## 2025-02-19 ENCOUNTER — APPOINTMENT (OUTPATIENT)
Dept: NEUROLOGY | Facility: CLINIC | Age: 70
End: 2025-02-19
Payer: MEDICARE

## 2025-02-20 ENCOUNTER — HOME CARE VISIT (OUTPATIENT)
Dept: HOME HEALTH SERVICES | Facility: HOME HEALTH | Age: 70
End: 2025-02-20
Payer: MEDICARE

## 2025-02-20 PROCEDURE — G0152 HHCP-SERV OF OT,EA 15 MIN: HCPCS | Mod: HHH

## 2025-02-20 ASSESSMENT — ENCOUNTER SYMPTOMS
PAIN SEVERITY GOAL: 2/10
PAIN LOCATION: RIGHT SHOULDER
PERSON REPORTING PAIN: PATIENT
PAIN: 1
LOWEST PAIN SEVERITY IN PAST 24 HOURS: 7/10
PAIN LOCATION - PAIN QUALITY: ACHING
SUBJECTIVE PAIN PROGRESSION: UNCHANGED
PAIN LOCATION - PAIN FREQUENCY: INTERMITTENT
PAIN LOCATION - PAIN SEVERITY: 7/10
HIGHEST PAIN SEVERITY IN PAST 24 HOURS: 7/10

## 2025-02-20 ASSESSMENT — ACTIVITIES OF DAILY LIVING (ADL): DRESSING_UB_CURRENT_FUNCTION: INDEPENDENT

## 2025-02-21 ENCOUNTER — HOME CARE VISIT (OUTPATIENT)
Dept: HOME HEALTH SERVICES | Facility: HOME HEALTH | Age: 70
End: 2025-02-21
Payer: MEDICARE

## 2025-02-25 ENCOUNTER — HOME CARE VISIT (OUTPATIENT)
Dept: HOME HEALTH SERVICES | Facility: HOME HEALTH | Age: 70
End: 2025-02-25
Payer: MEDICARE

## 2025-02-25 PROCEDURE — G0152 HHCP-SERV OF OT,EA 15 MIN: HCPCS | Mod: HHH

## 2025-02-25 PROCEDURE — G0156 HHCP-SVS OF AIDE,EA 15 MIN: HCPCS | Mod: HHH

## 2025-02-25 ASSESSMENT — ENCOUNTER SYMPTOMS
PAIN LOCATION - PAIN SEVERITY: 5/10
PAIN SEVERITY GOAL: 0/10
HIGHEST PAIN SEVERITY IN PAST 24 HOURS: 5/10
SUBJECTIVE PAIN PROGRESSION: UNCHANGED
PAIN LOCATION - PAIN FREQUENCY: INTERMITTENT
PAIN: 1
PAIN LOCATION: RIGHT SHOULDER
LOWEST PAIN SEVERITY IN PAST 24 HOURS: 5/10
PERSON REPORTING PAIN: PATIENT
PAIN LOCATION - PAIN QUALITY: ACHING

## 2025-02-27 ENCOUNTER — HOME CARE VISIT (OUTPATIENT)
Dept: HOME HEALTH SERVICES | Facility: HOME HEALTH | Age: 70
End: 2025-02-27
Payer: MEDICARE

## 2025-02-27 PROCEDURE — G0152 HHCP-SERV OF OT,EA 15 MIN: HCPCS | Mod: HHH

## 2025-02-27 ASSESSMENT — ENCOUNTER SYMPTOMS
HIGHEST PAIN SEVERITY IN PAST 24 HOURS: 5/10
PAIN LOCATION - PAIN QUALITY: ACHING
PERSON REPORTING PAIN: PATIENT
PAIN LOCATION - PAIN FREQUENCY: INTERMITTENT
PAIN LOCATION: RIGHT SHOULDER
PAIN SEVERITY GOAL: 1/10
PAIN: 1
PAIN LOCATION - PAIN SEVERITY: 5/10
LOWEST PAIN SEVERITY IN PAST 24 HOURS: 5/10
SUBJECTIVE PAIN PROGRESSION: UNCHANGED

## 2025-02-28 ENCOUNTER — APPOINTMENT (OUTPATIENT)
Dept: LAB | Facility: HOSPITAL | Age: 70
End: 2025-02-28
Payer: MEDICARE

## 2025-02-28 ENCOUNTER — APPOINTMENT (OUTPATIENT)
Dept: PRIMARY CARE | Facility: CLINIC | Age: 70
End: 2025-02-28
Payer: MEDICARE

## 2025-02-28 VITALS
WEIGHT: 129 LBS | TEMPERATURE: 98 F | DIASTOLIC BLOOD PRESSURE: 64 MMHG | SYSTOLIC BLOOD PRESSURE: 120 MMHG | OXYGEN SATURATION: 100 % | HEART RATE: 65 BPM | BODY MASS INDEX: 19.33 KG/M2

## 2025-02-28 DIAGNOSIS — G98.8 PARANEOPLASTIC NEUROLOGIC DISORDER (MULTI): Primary | ICD-10-CM

## 2025-02-28 DIAGNOSIS — C80.1 PARANEOPLASTIC NEUROLOGIC DISORDER (MULTI): Primary | ICD-10-CM

## 2025-02-28 DIAGNOSIS — M96.1 POST LAMINECTOMY SYNDROME: ICD-10-CM

## 2025-02-28 DIAGNOSIS — Z12.5 ENCOUNTER FOR PROSTATE CANCER SCREENING: ICD-10-CM

## 2025-02-28 PROCEDURE — G2211 COMPLEX E/M VISIT ADD ON: HCPCS | Performed by: STUDENT IN AN ORGANIZED HEALTH CARE EDUCATION/TRAINING PROGRAM

## 2025-02-28 PROCEDURE — 1159F MED LIST DOCD IN RCRD: CPT | Performed by: STUDENT IN AN ORGANIZED HEALTH CARE EDUCATION/TRAINING PROGRAM

## 2025-02-28 PROCEDURE — 84155 ASSAY OF PROTEIN SERUM: CPT

## 2025-02-28 PROCEDURE — 83521 IG LIGHT CHAINS FREE EACH: CPT

## 2025-02-28 PROCEDURE — 1126F AMNT PAIN NOTED NONE PRSNT: CPT | Performed by: STUDENT IN AN ORGANIZED HEALTH CARE EDUCATION/TRAINING PROGRAM

## 2025-02-28 PROCEDURE — 99214 OFFICE O/P EST MOD 30 MIN: CPT | Performed by: STUDENT IN AN ORGANIZED HEALTH CARE EDUCATION/TRAINING PROGRAM

## 2025-02-28 PROCEDURE — 1123F ACP DISCUSS/DSCN MKR DOCD: CPT | Performed by: STUDENT IN AN ORGANIZED HEALTH CARE EDUCATION/TRAINING PROGRAM

## 2025-02-28 PROCEDURE — 4004F PT TOBACCO SCREEN RCVD TLK: CPT | Performed by: STUDENT IN AN ORGANIZED HEALTH CARE EDUCATION/TRAINING PROGRAM

## 2025-02-28 PROCEDURE — 84165 PROTEIN E-PHORESIS SERUM: CPT

## 2025-02-28 RX ORDER — AMITRIPTYLINE HYDROCHLORIDE 10 MG/1
10 TABLET, FILM COATED ORAL NIGHTLY
Qty: 100 TABLET | Refills: 1 | Status: SHIPPED | OUTPATIENT
Start: 2025-02-28

## 2025-02-28 ASSESSMENT — ENCOUNTER SYMPTOMS
DYSURIA: 0
PALPITATIONS: 0
DIZZINESS: 0
MYALGIAS: 0
FEVER: 0
HEADACHES: 0
SHORTNESS OF BREATH: 0
SINUS PRESSURE: 0
WOUND: 0
ARTHRALGIAS: 0
NERVOUS/ANXIOUS: 0
SLEEP DISTURBANCE: 0
DIARRHEA: 0
NAUSEA: 0
CONSTIPATION: 0
FATIGUE: 1
LIGHT-HEADEDNESS: 0
DYSPHORIC MOOD: 0
CHILLS: 0
RHINORRHEA: 0
WHEEZING: 0
FREQUENCY: 0
SINUS PAIN: 0
COUGH: 0
POLYDIPSIA: 0
VOMITING: 0

## 2025-02-28 ASSESSMENT — PAIN SCALES - GENERAL: PAINLEVEL_OUTOF10: 0-NO PAIN

## 2025-02-28 NOTE — PROGRESS NOTES
Subjective   Patient ID: Davi Sol is a 69 y.o. male who presents for Follow-up.    Here today in follow-up.  Was referred back from his neurologist for presumed cancer of unknown primary tumor.    Patient with some confusion regarding recent lab results.      Review of Systems   Constitutional:  Positive for fatigue. Negative for chills and fever.   HENT:  Negative for congestion, hearing loss, rhinorrhea, sinus pressure, sinus pain and tinnitus.    Eyes:  Negative for visual disturbance.   Respiratory:  Negative for cough, shortness of breath and wheezing.    Cardiovascular:  Positive for chest pain (Reproducible with palpation). Negative for palpitations and leg swelling.   Gastrointestinal:  Negative for constipation, diarrhea, nausea and vomiting.   Endocrine: Negative for cold intolerance, heat intolerance, polydipsia and polyuria.   Genitourinary:  Negative for dysuria, frequency and urgency.   Musculoskeletal:  Negative for arthralgias and myalgias.   Skin:  Negative for pallor, rash and wound.   Neurological:  Negative for dizziness, light-headedness and headaches.   Psychiatric/Behavioral:  Negative for dysphoric mood and sleep disturbance. The patient is not nervous/anxious.        Objective     Visit Vitals  /64   Pulse 65   Temp 36.7 °C (98 °F)   Wt 58.5 kg (129 lb)   SpO2 100%   BMI 19.33 kg/m²   Smoking Status Every Day   BSA 1.68 m²         Physical Exam  Constitutional:       Appearance: Normal appearance. He is underweight.   HENT:      Head: Normocephalic and atraumatic.      Right Ear: Tympanic membrane, ear canal and external ear normal.      Left Ear: Tympanic membrane, ear canal and external ear normal.      Nose: Nose normal.      Mouth/Throat:      Mouth: Mucous membranes are moist.      Pharynx: Oropharynx is clear.   Eyes:      Extraocular Movements: Extraocular movements intact.      Conjunctiva/sclera: Conjunctivae normal.      Pupils: Pupils are equal, round, and  reactive to light.   Cardiovascular:      Rate and Rhythm: Normal rate and regular rhythm.      Pulses: Normal pulses.      Heart sounds: Normal heart sounds.   Pulmonary:      Effort: Pulmonary effort is normal.      Breath sounds: Normal breath sounds.   Chest:      Comments: Pectus excavatum  Abdominal:      General: There is no distension.      Palpations: Abdomen is soft.      Tenderness: There is no abdominal tenderness.   Musculoskeletal:         General: Normal range of motion.   Skin:     General: Skin is warm and dry.   Neurological:      Mental Status: He is alert and oriented to person, place, and time. Mental status is at baseline.   Psychiatric:         Mood and Affect: Mood normal.         Behavior: Behavior normal.         Assessment & Plan  Paraneoplastic neurologic disorder (Multi)    Orders:    NM PET CT FDG wholebody; Future    Serum Protein Electrophoresis; Future    Urinalysis with Reflex Microscopic; Future    PSA; Future    [place]/Lambda Free Light Chain, Serum; Future    CBC and Auto Differential; Future    Comprehensive metabolic panel; Future  Unclear etiology.  I did receive a message yesterday from patient's neurologist stating that she believes he has a paraneoplastic syndrome but is unsure what the primary malignancy might be.  Did order some additional blood testing to help find the etiology.  Failing that, it would potentially be reasonable to do a full body PET scan.    I reviewed quite a bit of documentation and laboratory studies in his chart.  I did not find any results that clearly indicate a specific cause for his issues.  There was a CAT scan with a possible lesion on his sternum.  I am not entirely clear if this would be a neoplastic lesion or healing process from his sternal fracture, or both.  In any case it seems unlikely that he would have a primary tumor of the sternum, but it is reasonable to investigate further.  1 possibility could be a disease process in the marrow  which may not have been identified to date.  As such, SPEP and free light chains are appropriate to test.  Surprisingly given his extensive and ongoing tobacco use, there has been nothing shown in his lungs-if urinalysis is abnormal, it would be worth obtaining cystoscopy with urology for possible bladder cancer given smoking history.  Encounter for prostate cancer screening    Orders:    PSA; Future  Differential could include prostate cancer.  Post laminectomy syndrome    Orders:    amitriptyline (Elavil) 10 mg tablet; Take 1 tablet (10 mg) by mouth once daily at bedtime.  Patient reports he was sent on 100 mg of this rather than 10 mg on most recent prescription.  New refill at 10 mg sent.       Reviewed and approved by BETTYE BARAHONA on 2/28/25 at 2:28 PM.

## 2025-03-01 LAB
ALBUMIN SERPL-MCNC: 3.8 G/DL (ref 3.6–5.1)
ALP SERPL-CCNC: 119 U/L (ref 35–144)
ALT SERPL-CCNC: 16 U/L (ref 9–46)
ANION GAP SERPL CALCULATED.4IONS-SCNC: 6 MMOL/L (CALC) (ref 7–17)
APPEARANCE UR: CLEAR
AST SERPL-CCNC: 18 U/L (ref 10–35)
BACTERIA #/AREA URNS HPF: ABNORMAL /HPF
BASOPHILS # BLD AUTO: 40 CELLS/UL (ref 0–200)
BASOPHILS NFR BLD AUTO: 0.8 %
BILIRUB SERPL-MCNC: 0.4 MG/DL (ref 0.2–1.2)
BILIRUB UR QL STRIP: NEGATIVE
BUN SERPL-MCNC: 11 MG/DL (ref 7–25)
CALCIUM SERPL-MCNC: 9.1 MG/DL (ref 8.6–10.3)
CAOX CRY #/AREA URNS HPF: ABNORMAL /HPF
CHLORIDE SERPL-SCNC: 106 MMOL/L (ref 98–110)
CO2 SERPL-SCNC: 28 MMOL/L (ref 20–32)
COLOR UR: YELLOW
CREAT SERPL-MCNC: 0.75 MG/DL (ref 0.7–1.28)
EGFRCR SERPLBLD CKD-EPI 2021: 97 ML/MIN/1.73M2
EOSINOPHIL # BLD AUTO: 130 CELLS/UL (ref 15–500)
EOSINOPHIL NFR BLD AUTO: 2.6 %
ERYTHROCYTE [DISTWIDTH] IN BLOOD BY AUTOMATED COUNT: 12.2 % (ref 11–15)
GLUCOSE SERPL-MCNC: 110 MG/DL (ref 65–139)
GLUCOSE UR QL STRIP: NEGATIVE
HCT VFR BLD AUTO: 40.4 % (ref 38.5–50)
HGB BLD-MCNC: 13.2 G/DL (ref 13.2–17.1)
HGB UR QL STRIP: NEGATIVE
HYALINE CASTS #/AREA URNS LPF: ABNORMAL /LPF
KETONES UR QL STRIP: ABNORMAL
LEUKOCYTE ESTERASE UR QL STRIP: ABNORMAL
LYMPHOCYTES # BLD AUTO: 1140 CELLS/UL (ref 850–3900)
LYMPHOCYTES NFR BLD AUTO: 22.8 %
MCH RBC QN AUTO: 30.1 PG (ref 27–33)
MCHC RBC AUTO-ENTMCNC: 32.7 G/DL (ref 32–36)
MCV RBC AUTO: 92.2 FL (ref 80–100)
MONOCYTES # BLD AUTO: 370 CELLS/UL (ref 200–950)
MONOCYTES NFR BLD AUTO: 7.4 %
NEUTROPHILS # BLD AUTO: 3320 CELLS/UL (ref 1500–7800)
NEUTROPHILS NFR BLD AUTO: 66.4 %
NITRITE UR QL STRIP: NEGATIVE
PH UR STRIP: 5.5 [PH] (ref 5–8)
PLATELET # BLD AUTO: 261 THOUSAND/UL (ref 140–400)
PMV BLD REES-ECKER: 11.3 FL (ref 7.5–12.5)
POTASSIUM SERPL-SCNC: 4.2 MMOL/L (ref 3.5–5.3)
PROT SERPL-MCNC: 6.6 G/DL (ref 6.1–8.1)
PROT SERPL-MCNC: 6.8 G/DL (ref 6.4–8.2)
PROT UR QL STRIP: ABNORMAL
PSA SERPL-MCNC: 0.17 NG/ML
RBC # BLD AUTO: 4.38 MILLION/UL (ref 4.2–5.8)
RBC #/AREA URNS HPF: ABNORMAL /HPF
SERVICE CMNT-IMP: ABNORMAL
SODIUM SERPL-SCNC: 140 MMOL/L (ref 135–146)
SP GR UR STRIP: 1.02 (ref 1–1.03)
SQUAMOUS #/AREA URNS HPF: ABNORMAL /HPF
WBC # BLD AUTO: 5 THOUSAND/UL (ref 3.8–10.8)
WBC #/AREA URNS HPF: ABNORMAL /HPF

## 2025-03-03 LAB
ALBUMIN: 3.5 G/DL (ref 3.4–5)
ALPHA 1 GLOBULIN: 0.5 G/DL (ref 0.2–0.6)
ALPHA 2 GLOBULIN: 1 G/DL (ref 0.4–1.1)
BETA GLOBULIN: 0.7 G/DL (ref 0.5–1.2)
GAMMA GLOBULIN: 1.1 G/DL (ref 0.5–1.4)
PATH REVIEW-SERUM PROTEIN ELECTROPHORESIS: NORMAL
PROTEIN ELECTROPHORESIS COMMENT: NORMAL

## 2025-03-04 ENCOUNTER — HOME CARE VISIT (OUTPATIENT)
Dept: HOME HEALTH SERVICES | Facility: HOME HEALTH | Age: 70
End: 2025-03-04
Payer: MEDICARE

## 2025-03-04 PROCEDURE — G0152 HHCP-SERV OF OT,EA 15 MIN: HCPCS | Mod: HHH

## 2025-03-04 ASSESSMENT — ENCOUNTER SYMPTOMS
PAIN LOCATION - PAIN QUALITY: ACHING
PAIN: 1
LOWEST PAIN SEVERITY IN PAST 24 HOURS: 2/10
PAIN LOCATION - PAIN FREQUENCY: INTERMITTENT
SUBJECTIVE PAIN PROGRESSION: UNCHANGED
PAIN SEVERITY GOAL: 0/10
HIGHEST PAIN SEVERITY IN PAST 24 HOURS: 2/10
PAIN LOCATION: RIGHT SHOULDER
PERSON REPORTING PAIN: PATIENT
PAIN LOCATION - PAIN SEVERITY: 2/10

## 2025-03-04 ASSESSMENT — ACTIVITIES OF DAILY LIVING (ADL)
BATHING_CURRENT_FUNCTION: INDEPENDENT
AMBULATION ASSISTANCE: INDEPENDENT
AMBULATION ASSISTANCE: 1
CURRENT_FUNCTION: INDEPENDENT
PHYSICAL TRANSFERS ASSESSED: 1
BATHING ASSESSED: 1

## 2025-03-06 ENCOUNTER — HOME CARE VISIT (OUTPATIENT)
Dept: HOME HEALTH SERVICES | Facility: HOME HEALTH | Age: 70
End: 2025-03-06
Payer: MEDICARE

## 2025-03-06 DIAGNOSIS — G98.8 PARANEOPLASTIC NEUROLOGIC DISORDER (MULTI): Primary | ICD-10-CM

## 2025-03-06 DIAGNOSIS — C80.1 PARANEOPLASTIC NEUROLOGIC DISORDER (MULTI): Primary | ICD-10-CM

## 2025-03-06 PROCEDURE — G0152 HHCP-SERV OF OT,EA 15 MIN: HCPCS | Mod: HHH

## 2025-03-06 ASSESSMENT — ENCOUNTER SYMPTOMS
PAIN: 1
PERSON REPORTING PAIN: PATIENT
LOWEST PAIN SEVERITY IN PAST 24 HOURS: 2/10
PAIN LOCATION: RIGHT SHOULDER
PAIN LOCATION - PAIN SEVERITY: 2/10
PAIN LOCATION - PAIN QUALITY: ACHING
HIGHEST PAIN SEVERITY IN PAST 24 HOURS: 2/10
SUBJECTIVE PAIN PROGRESSION: UNCHANGED
PAIN LOCATION - PAIN FREQUENCY: INTERMITTENT
PAIN SEVERITY GOAL: 0/10

## 2025-03-06 ASSESSMENT — ACTIVITIES OF DAILY LIVING (ADL)
DRESSING_LB_CURRENT_FUNCTION: INDEPENDENT
DRESSING_UB_CURRENT_FUNCTION: INDEPENDENT
PHYSICAL TRANSFERS ASSESSED: 1
CURRENT_FUNCTION: INDEPENDENT

## 2025-03-07 LAB
KAPPA LC SERPL-MCNC: 2.38 MG/DL (ref 0.33–1.94)
KAPPA LC/LAMBDA SER: 1.36 {RATIO} (ref 0.26–1.65)
LAMBDA LC SERPL-MCNC: 1.75 MG/DL (ref 0.57–2.63)

## 2025-03-10 ENCOUNTER — TELEPHONE (OUTPATIENT)
Dept: PRIMARY CARE | Facility: CLINIC | Age: 70
End: 2025-03-10
Payer: MEDICARE

## 2025-03-10 ENCOUNTER — HOME CARE VISIT (OUTPATIENT)
Dept: HOME HEALTH SERVICES | Facility: HOME HEALTH | Age: 70
End: 2025-03-10
Payer: MEDICARE

## 2025-03-10 DIAGNOSIS — G62.9 NEUROPATHY: ICD-10-CM

## 2025-03-10 PROCEDURE — G0152 HHCP-SERV OF OT,EA 15 MIN: HCPCS | Mod: HHH

## 2025-03-10 RX ORDER — GABAPENTIN 400 MG/1
400 CAPSULE ORAL 4 TIMES DAILY
Qty: 360 CAPSULE | Refills: 0 | Status: SHIPPED | OUTPATIENT
Start: 2025-03-10

## 2025-03-10 ASSESSMENT — ENCOUNTER SYMPTOMS
PAIN SEVERITY GOAL: 0/10
PAIN LOCATION - PAIN FREQUENCY: INTERMITTENT
PAIN LOCATION - PAIN QUALITY: ACHING
PAIN: 1
LOWEST PAIN SEVERITY IN PAST 24 HOURS: 4/10
HIGHEST PAIN SEVERITY IN PAST 24 HOURS: 4/10
PAIN LOCATION - PAIN SEVERITY: 4/10
SUBJECTIVE PAIN PROGRESSION: UNCHANGED
PAIN LOCATION: RIGHT SHOULDER
PERSON REPORTING PAIN: PATIENT

## 2025-03-12 ENCOUNTER — HOME CARE VISIT (OUTPATIENT)
Dept: HOME HEALTH SERVICES | Facility: HOME HEALTH | Age: 70
End: 2025-03-12
Payer: MEDICARE

## 2025-03-12 PROCEDURE — G0152 HHCP-SERV OF OT,EA 15 MIN: HCPCS | Mod: HHH

## 2025-03-12 ASSESSMENT — ENCOUNTER SYMPTOMS
SUBJECTIVE PAIN PROGRESSION: UNCHANGED
LOWEST PAIN SEVERITY IN PAST 24 HOURS: 0/10
PAIN SEVERITY GOAL: 0/10
PERSON REPORTING PAIN: PATIENT
HIGHEST PAIN SEVERITY IN PAST 24 HOURS: 0/10
DENIES PAIN: 1

## 2025-03-12 ASSESSMENT — ACTIVITIES OF DAILY LIVING (ADL)
HOME_HEALTH_OASIS: 00
OASIS_M1830: 00

## 2025-03-14 ENCOUNTER — HOSPITAL ENCOUNTER (OUTPATIENT)
Dept: RADIOLOGY | Facility: CLINIC | Age: 70
Discharge: HOME | End: 2025-03-14
Payer: MEDICARE

## 2025-03-14 DIAGNOSIS — C80.1 PARANEOPLASTIC NEUROLOGIC DISORDER (MULTI): ICD-10-CM

## 2025-03-14 DIAGNOSIS — G98.8 PARANEOPLASTIC NEUROLOGIC DISORDER (MULTI): ICD-10-CM

## 2025-03-14 PROCEDURE — 3430000001 HC RX 343 DIAGNOSTIC RADIOPHARMACEUTICALS: Performed by: STUDENT IN AN ORGANIZED HEALTH CARE EDUCATION/TRAINING PROGRAM

## 2025-03-14 PROCEDURE — 78816 PET IMAGE W/CT FULL BODY: CPT

## 2025-03-14 PROCEDURE — A9552 F18 FDG: HCPCS | Performed by: STUDENT IN AN ORGANIZED HEALTH CARE EDUCATION/TRAINING PROGRAM

## 2025-03-14 RX ORDER — FLUDEOXYGLUCOSE F 18 200 MCI/ML
12 INJECTION, SOLUTION INTRAVENOUS
Status: COMPLETED | OUTPATIENT
Start: 2025-03-14 | End: 2025-03-14

## 2025-03-14 RX ADMIN — FLUDEOXYGLUCOSE F 18 12 MILLICURIE: 200 INJECTION, SOLUTION INTRAVENOUS at 07:30

## 2025-03-15 DIAGNOSIS — M19.019 SHOULDER ARTHRITIS: ICD-10-CM

## 2025-03-17 RX ORDER — ESCITALOPRAM OXALATE 5 MG/1
5 TABLET ORAL DAILY
Qty: 100 TABLET | Refills: 1 | OUTPATIENT
Start: 2025-03-17

## 2025-03-18 ENCOUNTER — TELEPHONE (OUTPATIENT)
Dept: PRIMARY CARE | Facility: CLINIC | Age: 70
End: 2025-03-18
Payer: MEDICARE

## 2025-03-18 NOTE — TELEPHONE ENCOUNTER
Please let patient know that PET scan did not show any definitive lesions.    Urine test showed some protein in the urine.  Blood test showed an abnormal ratio of antibody types.  This combination is concerning for possible multiple myeloma (although not always), and I would request that he schedule with an oncologist to discuss next steps like we talked about last time I saw him.

## 2025-03-27 ENCOUNTER — TELEPHONE (OUTPATIENT)
Dept: HEMATOLOGY/ONCOLOGY | Facility: CLINIC | Age: 70
End: 2025-03-27
Payer: MEDICARE

## 2025-04-02 ENCOUNTER — DOCUMENTATION (OUTPATIENT)
Dept: HEMATOLOGY/ONCOLOGY | Facility: CLINIC | Age: 70
End: 2025-04-02
Payer: MEDICARE

## 2025-04-02 ENCOUNTER — APPOINTMENT (OUTPATIENT)
Dept: HEMATOLOGY/ONCOLOGY | Facility: CLINIC | Age: 70
End: 2025-04-02
Payer: MEDICARE

## 2025-04-02 NOTE — PROGRESS NOTES
Patient presented to clinic today for OFV with Dr. Rowley.  Per chart and Dr. Rowley she reviewed his labs and PET and everything is normal patient does not need seen for visit.  Visit had been cancelled.  Patient notified that per Dr. Rowley his labs and PET are normal and not concerning.  He did not need to be seen and needs to follow up with his PCP.  Apologized to patient for not being notified prior to arrival.  Patient expressed frustration.  This RN listened and offered information for patient advocate.

## 2025-04-19 DIAGNOSIS — M19.019 SHOULDER ARTHRITIS: ICD-10-CM

## 2025-04-20 DIAGNOSIS — N40.1 BENIGN PROSTATIC HYPERPLASIA WITH URINARY FREQUENCY: ICD-10-CM

## 2025-04-20 DIAGNOSIS — R35.0 BENIGN PROSTATIC HYPERPLASIA WITH URINARY FREQUENCY: ICD-10-CM

## 2025-04-21 RX ORDER — AMANTADINE HYDROCHLORIDE 100 MG/1
100 CAPSULE, GELATIN COATED ORAL 2 TIMES DAILY
Qty: 60 CAPSULE | Refills: 5 | Status: SHIPPED | OUTPATIENT
Start: 2025-04-21

## 2025-04-22 DIAGNOSIS — G47.9 SLEEP DISTURBANCES: ICD-10-CM

## 2025-04-22 RX ORDER — TRAZODONE HYDROCHLORIDE 150 MG/1
150 TABLET ORAL NIGHTLY PRN
Qty: 100 TABLET | Refills: 1 | OUTPATIENT
Start: 2025-04-22

## 2025-04-22 RX ORDER — TAMSULOSIN HYDROCHLORIDE 0.4 MG/1
0.4 CAPSULE ORAL DAILY
Qty: 90 CAPSULE | Refills: 1 | Status: SHIPPED | OUTPATIENT
Start: 2025-04-22

## 2025-05-03 DIAGNOSIS — R35.0 BENIGN PROSTATIC HYPERPLASIA WITH URINARY FREQUENCY: ICD-10-CM

## 2025-05-03 DIAGNOSIS — N40.1 BENIGN PROSTATIC HYPERPLASIA WITH URINARY FREQUENCY: ICD-10-CM

## 2025-05-06 ENCOUNTER — TELEPHONE (OUTPATIENT)
Dept: PRIMARY CARE | Facility: CLINIC | Age: 70
End: 2025-05-06
Payer: MEDICARE

## 2025-05-06 DIAGNOSIS — G62.9 NEUROPATHY: ICD-10-CM

## 2025-05-06 RX ORDER — GABAPENTIN 400 MG/1
800 CAPSULE ORAL 4 TIMES DAILY
Qty: 240 CAPSULE | Refills: 0 | Status: SHIPPED | OUTPATIENT
Start: 2025-05-06

## 2025-05-06 RX ORDER — GABAPENTIN 400 MG/1
800 CAPSULE ORAL 4 TIMES DAILY
Qty: 720 CAPSULE | Refills: 0 | Status: SHIPPED | OUTPATIENT
Start: 2025-05-06

## 2025-05-06 RX ORDER — SOLIFENACIN SUCCINATE 5 MG/1
TABLET, FILM COATED ORAL
Qty: 90 TABLET | Refills: 1 | Status: SHIPPED | OUTPATIENT
Start: 2025-05-06

## 2025-05-06 NOTE — TELEPHONE ENCOUNTER
Patient stated that Prescription sent in on 03-10-25 for Gabapentin 400 MG was called in wrong, it should have said take 2 capsules by mouth 4 times a day instead of one capsule. Patient stated he will run out of medication    Pharmacy is [facility] 90 day supply    Please send a 30 day supply to [facility]    Patient can be reached at [phone]

## 2025-05-18 DIAGNOSIS — E78.2 MIXED HYPERLIPIDEMIA: ICD-10-CM

## 2025-05-19 RX ORDER — ATORVASTATIN CALCIUM 20 MG/1
20 TABLET, FILM COATED ORAL DAILY
Qty: 90 TABLET | Refills: 1 | Status: SHIPPED | OUTPATIENT
Start: 2025-05-19

## 2025-06-18 ENCOUNTER — TELEPHONE (OUTPATIENT)
Dept: UROLOGY | Facility: CLINIC | Age: 70
End: 2025-06-18

## 2025-06-18 ENCOUNTER — APPOINTMENT (OUTPATIENT)
Dept: UROLOGY | Facility: CLINIC | Age: 70
End: 2025-06-18
Payer: MEDICARE

## 2025-06-18 NOTE — TELEPHONE ENCOUNTER
Attempted to contact patient to reschedule missed 6 month FUV with Dr. Rodriguez. Left office phone number and business hours. Asked patient to call us back to reschedule.

## 2025-07-02 ENCOUNTER — TELEPHONE (OUTPATIENT)
Dept: PRIMARY CARE | Facility: CLINIC | Age: 70
End: 2025-07-02
Payer: MEDICARE

## 2025-07-02 DIAGNOSIS — M96.1 POST LAMINECTOMY SYNDROME: ICD-10-CM

## 2025-07-02 DIAGNOSIS — G62.9 NEUROPATHY: ICD-10-CM

## 2025-07-02 DIAGNOSIS — M19.019 SHOULDER ARTHRITIS: ICD-10-CM

## 2025-07-04 RX ORDER — GABAPENTIN 400 MG/1
800 CAPSULE ORAL 4 TIMES DAILY
Qty: 720 CAPSULE | Refills: 0 | Status: SHIPPED | OUTPATIENT
Start: 2025-07-04

## 2025-07-04 RX ORDER — AMITRIPTYLINE HYDROCHLORIDE 10 MG/1
10 TABLET, FILM COATED ORAL NIGHTLY
Qty: 100 TABLET | Refills: 0 | Status: SHIPPED | OUTPATIENT
Start: 2025-07-04

## 2025-07-04 RX ORDER — AMANTADINE HYDROCHLORIDE 100 MG/1
100 CAPSULE, GELATIN COATED ORAL 2 TIMES DAILY
Qty: 180 CAPSULE | Refills: 0 | Status: SHIPPED | OUTPATIENT
Start: 2025-07-04

## 2025-07-08 ENCOUNTER — TELEPHONE (OUTPATIENT)
Dept: PRIMARY CARE | Facility: CLINIC | Age: 70
End: 2025-07-08
Payer: MEDICARE

## 2025-07-08 DIAGNOSIS — E55.9 VITAMIN D DEFICIENCY: ICD-10-CM

## 2025-07-08 DIAGNOSIS — E78.2 MIXED HYPERLIPIDEMIA: ICD-10-CM

## 2025-07-08 DIAGNOSIS — E03.9 HYPOTHYROIDISM, UNSPECIFIED TYPE: ICD-10-CM

## 2025-07-23 ENCOUNTER — TELEPHONE (OUTPATIENT)
Dept: PRIMARY CARE | Facility: CLINIC | Age: 70
End: 2025-07-23
Payer: MEDICARE

## 2025-07-23 DIAGNOSIS — G47.9 SLEEP DISTURBANCES: ICD-10-CM

## 2025-07-23 RX ORDER — TRAZODONE HYDROCHLORIDE 150 MG/1
150 TABLET ORAL NIGHTLY PRN
Qty: 100 TABLET | Refills: 0 | Status: SHIPPED | OUTPATIENT
Start: 2025-07-23

## 2025-07-23 NOTE — TELEPHONE ENCOUNTER
Patient presented to office to request a refill of Trazodone 150 mg be forwarded to [facility] pharmacy.  Patient was last seen 2/28/2025 and has a future physical on 9/9/2025.  Please advise.    Patient ph# [phone]

## 2025-07-24 ENCOUNTER — APPOINTMENT (OUTPATIENT)
Dept: UROLOGY | Facility: CLINIC | Age: 70
End: 2025-07-24
Payer: MEDICARE

## 2025-07-24 DIAGNOSIS — N40.1 BENIGN PROSTATIC HYPERPLASIA WITH URINARY FREQUENCY: Primary | ICD-10-CM

## 2025-07-24 DIAGNOSIS — R35.0 BENIGN PROSTATIC HYPERPLASIA WITH URINARY FREQUENCY: Primary | ICD-10-CM

## 2025-07-24 PROCEDURE — 1125F AMNT PAIN NOTED PAIN PRSNT: CPT | Performed by: SURGERY

## 2025-07-24 PROCEDURE — 1159F MED LIST DOCD IN RCRD: CPT | Performed by: SURGERY

## 2025-07-24 PROCEDURE — 99213 OFFICE O/P EST LOW 20 MIN: CPT | Performed by: SURGERY

## 2025-07-24 PROCEDURE — 1160F RVW MEDS BY RX/DR IN RCRD: CPT | Performed by: SURGERY

## 2025-07-24 PROCEDURE — G2211 COMPLEX E/M VISIT ADD ON: HCPCS | Performed by: SURGERY

## 2025-07-24 RX ORDER — TAMSULOSIN HYDROCHLORIDE 0.4 MG/1
0.8 CAPSULE ORAL EVERY EVENING
Qty: 180 CAPSULE | Refills: 3 | Status: SHIPPED | OUTPATIENT
Start: 2025-07-24

## 2025-07-24 RX ORDER — SOLIFENACIN SUCCINATE 5 MG/1
5 TABLET, FILM COATED ORAL DAILY
Qty: 90 TABLET | Refills: 3 | Status: SHIPPED | OUTPATIENT
Start: 2025-07-24 | End: 2026-07-19

## 2025-07-24 RX ORDER — SOLIFENACIN SUCCINATE 5 MG/1
5 TABLET, FILM COATED ORAL DAILY
Qty: 30 TABLET | Refills: 0 | Status: SHIPPED | OUTPATIENT
Start: 2025-07-24 | End: 2025-07-24 | Stop reason: SDUPTHER

## 2025-07-24 RX ORDER — TAMSULOSIN HYDROCHLORIDE 0.4 MG/1
0.8 CAPSULE ORAL EVERY EVENING
Qty: 60 CAPSULE | Refills: 0 | Status: SHIPPED | OUTPATIENT
Start: 2025-07-24 | End: 2025-07-24 | Stop reason: SDUPTHER

## 2025-07-24 ASSESSMENT — PAIN SCALES - GENERAL: PAINLEVEL_OUTOF10: 4

## 2025-07-24 NOTE — PROGRESS NOTES
Subjective   Patient ID: Davi Sol is a 69 y.o. male who presents for Follow-up and Difficulty Urinating.    HPI  He has a known history of BPH with LUTS.  He is currently on tamsulosin and Vesicare.  His symptoms are about the same.  He does have episodes of urgency.  Sometimes he does have to wait to completely empty.  He denies any hematuria.  His last PSA was normal.  He also has a history of erectile dysfunction.  He had a previous penile prosthesis.  He has not used it in some time.              Objective   Physical Exam  Well nourished, thin  Breathing comfortably  Abdomen soft, ND, NT                  Assessment/Plan   Problem List Items Addressed This Visit    None  Visit Diagnoses         Codes      Benign prostatic hyperplasia with urinary frequency    -  Primary N40.1, R35.0    Relevant Medications    tamsulosin (Flomax) 0.4 mg 24 hr capsule    solifenacin (VESIcare) 5 mg tablet    Other Relevant Orders    Follow Up In Urology             Clinically he is doing well.  He continues to have mild to moderate symptoms.  I will refill his Flomax and Vesicare today.  He will return and see me in 1 year.            Jc Rodriguez MD 07/24/25 10:22 AM

## 2025-08-06 ENCOUNTER — TELEPHONE (OUTPATIENT)
Dept: PRIMARY CARE | Facility: CLINIC | Age: 70
End: 2025-08-06
Payer: MEDICARE

## 2025-08-06 DIAGNOSIS — E78.2 MIXED HYPERLIPIDEMIA: ICD-10-CM

## 2025-08-06 DIAGNOSIS — E55.9 VITAMIN D DEFICIENCY: ICD-10-CM

## 2025-08-06 DIAGNOSIS — E03.9 HYPOTHYROIDISM, UNSPECIFIED TYPE: ICD-10-CM

## 2025-08-26 DIAGNOSIS — E03.9 HYPOTHYROIDISM, UNSPECIFIED TYPE: ICD-10-CM

## 2025-08-26 DIAGNOSIS — E78.2 MIXED HYPERLIPIDEMIA: ICD-10-CM

## 2025-08-26 DIAGNOSIS — E55.9 VITAMIN D DEFICIENCY: ICD-10-CM

## 2025-08-27 ENCOUNTER — TELEPHONE (OUTPATIENT)
Dept: PRIMARY CARE | Facility: CLINIC | Age: 70
End: 2025-08-27
Payer: MEDICARE

## 2025-08-27 DIAGNOSIS — M19.019 SHOULDER ARTHRITIS: ICD-10-CM

## 2025-08-27 DIAGNOSIS — F41.9 ANXIETY: Primary | ICD-10-CM

## 2025-08-27 RX ORDER — ESCITALOPRAM OXALATE 5 MG/1
5 TABLET ORAL DAILY
Qty: 90 TABLET | Refills: 1 | Status: SHIPPED | OUTPATIENT
Start: 2025-08-27 | End: 2025-11-25

## 2025-08-27 RX ORDER — ESCITALOPRAM OXALATE 5 MG/1
5 TABLET ORAL DAILY
Qty: 30 TABLET | Refills: 0 | Status: SHIPPED | OUTPATIENT
Start: 2025-08-27

## 2025-09-09 ENCOUNTER — APPOINTMENT (OUTPATIENT)
Dept: PRIMARY CARE | Facility: CLINIC | Age: 70
End: 2025-09-09
Payer: MEDICARE

## 2025-09-16 ENCOUNTER — APPOINTMENT (OUTPATIENT)
Dept: PRIMARY CARE | Facility: CLINIC | Age: 70
End: 2025-09-16
Payer: MEDICARE

## 2026-07-28 ENCOUNTER — APPOINTMENT (OUTPATIENT)
Dept: UROLOGY | Facility: CLINIC | Age: 71
End: 2026-07-28
Payer: MEDICARE

## (undated) DEVICE — SPONGE, LAP, XRAY DECT, SC+RFID, 18X18, NO LOOP, STERILE

## (undated) DEVICE — REAMER, ANGLED, SMALL

## (undated) DEVICE — Device

## (undated) DEVICE — TUBING, IRRIGATION, HIGH FLOW, HAND PIECE SET

## (undated) DEVICE — GLOVE, SURGICAL, PROTEXIS PI BLUE W/NEUTHERA, 7.5, PF, LF

## (undated) DEVICE — CABLE, BIPOLAR, REUSABLE, DAVINCI XI

## (undated) DEVICE — HUMERAL INSERT S/36 +3 TO FIT IN 36 CUP
Type: IMPLANTABLE DEVICE | Site: SHOULDER | Status: NON-FUNCTIONAL
Brand: ARTHREX®

## (undated) DEVICE — SUTURE, MONOCRYL, 4-0, 27 IN, PS-2, UNDYED

## (undated) DEVICE — SUTURE, TAPE, 36 IN X 1.3 IN, TAPERED END/ NEEDLE

## (undated) DEVICE — ELECTRODE, ELECTROSURGICAL, PENCIL, HAND CONTROL, BLADE, W/SMOKE EVACUATION, W/HOLSTER, 10 FT CORD

## (undated) DEVICE — INTERPULSE HANDPIECE SET W/ 10FT SUCTION TUBING

## (undated) DEVICE — PACK, BEACH CHAIR SHOULDER

## (undated) DEVICE — SOLUTION, IRRIGATION, X RX SODIUM CHL 0.9%, 1000ML BTL

## (undated) DEVICE — SUTURE, VICRYL, 2-0, 36 IN, CT-1, UNDYED

## (undated) DEVICE — WOUND SYSTEM, DEBRIDEMENT & CLEANING, O.R DUOPAK

## (undated) DEVICE — DRESSING, SILVERLON FLEXIBLE ISLAND, 4 X 11IN

## (undated) DEVICE — STOCKINETTE, IMPERVIOUS, 12 X 48 IN, LF, STERILE

## (undated) DEVICE — COVER, TIP HOT SHEARS ENDOWRIST

## (undated) DEVICE — STRIP, SKIN CLOSURE, STERI STRIP, REINFORCED, 0.5 X 4 IN

## (undated) DEVICE — IMMOBILIZER, ULTRASLING III, MEDIUM

## (undated) DEVICE — GOWN, SURGICAL, SIRUS, NON REINFORCED, LARGE

## (undated) DEVICE — TUBING, INSUFFLATION, W/ .3 MICRO FILTER & ADAPTER

## (undated) DEVICE — CARE KIT, LAPAROSCOPIC, ADVANCED

## (undated) DEVICE — ADHESIVE, SKIN, DERMABOND ADVANCED, 15CM, PEN-STYLE

## (undated) DEVICE — SUTURE, VICRYL, 3-0, 27 IN, CT-1, UNDYED

## (undated) DEVICE — BLADE, SAW, SAGITTAL, 25 X 73 X 1.24MM, SS, STERILE

## (undated) DEVICE — SLEEVE, VASO PRESS, CALF GARMENT, MEDIUM, GREEN

## (undated) DEVICE — SUTURE, VICRYL, 0, 36 IN, CT-1, UNDYED

## (undated) DEVICE — 4.5X28MM PERIPHERAL SCREW, NON-LOCKING
Type: IMPLANTABLE DEVICE | Site: SHOULDER | Status: NON-FUNCTIONAL
Brand: ARTHREX®

## (undated) DEVICE — OBTURATOR, BLADELESS , SU

## (undated) DEVICE — DRAPE, ARM XI

## (undated) DEVICE — POSITIONING SYSTEM, PAGAZZI, PATIENT

## (undated) DEVICE — CAUTERY, PENCIL, PUSH BUTTON, SMOKE EVAC, 70MM

## (undated) DEVICE — BANDAGE, COBAN 2, LAYER LITE COMPRESSION, 4 IN, LF

## (undated) DEVICE — CABLE, MONOPOLAR, REUSABLE, DAVINCI XI

## (undated) DEVICE — HUM INSERT S/36+6 TO FIT IN 36 CUP CONST
Type: IMPLANTABLE DEVICE | Site: SHOULDER | Status: NON-FUNCTIONAL
Brand: ARTHREX®

## (undated) DEVICE — DRAPE, SHEET, U, W/ADHESIVE STRIP, IMPERVIOUS, 60 X 70 IN, DISPOSABLE, LF, STERILE

## (undated) DEVICE — DRAPE, SHEET, LARGE, 70 X 85IN, STERILE

## (undated) DEVICE — DRAPE, C-ARM IMAGE

## (undated) DEVICE — GLOVE, SURGICAL, PROTEXIS PI , 7.0, PF, LF

## (undated) DEVICE — RETRIEVER, SUTURE, HEWSON

## (undated) DEVICE — SUTURE, VICRYL, 2-0, 27 IN, BR/SH 27, VIOLET

## (undated) DEVICE — 7 INCHES X 11 INCHES (18CM X 30CM) STERI-DRAPE INSTRUMENT POUCH, CLEAR PLASTIC, 2 COMPARTMENTS, 2 ADHESIVE STRIPS

## (undated) DEVICE — NEEDLE, SCORPION MULTIFIRE

## (undated) DEVICE — SKIN CLOSURE SYS, PREMIERPRO EXOFIN, 1-4CM X 22CM, 1.75G TUBE

## (undated) DEVICE — DRAPE, INCISE, ANTIMICROBIAL, IOBAN 2, 13 X 13 IN, DISPOSABLE, STERILE

## (undated) DEVICE — SUTURE TAPE, 1.3MM 40IN, BLK/WH, W/TAPER NDL 36.6MM, DARK BLUE, NONABSORB

## (undated) DEVICE — SUTURE, V-LOC, 2-0, 6IN, GS-22, GREEN

## (undated) DEVICE — DRILL BIT, 3.0MM GLENOID, V-SHAPE FLUTE, STERILE

## (undated) DEVICE — SUTURE, FIBERWIRE 2, T-5 TAPER NEEDLE, 38"

## (undated) DEVICE — IMMOBILIZER, ULTRASLING III, LARGE

## (undated) DEVICE — GLOVE, SURGICAL, PROTEXIS PI , 7.5, PF, LF

## (undated) DEVICE — 36/24 GLENOSPHERE
Type: IMPLANTABLE DEVICE | Site: SHOULDER | Status: NON-FUNCTIONAL
Brand: ARTHREX®

## (undated) DEVICE — SUTURE, SILK, 2-0, 30 IN, SH, BLACK

## (undated) DEVICE — BLADE, SAW, SAGITTAL, 21 X 84.5 X 0.89 MM, STAINLESS STEEL, STERILE

## (undated) DEVICE — SEAL, UNIVERSAL 5-8MM  XI